# Patient Record
Sex: FEMALE | Race: OTHER | HISPANIC OR LATINO
[De-identification: names, ages, dates, MRNs, and addresses within clinical notes are randomized per-mention and may not be internally consistent; named-entity substitution may affect disease eponyms.]

---

## 2017-01-03 ENCOUNTER — APPOINTMENT (OUTPATIENT)
Dept: ELECTROPHYSIOLOGY | Facility: CLINIC | Age: 57
End: 2017-01-03

## 2017-01-29 ENCOUNTER — RESULT REVIEW (OUTPATIENT)
Age: 57
End: 2017-01-29

## 2017-02-03 ENCOUNTER — APPOINTMENT (OUTPATIENT)
Dept: ELECTROPHYSIOLOGY | Facility: CLINIC | Age: 57
End: 2017-02-03

## 2017-02-27 NOTE — ASU PATIENT PROFILE, ADULT - PMH
Dvt femoral (deep venous thrombosis)  left leg in 2014  Endogenous hyperlipemia    Hypertension    Type 2 diabetes mellitus

## 2017-02-27 NOTE — ASU PATIENT PROFILE, ADULT - PSH
[place] filter in place  left  H/O tubal ligation    Proliferative diabetic retinopathy of left eye  s/p PPV/laser/silicone  5/31/16  Shoulder disorder  left [place] filter in place  left  H/O tubal ligation    History of loop recorder    Proliferative diabetic retinopathy of left eye  s/p PPV/laser/silicone  5/31/16 and 11/29/16 OS  Shoulder disorder  left

## 2017-02-28 ENCOUNTER — OUTPATIENT (OUTPATIENT)
Dept: OUTPATIENT SERVICES | Facility: HOSPITAL | Age: 57
LOS: 1 days | Discharge: HOME | End: 2017-02-28
Payer: MEDICAID

## 2017-02-28 ENCOUNTER — TRANSCRIPTION ENCOUNTER (OUTPATIENT)
Age: 57
End: 2017-02-28

## 2017-02-28 VITALS
HEART RATE: 75 BPM | TEMPERATURE: 98 F | HEIGHT: 60 IN | SYSTOLIC BLOOD PRESSURE: 132 MMHG | WEIGHT: 141.1 LBS | OXYGEN SATURATION: 100 % | RESPIRATION RATE: 17 BRPM | DIASTOLIC BLOOD PRESSURE: 78 MMHG

## 2017-02-28 VITALS
SYSTOLIC BLOOD PRESSURE: 106 MMHG | DIASTOLIC BLOOD PRESSURE: 64 MMHG | RESPIRATION RATE: 17 BRPM | OXYGEN SATURATION: 97 % | HEART RATE: 86 BPM

## 2017-02-28 DIAGNOSIS — Z95.828 PRESENCE OF OTHER VASCULAR IMPLANTS AND GRAFTS: Chronic | ICD-10-CM

## 2017-02-28 DIAGNOSIS — Z98.51 TUBAL LIGATION STATUS: Chronic | ICD-10-CM

## 2017-02-28 DIAGNOSIS — M25.9 JOINT DISORDER, UNSPECIFIED: Chronic | ICD-10-CM

## 2017-02-28 DIAGNOSIS — E11.3519 TYPE 2 DIABETES MELLITUS WITH PROLIFERATIVE DIABETIC RETINOPATHY WITH MACULAR EDEMA, UNSPECIFIED EYE: ICD-10-CM

## 2017-02-28 DIAGNOSIS — Z98.890 OTHER SPECIFIED POSTPROCEDURAL STATES: Chronic | ICD-10-CM

## 2017-02-28 DIAGNOSIS — E11.3592 TYPE 2 DIABETES MELLITUS WITH PROLIFERATIVE DIABETIC RETINOPATHY WITHOUT MACULAR EDEMA, LEFT EYE: Chronic | ICD-10-CM

## 2017-02-28 LAB — HCG UR QL: NEGATIVE — SIGNIFICANT CHANGE UP

## 2017-02-28 PROCEDURE — 81025 URINE PREGNANCY TEST: CPT

## 2017-02-28 PROCEDURE — 67113 REPAIR RETINAL DETACH CPLX: CPT | Mod: RT

## 2017-02-28 PROCEDURE — C1814: CPT

## 2017-02-28 PROCEDURE — C1889: CPT

## 2017-02-28 NOTE — ASU DISCHARGE PLAN (ADULT/PEDIATRIC). - PROCEDURE
right eye retina surgery right eye pars plana vitrectomy, lensectomy, membrane peel, endolaser, silicone oil injection

## 2017-02-28 NOTE — ASU DISCHARGE PLAN (ADULT/PEDIATRIC). - NOTIFY
Bleeding that does not stop/Persistent Nausea and Vomiting/Pain not relieved by Medications/Swelling that continues/Fever greater than 101

## 2017-03-06 ENCOUNTER — APPOINTMENT (OUTPATIENT)
Dept: ELECTROPHYSIOLOGY | Facility: CLINIC | Age: 57
End: 2017-03-06

## 2017-04-07 ENCOUNTER — APPOINTMENT (OUTPATIENT)
Dept: ELECTROPHYSIOLOGY | Facility: CLINIC | Age: 57
End: 2017-04-07

## 2017-05-08 ENCOUNTER — APPOINTMENT (OUTPATIENT)
Dept: ELECTROPHYSIOLOGY | Facility: CLINIC | Age: 57
End: 2017-05-08

## 2017-06-09 ENCOUNTER — APPOINTMENT (OUTPATIENT)
Dept: ELECTROPHYSIOLOGY | Facility: CLINIC | Age: 57
End: 2017-06-09

## 2017-07-10 ENCOUNTER — APPOINTMENT (OUTPATIENT)
Dept: ELECTROPHYSIOLOGY | Facility: CLINIC | Age: 57
End: 2017-07-10

## 2017-08-08 ENCOUNTER — TRANSCRIPTION ENCOUNTER (OUTPATIENT)
Age: 57
End: 2017-08-08

## 2017-08-08 ENCOUNTER — OUTPATIENT (OUTPATIENT)
Dept: OUTPATIENT SERVICES | Facility: HOSPITAL | Age: 57
LOS: 1 days | End: 2017-08-08
Payer: MEDICAID

## 2017-08-08 VITALS
OXYGEN SATURATION: 95 % | DIASTOLIC BLOOD PRESSURE: 77 MMHG | SYSTOLIC BLOOD PRESSURE: 120 MMHG | HEART RATE: 82 BPM | RESPIRATION RATE: 17 BRPM

## 2017-08-08 VITALS
DIASTOLIC BLOOD PRESSURE: 72 MMHG | OXYGEN SATURATION: 97 % | RESPIRATION RATE: 15 BRPM | SYSTOLIC BLOOD PRESSURE: 145 MMHG | WEIGHT: 150.36 LBS | HEART RATE: 76 BPM | TEMPERATURE: 98 F

## 2017-08-08 DIAGNOSIS — Z95.828 PRESENCE OF OTHER VASCULAR IMPLANTS AND GRAFTS: Chronic | ICD-10-CM

## 2017-08-08 DIAGNOSIS — M25.9 JOINT DISORDER, UNSPECIFIED: Chronic | ICD-10-CM

## 2017-08-08 DIAGNOSIS — Z98.51 TUBAL LIGATION STATUS: Chronic | ICD-10-CM

## 2017-08-08 DIAGNOSIS — Z98.890 OTHER SPECIFIED POSTPROCEDURAL STATES: Chronic | ICD-10-CM

## 2017-08-08 DIAGNOSIS — E11.3519 TYPE 2 DIABETES MELLITUS WITH PROLIFERATIVE DIABETIC RETINOPATHY WITH MACULAR EDEMA, UNSPECIFIED EYE: ICD-10-CM

## 2017-08-08 DIAGNOSIS — E11.3592 TYPE 2 DIABETES MELLITUS WITH PROLIFERATIVE DIABETIC RETINOPATHY WITHOUT MACULAR EDEMA, LEFT EYE: Chronic | ICD-10-CM

## 2017-08-08 PROCEDURE — C1814: CPT

## 2017-08-08 PROCEDURE — C1889: CPT

## 2017-08-08 PROCEDURE — 67039 LASER TREATMENT OF RETINA: CPT | Mod: RT

## 2017-08-08 NOTE — ASU PATIENT PROFILE, ADULT - PMH
Dvt femoral (deep venous thrombosis)  left leg in 2014  Endogenous hyperlipemia    Hypertension    Type 2 diabetes mellitus Constipation    DJD (degenerative joint disease)    Dvt femoral (deep venous thrombosis)  left leg in 2014  Endogenous hyperlipemia    Hypertension    Syncope    Type 2 diabetes mellitus    Vitamin D deficiency

## 2017-08-08 NOTE — ASU DISCHARGE PLAN (ADULT/PEDIATRIC). - NOTIFY
Pain not relieved by Medications/Bleeding that does not stop/Inability to Tolerate Liquids or Foods/Fever greater than 101/Persistent Nausea and Vomiting

## 2017-08-08 NOTE — ASU DISCHARGE PLAN (ADULT/PEDIATRIC). - PT EDUC
car gas bubble card  Eye shield with instructions , sunglasses and eye kit given to patient./other (specify)

## 2017-08-08 NOTE — ASU PATIENT PROFILE, ADULT - PSH
[place] filter in place  left  H/O tubal ligation    History of loop recorder    Proliferative diabetic retinopathy of left eye  s/p PPV/laser/silicone  5/31/16 and 11/29/16 OS  Shoulder disorder  left

## 2017-08-11 ENCOUNTER — APPOINTMENT (OUTPATIENT)
Dept: ELECTROPHYSIOLOGY | Facility: CLINIC | Age: 57
End: 2017-08-11
Payer: MEDICAID

## 2017-08-11 PROCEDURE — 93299: CPT

## 2017-08-11 PROCEDURE — 93298 REM INTERROG DEV EVAL SCRMS: CPT

## 2017-09-11 ENCOUNTER — APPOINTMENT (OUTPATIENT)
Dept: ELECTROPHYSIOLOGY | Facility: CLINIC | Age: 57
End: 2017-09-11
Payer: MEDICAID

## 2017-09-11 PROCEDURE — 93298 REM INTERROG DEV EVAL SCRMS: CPT

## 2017-09-11 PROCEDURE — 93299: CPT

## 2017-10-06 ENCOUNTER — APPOINTMENT (OUTPATIENT)
Dept: MAMMOGRAPHY | Facility: CLINIC | Age: 57
End: 2017-10-06
Payer: MEDICAID

## 2017-10-06 ENCOUNTER — OUTPATIENT (OUTPATIENT)
Dept: OUTPATIENT SERVICES | Facility: HOSPITAL | Age: 57
LOS: 1 days | End: 2017-10-06
Payer: MEDICAID

## 2017-10-06 DIAGNOSIS — Z98.51 TUBAL LIGATION STATUS: Chronic | ICD-10-CM

## 2017-10-06 DIAGNOSIS — Z00.8 ENCOUNTER FOR OTHER GENERAL EXAMINATION: ICD-10-CM

## 2017-10-06 DIAGNOSIS — E11.3592 TYPE 2 DIABETES MELLITUS WITH PROLIFERATIVE DIABETIC RETINOPATHY WITHOUT MACULAR EDEMA, LEFT EYE: Chronic | ICD-10-CM

## 2017-10-06 DIAGNOSIS — Z98.890 OTHER SPECIFIED POSTPROCEDURAL STATES: Chronic | ICD-10-CM

## 2017-10-06 DIAGNOSIS — M25.9 JOINT DISORDER, UNSPECIFIED: Chronic | ICD-10-CM

## 2017-10-06 DIAGNOSIS — Z95.828 PRESENCE OF OTHER VASCULAR IMPLANTS AND GRAFTS: Chronic | ICD-10-CM

## 2017-10-06 PROCEDURE — 77063 BREAST TOMOSYNTHESIS BI: CPT | Mod: 26

## 2017-10-06 PROCEDURE — G0202: CPT | Mod: 26

## 2017-10-06 PROCEDURE — 77067 SCR MAMMO BI INCL CAD: CPT

## 2017-10-06 PROCEDURE — 77063 BREAST TOMOSYNTHESIS BI: CPT

## 2017-10-09 ENCOUNTER — APPOINTMENT (OUTPATIENT)
Dept: NEUROLOGY | Facility: CLINIC | Age: 57
End: 2017-10-09

## 2017-10-13 ENCOUNTER — APPOINTMENT (OUTPATIENT)
Dept: ELECTROPHYSIOLOGY | Facility: CLINIC | Age: 57
End: 2017-10-13
Payer: MEDICAID

## 2017-10-13 PROCEDURE — 93298 REM INTERROG DEV EVAL SCRMS: CPT

## 2017-10-13 PROCEDURE — 93299: CPT

## 2017-11-13 ENCOUNTER — APPOINTMENT (OUTPATIENT)
Dept: ELECTROPHYSIOLOGY | Facility: CLINIC | Age: 57
End: 2017-11-13
Payer: MEDICAID

## 2017-11-13 PROCEDURE — 93298 REM INTERROG DEV EVAL SCRMS: CPT

## 2017-11-13 PROCEDURE — 93299: CPT

## 2017-12-15 ENCOUNTER — APPOINTMENT (OUTPATIENT)
Dept: ELECTROPHYSIOLOGY | Facility: CLINIC | Age: 57
End: 2017-12-15
Payer: MEDICAID

## 2017-12-15 PROCEDURE — 93298 REM INTERROG DEV EVAL SCRMS: CPT

## 2017-12-15 PROCEDURE — 93299: CPT

## 2018-01-16 ENCOUNTER — APPOINTMENT (OUTPATIENT)
Dept: ELECTROPHYSIOLOGY | Facility: CLINIC | Age: 58
End: 2018-01-16
Payer: MEDICAID

## 2018-01-16 PROCEDURE — 93298 REM INTERROG DEV EVAL SCRMS: CPT

## 2018-01-16 PROCEDURE — 93299: CPT

## 2018-02-10 ENCOUNTER — EMERGENCY (EMERGENCY)
Facility: HOSPITAL | Age: 58
LOS: 0 days | Discharge: HOME | End: 2018-02-10
Attending: EMERGENCY MEDICINE | Admitting: EMERGENCY MEDICINE
Payer: MEDICAID

## 2018-02-10 VITALS
TEMPERATURE: 98 F | HEART RATE: 78 BPM | RESPIRATION RATE: 18 BRPM | SYSTOLIC BLOOD PRESSURE: 154 MMHG | HEIGHT: 60 IN | DIASTOLIC BLOOD PRESSURE: 89 MMHG | OXYGEN SATURATION: 100 % | WEIGHT: 154.1 LBS

## 2018-02-10 VITALS
DIASTOLIC BLOOD PRESSURE: 76 MMHG | OXYGEN SATURATION: 100 % | HEART RATE: 80 BPM | RESPIRATION RATE: 16 BRPM | SYSTOLIC BLOOD PRESSURE: 141 MMHG

## 2018-02-10 DIAGNOSIS — Z91.018 ALLERGY TO OTHER FOODS: ICD-10-CM

## 2018-02-10 DIAGNOSIS — Z98.890 OTHER SPECIFIED POSTPROCEDURAL STATES: Chronic | ICD-10-CM

## 2018-02-10 DIAGNOSIS — E55.9 VITAMIN D DEFICIENCY, UNSPECIFIED: ICD-10-CM

## 2018-02-10 DIAGNOSIS — Z98.51 TUBAL LIGATION STATUS: Chronic | ICD-10-CM

## 2018-02-10 DIAGNOSIS — E11.3592 TYPE 2 DIABETES MELLITUS WITH PROLIFERATIVE DIABETIC RETINOPATHY WITHOUT MACULAR EDEMA, LEFT EYE: Chronic | ICD-10-CM

## 2018-02-10 DIAGNOSIS — R42 DIZZINESS AND GIDDINESS: ICD-10-CM

## 2018-02-10 DIAGNOSIS — Z88.5 ALLERGY STATUS TO NARCOTIC AGENT: ICD-10-CM

## 2018-02-10 DIAGNOSIS — E78.2 MIXED HYPERLIPIDEMIA: ICD-10-CM

## 2018-02-10 DIAGNOSIS — Z88.0 ALLERGY STATUS TO PENICILLIN: ICD-10-CM

## 2018-02-10 DIAGNOSIS — E11.9 TYPE 2 DIABETES MELLITUS WITHOUT COMPLICATIONS: ICD-10-CM

## 2018-02-10 DIAGNOSIS — M25.9 JOINT DISORDER, UNSPECIFIED: Chronic | ICD-10-CM

## 2018-02-10 DIAGNOSIS — I10 ESSENTIAL (PRIMARY) HYPERTENSION: ICD-10-CM

## 2018-02-10 DIAGNOSIS — M19.90 UNSPECIFIED OSTEOARTHRITIS, UNSPECIFIED SITE: ICD-10-CM

## 2018-02-10 DIAGNOSIS — Z95.818 PRESENCE OF OTHER CARDIAC IMPLANTS AND GRAFTS: ICD-10-CM

## 2018-02-10 DIAGNOSIS — Z86.718 PERSONAL HISTORY OF OTHER VENOUS THROMBOSIS AND EMBOLISM: ICD-10-CM

## 2018-02-10 DIAGNOSIS — Z98.51 TUBAL LIGATION STATUS: ICD-10-CM

## 2018-02-10 DIAGNOSIS — Z95.828 PRESENCE OF OTHER VASCULAR IMPLANTS AND GRAFTS: Chronic | ICD-10-CM

## 2018-02-10 LAB
ANION GAP SERPL CALC-SCNC: 7 MMOL/L — SIGNIFICANT CHANGE UP (ref 5–17)
BASOPHILS # BLD AUTO: 0.1 K/UL — SIGNIFICANT CHANGE UP (ref 0–0.2)
BASOPHILS NFR BLD AUTO: 0.6 % — SIGNIFICANT CHANGE UP (ref 0–2)
BUN SERPL-MCNC: 14 MG/DL — SIGNIFICANT CHANGE UP (ref 7–23)
CALCIUM SERPL-MCNC: 9.2 MG/DL — SIGNIFICANT CHANGE UP (ref 8.5–10.1)
CHLORIDE SERPL-SCNC: 107 MMOL/L — SIGNIFICANT CHANGE UP (ref 96–108)
CO2 SERPL-SCNC: 29 MMOL/L — SIGNIFICANT CHANGE UP (ref 22–31)
CREAT SERPL-MCNC: 0.72 MG/DL — SIGNIFICANT CHANGE UP (ref 0.5–1.3)
EOSINOPHIL # BLD AUTO: 0.5 K/UL — SIGNIFICANT CHANGE UP (ref 0–0.5)
EOSINOPHIL NFR BLD AUTO: 4.7 % — SIGNIFICANT CHANGE UP (ref 0–6)
GLUCOSE SERPL-MCNC: 90 MG/DL — SIGNIFICANT CHANGE UP (ref 70–99)
HCT VFR BLD CALC: 39.2 % — SIGNIFICANT CHANGE UP (ref 34.5–45)
HGB BLD-MCNC: 13.4 G/DL — SIGNIFICANT CHANGE UP (ref 11.5–15.5)
LYMPHOCYTES # BLD AUTO: 4.2 K/UL — HIGH (ref 1–3.3)
LYMPHOCYTES # BLD AUTO: 40.4 % — SIGNIFICANT CHANGE UP (ref 13–44)
MCHC RBC-ENTMCNC: 30.2 PG — SIGNIFICANT CHANGE UP (ref 27–34)
MCHC RBC-ENTMCNC: 34.2 GM/DL — SIGNIFICANT CHANGE UP (ref 32–36)
MCV RBC AUTO: 88 FL — SIGNIFICANT CHANGE UP (ref 80–100)
MONOCYTES # BLD AUTO: 0.6 K/UL — SIGNIFICANT CHANGE UP (ref 0–0.9)
MONOCYTES NFR BLD AUTO: 5.8 % — SIGNIFICANT CHANGE UP (ref 2–14)
NEUTROPHILS # BLD AUTO: 5 K/UL — SIGNIFICANT CHANGE UP (ref 1.8–7.4)
NEUTROPHILS NFR BLD AUTO: 48.5 % — SIGNIFICANT CHANGE UP (ref 43–77)
PLATELET # BLD AUTO: 302 K/UL — SIGNIFICANT CHANGE UP (ref 150–400)
POTASSIUM SERPL-MCNC: 4.1 MMOL/L — SIGNIFICANT CHANGE UP (ref 3.5–5.3)
POTASSIUM SERPL-SCNC: 4.1 MMOL/L — SIGNIFICANT CHANGE UP (ref 3.5–5.3)
RBC # BLD: 4.46 M/UL — SIGNIFICANT CHANGE UP (ref 3.8–5.2)
RBC # FLD: 12 % — SIGNIFICANT CHANGE UP (ref 10.3–14.5)
SODIUM SERPL-SCNC: 143 MMOL/L — SIGNIFICANT CHANGE UP (ref 135–145)
WBC # BLD: 10.4 K/UL — SIGNIFICANT CHANGE UP (ref 3.8–10.5)
WBC # FLD AUTO: 10.4 K/UL — SIGNIFICANT CHANGE UP (ref 3.8–10.5)

## 2018-02-10 PROCEDURE — 99285 EMERGENCY DEPT VISIT HI MDM: CPT | Mod: 25

## 2018-02-10 PROCEDURE — 93010 ELECTROCARDIOGRAM REPORT: CPT

## 2018-02-10 RX ORDER — SODIUM CHLORIDE 9 MG/ML
100 INJECTION INTRAMUSCULAR; INTRAVENOUS; SUBCUTANEOUS ONCE
Qty: 0 | Refills: 0 | Status: DISCONTINUED | OUTPATIENT
Start: 2018-02-10 | End: 2018-02-10

## 2018-02-10 RX ORDER — MECLIZINE HCL 12.5 MG
25 TABLET ORAL ONCE
Qty: 0 | Refills: 0 | Status: COMPLETED | OUTPATIENT
Start: 2018-02-10 | End: 2018-02-10

## 2018-02-10 RX ORDER — DIPHENHYDRAMINE HCL 50 MG
50 CAPSULE ORAL ONCE
Qty: 0 | Refills: 0 | Status: COMPLETED | OUTPATIENT
Start: 2018-02-10 | End: 2018-02-10

## 2018-02-10 RX ADMIN — Medication 50 MILLIGRAM(S): at 01:30

## 2018-02-10 RX ADMIN — Medication 1 MILLIGRAM(S): at 01:41

## 2018-02-10 RX ADMIN — Medication 25 MILLIGRAM(S): at 01:41

## 2018-02-10 NOTE — ED PROVIDER NOTE - PSH
[place] filter in place  left  H/O tubal ligation    History of loop recorder    Proliferative diabetic retinopathy of left eye  s/p PPV/laser/silicone  5/31/16 and 11/29/16 OS  Shoulder disorder  left

## 2018-02-10 NOTE — ED PROVIDER NOTE - PMH
Constipation    DJD (degenerative joint disease)    Dvt femoral (deep venous thrombosis)  left leg in 2014  Endogenous hyperlipemia    Hypertension    Syncope    Type 2 diabetes mellitus    Vitamin D deficiency

## 2018-02-10 NOTE — ED ADULT NURSE REASSESSMENT NOTE - NS ED NURSE REASSESS COMMENT FT1
Pt began screaming and c/o increased dizziness s/p receiving benadryl, Dr. Nassar made aware, pt given Ativan as ordered.  Will continue to monitor.

## 2018-02-10 NOTE — ED PROVIDER NOTE - PHYSICAL EXAMINATION
neuro: CN II - XII intact, EOMI, PERRL, no papilledema, 5/5 muscle strength x 4 extremities, no sensory deficits, 2+ dtr globally, negative babinski, no ataxic gait, normal JW and FNT, normal romberg

## 2018-02-10 NOTE — ED PROVIDER NOTE - MEDICAL DECISION MAKING DETAILS
feeling much better ambulating in nad no new neuro deficits isloated vertigo do not suspect post cva clincally return to ed for intractable HA new onset motor or senosry deficits. or any oveerall worsening
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
## 2018-02-10 NOTE — ED ADULT NURSE NOTE - OBJECTIVE STATEMENT
Pt BIBA - as per family pt has hx of anxiety and there was an 'incident' at the house this evening that triggered possible anxiety attack.  Pt states she takes medication for anxiety but is unsure what it is. She is also c/o headache to left side.  Pt appears anxious and is tearful. Pt also states that at times she experiences dizziness when changing position in bed.

## 2018-02-16 ENCOUNTER — APPOINTMENT (OUTPATIENT)
Dept: ELECTROPHYSIOLOGY | Facility: CLINIC | Age: 58
End: 2018-02-16
Payer: MEDICAID

## 2018-02-16 PROCEDURE — 93298 REM INTERROG DEV EVAL SCRMS: CPT

## 2018-02-16 PROCEDURE — 93299: CPT

## 2018-03-12 NOTE — ASU PATIENT PROFILE, ADULT - PMH
Constipation    DJD (degenerative joint disease)    Dvt femoral (deep venous thrombosis)  left leg in 2014  Endogenous hyperlipemia    Hypertension    Syncope    Type 2 diabetes mellitus    Vitamin D deficiency Constipation    DJD (degenerative joint disease)    Dvt femoral (deep venous thrombosis)  left leg in 2014  HLD (hyperlipidemia)    Hypertension    Syncope    Type 2 diabetes mellitus    Vitamin D deficiency

## 2018-03-12 NOTE — ASU PATIENT PROFILE, ADULT - LANGUAGE ASSISTANCE NEEDED
Patient also gave permission, through [employer] [place]  to have her daughter translate for her as needed today./Yes-Patient/Caregiver accepts free interpretation services...

## 2018-03-13 ENCOUNTER — OUTPATIENT (OUTPATIENT)
Dept: OUTPATIENT SERVICES | Facility: HOSPITAL | Age: 58
LOS: 1 days | End: 2018-03-13
Payer: MEDICAID

## 2018-03-13 VITALS
RESPIRATION RATE: 18 BRPM | OXYGEN SATURATION: 99 % | DIASTOLIC BLOOD PRESSURE: 58 MMHG | HEART RATE: 90 BPM | SYSTOLIC BLOOD PRESSURE: 116 MMHG

## 2018-03-13 VITALS
HEART RATE: 80 BPM | RESPIRATION RATE: 14 BRPM | DIASTOLIC BLOOD PRESSURE: 71 MMHG | WEIGHT: 153 LBS | OXYGEN SATURATION: 95 % | HEIGHT: 61 IN | SYSTOLIC BLOOD PRESSURE: 137 MMHG | TEMPERATURE: 99 F

## 2018-03-13 DIAGNOSIS — E11.3592 TYPE 2 DIABETES MELLITUS WITH PROLIFERATIVE DIABETIC RETINOPATHY WITHOUT MACULAR EDEMA, LEFT EYE: Chronic | ICD-10-CM

## 2018-03-13 DIAGNOSIS — Z98.890 OTHER SPECIFIED POSTPROCEDURAL STATES: Chronic | ICD-10-CM

## 2018-03-13 DIAGNOSIS — Z95.828 PRESENCE OF OTHER VASCULAR IMPLANTS AND GRAFTS: Chronic | ICD-10-CM

## 2018-03-13 DIAGNOSIS — Z98.51 TUBAL LIGATION STATUS: Chronic | ICD-10-CM

## 2018-03-13 DIAGNOSIS — E11.3511 TYPE 2 DIABETES MELLITUS WITH PROLIFERATIVE DIABETIC RETINOPATHY WITH MACULAR EDEMA, RIGHT EYE: ICD-10-CM

## 2018-03-13 DIAGNOSIS — M25.9 JOINT DISORDER, UNSPECIFIED: Chronic | ICD-10-CM

## 2018-03-13 LAB — GLUCOSE BLDC GLUCOMTR-MCNC: 199 MG/DL — HIGH (ref 70–99)

## 2018-03-13 PROCEDURE — 66985 INSERT LENS PROSTHESIS: CPT | Mod: RT

## 2018-03-13 PROCEDURE — 82962 GLUCOSE BLOOD TEST: CPT

## 2018-03-13 PROCEDURE — C1762: CPT

## 2018-03-13 PROCEDURE — C1783: CPT

## 2018-03-13 PROCEDURE — 66180 AQUEOUS SHUNT EYE W/GRAFT: CPT | Mod: RT

## 2018-03-13 PROCEDURE — C1780: CPT

## 2018-03-13 NOTE — ASU DISCHARGE PLAN (ADULT/PEDIATRIC). - PROCEDURE
Right eye surgery secondary intraocular lens implant right eye, ahmed valve, implant with pericardial patch graft

## 2018-03-13 NOTE — ASU DISCHARGE PLAN (ADULT/PEDIATRIC). - SPECIAL INSTRUCTIONS
Drops:  Prednisolone actetate 1% every 2 hours right eye  Besivance three times day right eye  Ilevro once per day right eye

## 2018-03-13 NOTE — ASU DISCHARGE PLAN (ADULT/PEDIATRIC). - NOTIFY
Pain not relieved by Medications Fever greater than 101/Pain not relieved by Medications/Swelling that continues/Persistent Nausea and Vomiting/Bleeding that does not stop

## 2018-03-14 ENCOUNTER — TRANSCRIPTION ENCOUNTER (OUTPATIENT)
Age: 58
End: 2018-03-14

## 2018-03-19 ENCOUNTER — APPOINTMENT (OUTPATIENT)
Dept: ELECTROPHYSIOLOGY | Facility: CLINIC | Age: 58
End: 2018-03-19
Payer: MEDICAID

## 2018-03-19 PROCEDURE — 93298 REM INTERROG DEV EVAL SCRMS: CPT

## 2018-03-19 PROCEDURE — 93299: CPT

## 2018-04-20 ENCOUNTER — APPOINTMENT (OUTPATIENT)
Dept: ELECTROPHYSIOLOGY | Facility: CLINIC | Age: 58
End: 2018-04-20
Payer: MEDICAID

## 2018-04-20 PROCEDURE — 93299: CPT

## 2018-04-20 PROCEDURE — 93298 REM INTERROG DEV EVAL SCRMS: CPT

## 2018-05-21 ENCOUNTER — APPOINTMENT (OUTPATIENT)
Dept: ELECTROPHYSIOLOGY | Facility: CLINIC | Age: 58
End: 2018-05-21
Payer: MEDICAID

## 2018-05-21 PROCEDURE — 93299: CPT

## 2018-05-21 PROCEDURE — 93298 REM INTERROG DEV EVAL SCRMS: CPT

## 2018-06-22 ENCOUNTER — APPOINTMENT (OUTPATIENT)
Dept: ELECTROPHYSIOLOGY | Facility: CLINIC | Age: 58
End: 2018-06-22
Payer: MEDICAID

## 2018-06-22 PROCEDURE — 93298 REM INTERROG DEV EVAL SCRMS: CPT

## 2018-06-22 PROCEDURE — 93299: CPT

## 2018-07-22 ENCOUNTER — INPATIENT (INPATIENT)
Facility: HOSPITAL | Age: 58
LOS: 3 days | Discharge: HOME | End: 2018-07-26
Attending: FAMILY MEDICINE | Admitting: FAMILY MEDICINE
Payer: MEDICAID

## 2018-07-22 VITALS
DIASTOLIC BLOOD PRESSURE: 89 MMHG | SYSTOLIC BLOOD PRESSURE: 153 MMHG | HEART RATE: 89 BPM | OXYGEN SATURATION: 98 % | RESPIRATION RATE: 16 BRPM | TEMPERATURE: 98 F

## 2018-07-22 DIAGNOSIS — Z95.828 PRESENCE OF OTHER VASCULAR IMPLANTS AND GRAFTS: Chronic | ICD-10-CM

## 2018-07-22 DIAGNOSIS — Z98.51 TUBAL LIGATION STATUS: Chronic | ICD-10-CM

## 2018-07-22 DIAGNOSIS — Z98.890 OTHER SPECIFIED POSTPROCEDURAL STATES: Chronic | ICD-10-CM

## 2018-07-22 DIAGNOSIS — N20.1 CALCULUS OF URETER: ICD-10-CM

## 2018-07-22 DIAGNOSIS — M25.9 JOINT DISORDER, UNSPECIFIED: Chronic | ICD-10-CM

## 2018-07-22 DIAGNOSIS — E11.3592 TYPE 2 DIABETES MELLITUS WITH PROLIFERATIVE DIABETIC RETINOPATHY WITHOUT MACULAR EDEMA, LEFT EYE: Chronic | ICD-10-CM

## 2018-07-22 PROBLEM — R55 SYNCOPE AND COLLAPSE: Chronic | Status: ACTIVE | Noted: 2017-08-08

## 2018-07-22 PROBLEM — E78.5 HYPERLIPIDEMIA, UNSPECIFIED: Chronic | Status: ACTIVE | Noted: 2018-03-13

## 2018-07-22 PROBLEM — E55.9 VITAMIN D DEFICIENCY, UNSPECIFIED: Chronic | Status: ACTIVE | Noted: 2017-08-08

## 2018-07-22 PROBLEM — M19.90 UNSPECIFIED OSTEOARTHRITIS, UNSPECIFIED SITE: Chronic | Status: ACTIVE | Noted: 2017-08-08

## 2018-07-22 PROBLEM — K59.00 CONSTIPATION, UNSPECIFIED: Chronic | Status: ACTIVE | Noted: 2017-08-08

## 2018-07-22 LAB
ALBUMIN SERPL ELPH-MCNC: 3.6 G/DL — SIGNIFICANT CHANGE UP (ref 3.3–5)
ALP SERPL-CCNC: 128 U/L — HIGH (ref 40–120)
ALT FLD-CCNC: 33 U/L — SIGNIFICANT CHANGE UP (ref 12–78)
ANION GAP SERPL CALC-SCNC: 6 MMOL/L — SIGNIFICANT CHANGE UP (ref 5–17)
APTT BLD: 27.1 SEC — LOW (ref 27.5–37.4)
AST SERPL-CCNC: 40 U/L — HIGH (ref 15–37)
BASOPHILS # BLD AUTO: 0.06 K/UL — SIGNIFICANT CHANGE UP (ref 0–0.2)
BASOPHILS NFR BLD AUTO: 0.4 % — SIGNIFICANT CHANGE UP (ref 0–2)
BILIRUB SERPL-MCNC: 0.5 MG/DL — SIGNIFICANT CHANGE UP (ref 0.2–1.2)
BUN SERPL-MCNC: 12 MG/DL — SIGNIFICANT CHANGE UP (ref 7–23)
CALCIUM SERPL-MCNC: 9 MG/DL — SIGNIFICANT CHANGE UP (ref 8.5–10.1)
CHLORIDE SERPL-SCNC: 103 MMOL/L — SIGNIFICANT CHANGE UP (ref 96–108)
CO2 SERPL-SCNC: 27 MMOL/L — SIGNIFICANT CHANGE UP (ref 22–31)
CREAT SERPL-MCNC: 0.84 MG/DL — SIGNIFICANT CHANGE UP (ref 0.5–1.3)
EOSINOPHIL # BLD AUTO: 0.41 K/UL — SIGNIFICANT CHANGE UP (ref 0–0.5)
EOSINOPHIL NFR BLD AUTO: 2.6 % — SIGNIFICANT CHANGE UP (ref 0–6)
GLUCOSE BLDC GLUCOMTR-MCNC: 318 MG/DL — HIGH (ref 70–99)
GLUCOSE SERPL-MCNC: 217 MG/DL — HIGH (ref 70–99)
HCT VFR BLD CALC: 40 % — SIGNIFICANT CHANGE UP (ref 34.5–45)
HGB BLD-MCNC: 13.7 G/DL — SIGNIFICANT CHANGE UP (ref 11.5–15.5)
IMM GRANULOCYTES NFR BLD AUTO: 0.5 % — SIGNIFICANT CHANGE UP (ref 0–1.5)
INR BLD: 0.95 RATIO — SIGNIFICANT CHANGE UP (ref 0.88–1.16)
LIDOCAIN IGE QN: 59 U/L — LOW (ref 73–393)
LYMPHOCYTES # BLD AUTO: 25.1 % — SIGNIFICANT CHANGE UP (ref 13–44)
LYMPHOCYTES # BLD AUTO: 3.96 K/UL — HIGH (ref 1–3.3)
MCHC RBC-ENTMCNC: 30.1 PG — SIGNIFICANT CHANGE UP (ref 27–34)
MCHC RBC-ENTMCNC: 34.3 GM/DL — SIGNIFICANT CHANGE UP (ref 32–36)
MCV RBC AUTO: 87.9 FL — SIGNIFICANT CHANGE UP (ref 80–100)
MONOCYTES # BLD AUTO: 0.72 K/UL — SIGNIFICANT CHANGE UP (ref 0–0.9)
MONOCYTES NFR BLD AUTO: 4.6 % — SIGNIFICANT CHANGE UP (ref 2–14)
NEUTROPHILS # BLD AUTO: 10.54 K/UL — HIGH (ref 1.8–7.4)
NEUTROPHILS NFR BLD AUTO: 66.8 % — SIGNIFICANT CHANGE UP (ref 43–77)
NRBC # BLD: 0 /100 WBCS — SIGNIFICANT CHANGE UP (ref 0–0)
PLATELET # BLD AUTO: 316 K/UL — SIGNIFICANT CHANGE UP (ref 150–400)
POTASSIUM SERPL-MCNC: 5.6 MMOL/L — HIGH (ref 3.5–5.3)
POTASSIUM SERPL-SCNC: 5.6 MMOL/L — HIGH (ref 3.5–5.3)
PROT SERPL-MCNC: 7.5 GM/DL — SIGNIFICANT CHANGE UP (ref 6–8.3)
PROTHROM AB SERPL-ACNC: 10.2 SEC — SIGNIFICANT CHANGE UP (ref 9.8–12.7)
RBC # BLD: 4.55 M/UL — SIGNIFICANT CHANGE UP (ref 3.8–5.2)
RBC # FLD: 12.5 % — SIGNIFICANT CHANGE UP (ref 10.3–14.5)
SODIUM SERPL-SCNC: 136 MMOL/L — SIGNIFICANT CHANGE UP (ref 135–145)
WBC # BLD: 15.77 K/UL — HIGH (ref 3.8–10.5)
WBC # FLD AUTO: 15.77 K/UL — HIGH (ref 3.8–10.5)

## 2018-07-22 PROCEDURE — 52332 CYSTOSCOPY AND TREATMENT: CPT | Mod: RT

## 2018-07-22 PROCEDURE — 93010 ELECTROCARDIOGRAM REPORT: CPT

## 2018-07-22 PROCEDURE — 99253 IP/OBS CNSLTJ NEW/EST LOW 45: CPT | Mod: 25

## 2018-07-22 PROCEDURE — 99285 EMERGENCY DEPT VISIT HI MDM: CPT

## 2018-07-22 PROCEDURE — 74177 CT ABD & PELVIS W/CONTRAST: CPT | Mod: 26

## 2018-07-22 RX ORDER — GABAPENTIN 400 MG/1
300 CAPSULE ORAL THREE TIMES A DAY
Qty: 0 | Refills: 0 | Status: DISCONTINUED | OUTPATIENT
Start: 2018-07-22 | End: 2018-07-22

## 2018-07-22 RX ORDER — HYDROMORPHONE HYDROCHLORIDE 2 MG/ML
0.5 INJECTION INTRAMUSCULAR; INTRAVENOUS; SUBCUTANEOUS
Qty: 0 | Refills: 0 | Status: DISCONTINUED | OUTPATIENT
Start: 2018-07-22 | End: 2018-07-22

## 2018-07-22 RX ORDER — HYDROMORPHONE HYDROCHLORIDE 2 MG/ML
1 INJECTION INTRAMUSCULAR; INTRAVENOUS; SUBCUTANEOUS ONCE
Qty: 0 | Refills: 0 | Status: DISCONTINUED | OUTPATIENT
Start: 2018-07-22 | End: 2018-07-22

## 2018-07-22 RX ORDER — INSULIN LISPRO 100/ML
VIAL (ML) SUBCUTANEOUS AT BEDTIME
Qty: 0 | Refills: 0 | Status: DISCONTINUED | OUTPATIENT
Start: 2018-07-22 | End: 2018-07-26

## 2018-07-22 RX ORDER — ACETAMINOPHEN 500 MG
1000 TABLET ORAL ONCE
Qty: 0 | Refills: 0 | Status: COMPLETED | OUTPATIENT
Start: 2018-07-22 | End: 2018-07-22

## 2018-07-22 RX ORDER — CEFTRIAXONE 500 MG/1
1000 INJECTION, POWDER, FOR SOLUTION INTRAMUSCULAR; INTRAVENOUS ONCE
Qty: 0 | Refills: 0 | Status: DISCONTINUED | OUTPATIENT
Start: 2018-07-22 | End: 2018-07-22

## 2018-07-22 RX ORDER — DEXTROSE 50 % IN WATER 50 %
15 SYRINGE (ML) INTRAVENOUS ONCE
Qty: 0 | Refills: 0 | Status: DISCONTINUED | OUTPATIENT
Start: 2018-07-22 | End: 2018-07-22

## 2018-07-22 RX ORDER — GLUCAGON INJECTION, SOLUTION 0.5 MG/.1ML
1 INJECTION, SOLUTION SUBCUTANEOUS ONCE
Qty: 0 | Refills: 0 | Status: DISCONTINUED | OUTPATIENT
Start: 2018-07-22 | End: 2018-07-26

## 2018-07-22 RX ORDER — DEXTROSE 50 % IN WATER 50 %
12.5 SYRINGE (ML) INTRAVENOUS ONCE
Qty: 0 | Refills: 0 | Status: DISCONTINUED | OUTPATIENT
Start: 2018-07-22 | End: 2018-07-22

## 2018-07-22 RX ORDER — ONDANSETRON 8 MG/1
4 TABLET, FILM COATED ORAL EVERY 6 HOURS
Qty: 0 | Refills: 0 | Status: DISCONTINUED | OUTPATIENT
Start: 2018-07-22 | End: 2018-07-26

## 2018-07-22 RX ORDER — ATORVASTATIN CALCIUM 80 MG/1
20 TABLET, FILM COATED ORAL AT BEDTIME
Qty: 0 | Refills: 0 | Status: DISCONTINUED | OUTPATIENT
Start: 2018-07-22 | End: 2018-07-22

## 2018-07-22 RX ORDER — INSULIN LISPRO 100/ML
VIAL (ML) SUBCUTANEOUS AT BEDTIME
Qty: 0 | Refills: 0 | Status: DISCONTINUED | OUTPATIENT
Start: 2018-07-22 | End: 2018-07-22

## 2018-07-22 RX ORDER — TAMSULOSIN HYDROCHLORIDE 0.4 MG/1
0.4 CAPSULE ORAL AT BEDTIME
Qty: 0 | Refills: 0 | Status: DISCONTINUED | OUTPATIENT
Start: 2018-07-22 | End: 2018-07-26

## 2018-07-22 RX ORDER — TAMSULOSIN HYDROCHLORIDE 0.4 MG/1
0.4 CAPSULE ORAL AT BEDTIME
Qty: 0 | Refills: 0 | Status: DISCONTINUED | OUTPATIENT
Start: 2018-07-22 | End: 2018-07-22

## 2018-07-22 RX ORDER — LISINOPRIL 2.5 MG/1
10 TABLET ORAL DAILY
Qty: 0 | Refills: 0 | Status: DISCONTINUED | OUTPATIENT
Start: 2018-07-22 | End: 2018-07-22

## 2018-07-22 RX ORDER — SODIUM CHLORIDE 9 MG/ML
1000 INJECTION, SOLUTION INTRAVENOUS
Qty: 0 | Refills: 0 | Status: DISCONTINUED | OUTPATIENT
Start: 2018-07-22 | End: 2018-07-26

## 2018-07-22 RX ORDER — OFLOXACIN 0.3 %
1 DROPS OPHTHALMIC (EYE) THREE TIMES A DAY
Qty: 0 | Refills: 0 | Status: DISCONTINUED | OUTPATIENT
Start: 2018-07-22 | End: 2018-07-26

## 2018-07-22 RX ORDER — DEXTROSE 50 % IN WATER 50 %
25 SYRINGE (ML) INTRAVENOUS ONCE
Qty: 0 | Refills: 0 | Status: DISCONTINUED | OUTPATIENT
Start: 2018-07-22 | End: 2018-07-26

## 2018-07-22 RX ORDER — GABAPENTIN 400 MG/1
300 CAPSULE ORAL THREE TIMES A DAY
Qty: 0 | Refills: 0 | Status: DISCONTINUED | OUTPATIENT
Start: 2018-07-22 | End: 2018-07-26

## 2018-07-22 RX ORDER — ONDANSETRON 8 MG/1
4 TABLET, FILM COATED ORAL ONCE
Qty: 0 | Refills: 0 | Status: DISCONTINUED | OUTPATIENT
Start: 2018-07-22 | End: 2018-07-22

## 2018-07-22 RX ORDER — INSULIN LISPRO 100/ML
VIAL (ML) SUBCUTANEOUS
Qty: 0 | Refills: 0 | Status: DISCONTINUED | OUTPATIENT
Start: 2018-07-22 | End: 2018-07-26

## 2018-07-22 RX ORDER — DOCUSATE SODIUM 100 MG
100 CAPSULE ORAL
Qty: 0 | Refills: 0 | Status: DISCONTINUED | OUTPATIENT
Start: 2018-07-22 | End: 2018-07-26

## 2018-07-22 RX ORDER — CEFTRIAXONE 500 MG/1
INJECTION, POWDER, FOR SOLUTION INTRAMUSCULAR; INTRAVENOUS
Qty: 0 | Refills: 0 | Status: DISCONTINUED | OUTPATIENT
Start: 2018-07-22 | End: 2018-07-22

## 2018-07-22 RX ORDER — PANTOPRAZOLE SODIUM 20 MG/1
40 TABLET, DELAYED RELEASE ORAL
Qty: 0 | Refills: 0 | Status: DISCONTINUED | OUTPATIENT
Start: 2018-07-22 | End: 2018-07-22

## 2018-07-22 RX ORDER — MORPHINE SULFATE 50 MG/1
4 CAPSULE, EXTENDED RELEASE ORAL ONCE
Qty: 0 | Refills: 0 | Status: DISCONTINUED | OUTPATIENT
Start: 2018-07-22 | End: 2018-07-22

## 2018-07-22 RX ORDER — SODIUM CHLORIDE 9 MG/ML
3 INJECTION INTRAMUSCULAR; INTRAVENOUS; SUBCUTANEOUS ONCE
Qty: 0 | Refills: 0 | Status: COMPLETED | OUTPATIENT
Start: 2018-07-22 | End: 2018-07-22

## 2018-07-22 RX ORDER — PREDNISOLONE SODIUM PHOSPHATE 1 %
1 DROPS OPHTHALMIC (EYE)
Qty: 0 | Refills: 0 | Status: DISCONTINUED | OUTPATIENT
Start: 2018-07-22 | End: 2018-07-26

## 2018-07-22 RX ORDER — DEXTROSE 50 % IN WATER 50 %
25 SYRINGE (ML) INTRAVENOUS ONCE
Qty: 0 | Refills: 0 | Status: DISCONTINUED | OUTPATIENT
Start: 2018-07-22 | End: 2018-07-22

## 2018-07-22 RX ORDER — INSULIN GLARGINE 100 [IU]/ML
15 INJECTION, SOLUTION SUBCUTANEOUS AT BEDTIME
Qty: 0 | Refills: 0 | Status: DISCONTINUED | OUTPATIENT
Start: 2018-07-22 | End: 2018-07-22

## 2018-07-22 RX ORDER — PANTOPRAZOLE SODIUM 20 MG/1
40 TABLET, DELAYED RELEASE ORAL
Qty: 0 | Refills: 0 | Status: DISCONTINUED | OUTPATIENT
Start: 2018-07-22 | End: 2018-07-26

## 2018-07-22 RX ORDER — SODIUM CHLORIDE 9 MG/ML
1000 INJECTION, SOLUTION INTRAVENOUS
Qty: 0 | Refills: 0 | Status: DISCONTINUED | OUTPATIENT
Start: 2018-07-22 | End: 2018-07-22

## 2018-07-22 RX ORDER — DORZOLAMIDE HYDROCHLORIDE TIMOLOL MALEATE 20; 5 MG/ML; MG/ML
1 SOLUTION/ DROPS OPHTHALMIC
Qty: 0 | Refills: 0 | Status: DISCONTINUED | OUTPATIENT
Start: 2018-07-22 | End: 2018-07-26

## 2018-07-22 RX ORDER — INSULIN GLARGINE 100 [IU]/ML
20 INJECTION, SOLUTION SUBCUTANEOUS EVERY MORNING
Qty: 0 | Refills: 0 | Status: CANCELLED | OUTPATIENT
Start: 2018-07-23 | End: 2018-07-22

## 2018-07-22 RX ORDER — SODIUM CHLORIDE 9 MG/ML
1000 INJECTION INTRAMUSCULAR; INTRAVENOUS; SUBCUTANEOUS
Qty: 0 | Refills: 0 | Status: DISCONTINUED | OUTPATIENT
Start: 2018-07-22 | End: 2018-07-22

## 2018-07-22 RX ORDER — INSULIN GLARGINE 100 [IU]/ML
20 INJECTION, SOLUTION SUBCUTANEOUS EVERY MORNING
Qty: 0 | Refills: 0 | Status: DISCONTINUED | OUTPATIENT
Start: 2018-07-23 | End: 2018-07-24

## 2018-07-22 RX ORDER — DORZOLAMIDE HYDROCHLORIDE TIMOLOL MALEATE 20; 5 MG/ML; MG/ML
1 SOLUTION/ DROPS OPHTHALMIC
Qty: 0 | Refills: 0 | Status: DISCONTINUED | OUTPATIENT
Start: 2018-07-22 | End: 2018-07-22

## 2018-07-22 RX ORDER — DEXTROSE 50 % IN WATER 50 %
12.5 SYRINGE (ML) INTRAVENOUS ONCE
Qty: 0 | Refills: 0 | Status: DISCONTINUED | OUTPATIENT
Start: 2018-07-22 | End: 2018-07-26

## 2018-07-22 RX ORDER — CYCLOBENZAPRINE HYDROCHLORIDE 10 MG/1
5 TABLET, FILM COATED ORAL DAILY
Qty: 0 | Refills: 0 | Status: DISCONTINUED | OUTPATIENT
Start: 2018-07-22 | End: 2018-07-22

## 2018-07-22 RX ORDER — DOCUSATE SODIUM 100 MG
100 CAPSULE ORAL
Qty: 0 | Refills: 0 | Status: DISCONTINUED | OUTPATIENT
Start: 2018-07-22 | End: 2018-07-22

## 2018-07-22 RX ORDER — KETOROLAC TROMETHAMINE 0.5 %
1 DROPS OPHTHALMIC (EYE) DAILY
Qty: 0 | Refills: 0 | Status: DISCONTINUED | OUTPATIENT
Start: 2018-07-22 | End: 2018-07-22

## 2018-07-22 RX ORDER — MEPERIDINE HYDROCHLORIDE 50 MG/ML
12.5 INJECTION INTRAMUSCULAR; INTRAVENOUS; SUBCUTANEOUS
Qty: 0 | Refills: 0 | Status: DISCONTINUED | OUTPATIENT
Start: 2018-07-22 | End: 2018-07-22

## 2018-07-22 RX ORDER — INSULIN LISPRO 100/ML
4 VIAL (ML) SUBCUTANEOUS
Qty: 0 | Refills: 0 | Status: DISCONTINUED | OUTPATIENT
Start: 2018-07-22 | End: 2018-07-22

## 2018-07-22 RX ORDER — ONDANSETRON 8 MG/1
4 TABLET, FILM COATED ORAL EVERY 6 HOURS
Qty: 0 | Refills: 0 | Status: DISCONTINUED | OUTPATIENT
Start: 2018-07-22 | End: 2018-07-22

## 2018-07-22 RX ORDER — INSULIN GLARGINE 100 [IU]/ML
15 INJECTION, SOLUTION SUBCUTANEOUS AT BEDTIME
Qty: 0 | Refills: 0 | Status: DISCONTINUED | OUTPATIENT
Start: 2018-07-22 | End: 2018-07-23

## 2018-07-22 RX ORDER — DEXTROSE 50 % IN WATER 50 %
15 SYRINGE (ML) INTRAVENOUS ONCE
Qty: 0 | Refills: 0 | Status: DISCONTINUED | OUTPATIENT
Start: 2018-07-22 | End: 2018-07-26

## 2018-07-22 RX ORDER — ATORVASTATIN CALCIUM 80 MG/1
20 TABLET, FILM COATED ORAL AT BEDTIME
Qty: 0 | Refills: 0 | Status: DISCONTINUED | OUTPATIENT
Start: 2018-07-22 | End: 2018-07-26

## 2018-07-22 RX ORDER — KETOROLAC TROMETHAMINE 30 MG/ML
10 SYRINGE (ML) INJECTION
Qty: 0 | Refills: 0 | Status: DISCONTINUED | OUTPATIENT
Start: 2018-07-22 | End: 2018-07-22

## 2018-07-22 RX ORDER — OFLOXACIN 0.3 %
1 DROPS OPHTHALMIC (EYE) THREE TIMES A DAY
Qty: 0 | Refills: 0 | Status: DISCONTINUED | OUTPATIENT
Start: 2018-07-22 | End: 2018-07-22

## 2018-07-22 RX ORDER — PREDNISOLONE SODIUM PHOSPHATE 1 %
1 DROPS OPHTHALMIC (EYE)
Qty: 0 | Refills: 0 | Status: DISCONTINUED | OUTPATIENT
Start: 2018-07-22 | End: 2018-07-22

## 2018-07-22 RX ORDER — GLUCAGON INJECTION, SOLUTION 0.5 MG/.1ML
1 INJECTION, SOLUTION SUBCUTANEOUS ONCE
Qty: 0 | Refills: 0 | Status: DISCONTINUED | OUTPATIENT
Start: 2018-07-22 | End: 2018-07-22

## 2018-07-22 RX ORDER — LISINOPRIL 2.5 MG/1
10 TABLET ORAL DAILY
Qty: 0 | Refills: 0 | Status: DISCONTINUED | OUTPATIENT
Start: 2018-07-22 | End: 2018-07-26

## 2018-07-22 RX ORDER — KETOROLAC TROMETHAMINE 0.5 %
1 DROPS OPHTHALMIC (EYE) DAILY
Qty: 0 | Refills: 0 | Status: DISCONTINUED | OUTPATIENT
Start: 2018-07-22 | End: 2018-07-26

## 2018-07-22 RX ORDER — CYCLOBENZAPRINE HYDROCHLORIDE 10 MG/1
5 TABLET, FILM COATED ORAL DAILY
Qty: 0 | Refills: 0 | Status: DISCONTINUED | OUTPATIENT
Start: 2018-07-22 | End: 2018-07-26

## 2018-07-22 RX ORDER — INSULIN LISPRO 100/ML
VIAL (ML) SUBCUTANEOUS
Qty: 0 | Refills: 0 | Status: DISCONTINUED | OUTPATIENT
Start: 2018-07-22 | End: 2018-07-22

## 2018-07-22 RX ADMIN — SODIUM CHLORIDE 100 MILLILITER(S): 9 INJECTION INTRAMUSCULAR; INTRAVENOUS; SUBCUTANEOUS at 18:34

## 2018-07-22 RX ADMIN — SODIUM CHLORIDE 3 MILLILITER(S): 9 INJECTION INTRAMUSCULAR; INTRAVENOUS; SUBCUTANEOUS at 10:08

## 2018-07-22 RX ADMIN — Medication 400 MILLIGRAM(S): at 18:33

## 2018-07-22 RX ADMIN — HYDROMORPHONE HYDROCHLORIDE 1 MILLIGRAM(S): 2 INJECTION INTRAMUSCULAR; INTRAVENOUS; SUBCUTANEOUS at 11:15

## 2018-07-22 RX ADMIN — HYDROMORPHONE HYDROCHLORIDE 1 MILLIGRAM(S): 2 INJECTION INTRAMUSCULAR; INTRAVENOUS; SUBCUTANEOUS at 10:35

## 2018-07-22 RX ADMIN — HYDROMORPHONE HYDROCHLORIDE 1 MILLIGRAM(S): 2 INJECTION INTRAMUSCULAR; INTRAVENOUS; SUBCUTANEOUS at 12:15

## 2018-07-22 RX ADMIN — MORPHINE SULFATE 4 MILLIGRAM(S): 50 CAPSULE, EXTENDED RELEASE ORAL at 15:58

## 2018-07-22 NOTE — ED PROVIDER NOTE - OBJECTIVE STATEMENT
59 y/o female with a PMHx of HTN, HLD, DM, DJD, kidney stones, GERD, glaucoma, visually/legally blind presents to the ED c/o right flank and RLQ pain starting last night and gradually worsening. +nausea. Denies emesis, chills, fevers, hematuria, melena, neurological deficits. 59 y/o female with a PMHx of HTN, HLD, DM, DJD, kidney stones, GERD, glaucoma, visually/legally blind presents to the ED c/o right flank and RLQ pain starting last night and gradually worsening. +nausea. Denies emesis, chills, fevers, hematuria, melena, neurological deficits. No neck stiffness. No blood in stool. No skin rash. No visual complaints. No focal motor weakness complaints. No incontinence. No saddle anesthesia. No recent exotic travel or surgeries.

## 2018-07-22 NOTE — ED PROVIDER NOTE - NS_ ATTENDINGSCRIBEDETAILS _ED_A_ED_FT
I Kulwinder Santiago MD saw and examined the patient. Scribe documented for me and under my supervision. I have modified the scribe's documentation where necessary to reflect my history, physical exam and other relevant documentations pertinent to the care of the patient.

## 2018-07-22 NOTE — H&P ADULT - NSHPREVIEWOFSYSTEMS_GEN_ALL_CORE
12 point review of systems is limited due to pt pain and hesitancy to report however is negative unless indicated above.

## 2018-07-22 NOTE — ED PROVIDER NOTE - PSH
[place] filter in place  left  H/O tubal ligation    History of loop recorder    Proliferative diabetic retinopathy of left eye  s/p PPV/laser/silicone  5/31/16 and 11/29/16 OS  Shoulder disorder  left

## 2018-07-22 NOTE — ED PROVIDER NOTE - RESPIRATORY, MLM
Breath sounds clear and equal bilaterally. Breath sounds clear and equal bilaterally. No retractions or tachypnea.

## 2018-07-22 NOTE — H&P ADULT - HISTORY OF PRESENT ILLNESS
57 y/o female with a PMHx of HTN, HLD, DM, DJD, kidney stones, GERD, glaucoma, visually/legally blind complaining of right flank and RLQ pain that started on the night pta and gradually worsened. Pt does not provide full history due to severe pain however reports nausea.  No other complaints at present.  Pt seen by Dr. Merritt in the ED and to be taken to the OR for stent from the ED.

## 2018-07-22 NOTE — H&P ADULT - NSHPLABSRESULTS_GEN_ALL_CORE
13.7   15.77 )-----------( 316      ( 22 Jul 2018 10:09 )             40.0     136  |  103  |  12  ----------------------------<  217<H>  5.6<H>   |  27  |  0.84    Ca    9.0      22 Jul 2018 10:09    TPro  7.5  /  Alb  3.6  /  TBili  0.5  /  DBili  x   /  AST  40<H>  /  ALT  33  /  AlkPhos  128<H>  07-22    LIVER FUNCTIONS - ( 22 Jul 2018 10:09 )  Alb: 3.6 g/dL / Pro: 7.5 gm/dL / ALK PHOS: 128 U/L / ALT: 33 U/L / AST: 40 U/L / GGT: x           PT/INR - ( 22 Jul 2018 10:09 )   PT: 10.2 sec;   INR: 0.95 ratio    PTT - ( 22 Jul 2018 10:09 )  PTT:27.1 sec    < from: CT Abdomen and Pelvis w/ IV Cont (07.22.18 @ 11:51) >      IMPRESSION:   Mild right hydroureteronephrosis with what appears to be a faintly   calcified 11 mm stone in the proximal ureter. Associated thickening of   the proximal ureter which is most likely  Infectious/inflammatory.  Urologic consultation and short-term follow-up CT urogram recommended, as   tumor is not completely excluded.

## 2018-07-22 NOTE — H&P ADULT - NSHPPHYSICALEXAM_GEN_ALL_CORE
Constitutional: In distress, anxious, uncomfortable  HEENT: EOMI, Normal Hearing, MMM  Neck: No JVD  Back: R Flank ttp  Respiratory: CTABL  Cardiovascular: S1 and S2, RRR, no M/G/R  Gastrointestinal: BS+, soft, NT/ND  Extremities: No peripheral edema  Vascular: 2+ peripheral pulses  Musculoskeletal: 5/5 strength b/l upper and lower extremities  Skin: No rashes

## 2018-07-22 NOTE — BRIEF OPERATIVE NOTE - PROCEDURE
<<-----Click on this checkbox to enter Procedure Cystoscopy with insertion of stent  07/22/2018    Active  [facility]ODI

## 2018-07-22 NOTE — H&P ADULT - ASSESSMENT
59 y/o female with a PMHx of HTN, HLD, DM, DJD, kidney stones, GERD, glaucoma, visually/legally blind complaining of right flank and RLQ pain that started on the night pta and gradually worsened.  Admitted for nephrolithiasis.    # Right 11mm ureter stone with mild hydronephrosis and pyelonephritis  - Admit to med-surg  - UA and Ucx not able to be obtained in ED. Bcx were not ordered in ED.  - CT A/P with 11mm stone in prox R ureter, mild hydronephrosis and thickening of the prox ureter likely infectious/inflammatory  - Dr. Merritt evaluated pt in the ED and is taking pt to the OR for stent placement   - IV rocephin  - IVF and flomax    # Htn, HLD, DM, DJD, GERD, Glaucoma, Blindness  - continue home meds and drop    VTE ppx: SCDs 59 y/o female with a PMHx of HTN, HLD, DM, DJD, kidney stones, GERD, glaucoma, visually/legally blind complaining of right flank and RLQ pain that started on the night pta and gradually worsened.  Admitted for nephrolithiasis.    # Right 11mm ureter stone with mild hydronephrosis and pyelonephritis  - Admit to med-surg  - UA and Ucx not able to be obtained in ED. Bcx were not ordered in ED.  - CT A/P with 11mm stone in prox R ureter, mild hydronephrosis and thickening of the prox ureter likely infectious/inflammatory  - Dr. Merritt evaluated pt in the ED and is taking pt to the OR for stent placement   - IV rocephin  - IVF and flomax    # Hyperkalemia  - IVF  - rpt in AM    # Htn, HLD, DM, DJD, GERD, Glaucoma, Blindness  - continue home meds and drop    VTE ppx: SCDs

## 2018-07-22 NOTE — PATIENT PROFILE ADULT. - VISION (WITH CORRECTIVE LENSES IF THE PATIENT USUALLY WEARS THEM):
Partially impaired: cannot see medication labels or newsprint, but can see obstacles in path, and the surrounding layout; can count fingers at arm's length/wears glasses- 1 pr with patient at hospital

## 2018-07-22 NOTE — CONSULT NOTE ADULT - PROBLEM SELECTOR RECOMMENDATION 9
A discussion of options was had with the patient and a ureteral stent was suggested, and all risks reviewed.  She consented to this and the stone will be dealt with at later date

## 2018-07-22 NOTE — ED PROVIDER NOTE - PROGRESS NOTE DETAILS
Gregory Santiago: Spoke with Urology Dr. Merritt who states given pt WBC, amount of pain and declining 11mm stone, pt is to be admitted to medicine and will see pt for stent placement. Gregory Santiago: Emergency contact information for pt: Alejandra Pino [phone] Gregory Santiago: Spoke with Urology Dr. Merritt who states given pt WBC, amount of pain and descending 11mm stone, pt is to be admitted to medicine and will see pt for stent placement. Urology consultation is appreciated.

## 2018-07-22 NOTE — ED ADULT NURSE NOTE - CHPI ED SYMPTOMS NEG
no abdominal distension/no diarrhea/no chills/no burning urination/no hematuria/no blood in stool/no dysuria

## 2018-07-22 NOTE — ED PROVIDER NOTE - MUSCULOSKELETAL, MLM
Spine appears normal, range of motion is not limited, no muscle or joint tenderness Spine appears normal, range of motion is not limited, no muscle or joint tenderness. 5/5 strength in all limbs. No nuchal rigidity.

## 2018-07-22 NOTE — ED PROVIDER NOTE - PMH
Constipation    DJD (degenerative joint disease)    Dvt femoral (deep venous thrombosis)  left leg in 2014  HLD (hyperlipidemia)    Hypertension    Syncope    Type 2 diabetes mellitus    Vitamin D deficiency

## 2018-07-22 NOTE — PATIENT PROFILE ADULT. - PSH
[place] filter in place  left  H/O tubal ligation    History of loop recorder    Proliferative diabetic retinopathy of left eye  s/p PPV/laser/silicone  5/31/16 and 11/29/16 OS  Shoulder disorder  left

## 2018-07-23 ENCOUNTER — APPOINTMENT (OUTPATIENT)
Dept: ELECTROPHYSIOLOGY | Facility: CLINIC | Age: 58
End: 2018-07-23
Payer: MEDICAID

## 2018-07-23 LAB
ANION GAP SERPL CALC-SCNC: 7 MMOL/L — SIGNIFICANT CHANGE UP (ref 5–17)
APPEARANCE UR: CLEAR — SIGNIFICANT CHANGE UP
BACTERIA # UR AUTO: ABNORMAL
BASOPHILS # BLD AUTO: 0.02 K/UL — SIGNIFICANT CHANGE UP (ref 0–0.2)
BASOPHILS NFR BLD AUTO: 0.1 % — SIGNIFICANT CHANGE UP (ref 0–2)
BILIRUB UR-MCNC: NEGATIVE — SIGNIFICANT CHANGE UP
BUN SERPL-MCNC: 18 MG/DL — SIGNIFICANT CHANGE UP (ref 7–23)
CALCIUM SERPL-MCNC: 8.3 MG/DL — LOW (ref 8.5–10.1)
CHLORIDE SERPL-SCNC: 102 MMOL/L — SIGNIFICANT CHANGE UP (ref 96–108)
CO2 SERPL-SCNC: 25 MMOL/L — SIGNIFICANT CHANGE UP (ref 22–31)
COLOR SPEC: YELLOW — SIGNIFICANT CHANGE UP
CREAT SERPL-MCNC: 1.01 MG/DL — SIGNIFICANT CHANGE UP (ref 0.5–1.3)
DIFF PNL FLD: ABNORMAL
EOSINOPHIL # BLD AUTO: 0 K/UL — SIGNIFICANT CHANGE UP (ref 0–0.5)
EOSINOPHIL NFR BLD AUTO: 0 % — SIGNIFICANT CHANGE UP (ref 0–6)
EPI CELLS # UR: ABNORMAL
GLUCOSE BLDC GLUCOMTR-MCNC: 287 MG/DL — HIGH (ref 70–99)
GLUCOSE BLDC GLUCOMTR-MCNC: 321 MG/DL — HIGH (ref 70–99)
GLUCOSE BLDC GLUCOMTR-MCNC: 368 MG/DL — HIGH (ref 70–99)
GLUCOSE BLDC GLUCOMTR-MCNC: 379 MG/DL — HIGH (ref 70–99)
GLUCOSE BLDC GLUCOMTR-MCNC: 411 MG/DL — HIGH (ref 70–99)
GLUCOSE BLDC GLUCOMTR-MCNC: 436 MG/DL — HIGH (ref 70–99)
GLUCOSE SERPL-MCNC: 389 MG/DL — HIGH (ref 70–99)
GLUCOSE UR QL: 1000 MG/DL
HBA1C BLD-MCNC: 9.2 % — HIGH (ref 4–5.6)
HCT VFR BLD CALC: 37.1 % — SIGNIFICANT CHANGE UP (ref 34.5–45)
HGB BLD-MCNC: 12.6 G/DL — SIGNIFICANT CHANGE UP (ref 11.5–15.5)
IMM GRANULOCYTES NFR BLD AUTO: 0.4 % — SIGNIFICANT CHANGE UP (ref 0–1.5)
KETONES UR-MCNC: NEGATIVE — SIGNIFICANT CHANGE UP
LACTATE SERPL-SCNC: 2 MMOL/L — SIGNIFICANT CHANGE UP (ref 0.7–2)
LEUKOCYTE ESTERASE UR-ACNC: ABNORMAL
LYMPHOCYTES # BLD AUTO: 16.2 % — SIGNIFICANT CHANGE UP (ref 13–44)
LYMPHOCYTES # BLD AUTO: 2.32 K/UL — SIGNIFICANT CHANGE UP (ref 1–3.3)
MCHC RBC-ENTMCNC: 30.1 PG — SIGNIFICANT CHANGE UP (ref 27–34)
MCHC RBC-ENTMCNC: 34 GM/DL — SIGNIFICANT CHANGE UP (ref 32–36)
MCV RBC AUTO: 88.8 FL — SIGNIFICANT CHANGE UP (ref 80–100)
MONOCYTES # BLD AUTO: 0.77 K/UL — SIGNIFICANT CHANGE UP (ref 0–0.9)
MONOCYTES NFR BLD AUTO: 5.4 % — SIGNIFICANT CHANGE UP (ref 2–14)
NEUTROPHILS # BLD AUTO: 11.19 K/UL — HIGH (ref 1.8–7.4)
NEUTROPHILS NFR BLD AUTO: 77.9 % — HIGH (ref 43–77)
NITRITE UR-MCNC: POSITIVE
NRBC # BLD: 0 /100 WBCS — SIGNIFICANT CHANGE UP (ref 0–0)
PH UR: 6 — SIGNIFICANT CHANGE UP (ref 5–8)
PLATELET # BLD AUTO: 266 K/UL — SIGNIFICANT CHANGE UP (ref 150–400)
POTASSIUM SERPL-MCNC: 4.7 MMOL/L — SIGNIFICANT CHANGE UP (ref 3.5–5.3)
POTASSIUM SERPL-SCNC: 4.7 MMOL/L — SIGNIFICANT CHANGE UP (ref 3.5–5.3)
PROT UR-MCNC: 30 MG/DL
RBC # BLD: 4.18 M/UL — SIGNIFICANT CHANGE UP (ref 3.8–5.2)
RBC # FLD: 12.7 % — SIGNIFICANT CHANGE UP (ref 10.3–14.5)
RBC CASTS # UR COMP ASSIST: ABNORMAL /HPF (ref 0–4)
SODIUM SERPL-SCNC: 134 MMOL/L — LOW (ref 135–145)
SP GR SPEC: 1.01 — SIGNIFICANT CHANGE UP (ref 1.01–1.02)
UROBILINOGEN FLD QL: NEGATIVE MG/DL — SIGNIFICANT CHANGE UP
WBC # BLD: 14.36 K/UL — HIGH (ref 3.8–10.5)
WBC # FLD AUTO: 14.36 K/UL — HIGH (ref 3.8–10.5)
WBC UR QL: ABNORMAL

## 2018-07-23 PROCEDURE — 93010 ELECTROCARDIOGRAM REPORT: CPT

## 2018-07-23 PROCEDURE — 93298 REM INTERROG DEV EVAL SCRMS: CPT

## 2018-07-23 PROCEDURE — 74018 RADEX ABDOMEN 1 VIEW: CPT | Mod: 26

## 2018-07-23 PROCEDURE — 74150 CT ABDOMEN W/O CONTRAST: CPT | Mod: 26

## 2018-07-23 PROCEDURE — 99231 SBSQ HOSP IP/OBS SF/LOW 25: CPT

## 2018-07-23 PROCEDURE — 93299: CPT

## 2018-07-23 RX ORDER — CIPROFLOXACIN LACTATE 400MG/40ML
400 VIAL (ML) INTRAVENOUS ONCE
Qty: 0 | Refills: 0 | Status: COMPLETED | OUTPATIENT
Start: 2018-07-23 | End: 2018-07-23

## 2018-07-23 RX ORDER — INSULIN LISPRO 100/ML
2 VIAL (ML) SUBCUTANEOUS
Qty: 0 | Refills: 0 | Status: DISCONTINUED | OUTPATIENT
Start: 2018-07-23 | End: 2018-07-24

## 2018-07-23 RX ORDER — KETOROLAC TROMETHAMINE 30 MG/ML
10 SYRINGE (ML) INJECTION EVERY 6 HOURS
Qty: 0 | Refills: 0 | Status: DISCONTINUED | OUTPATIENT
Start: 2018-07-23 | End: 2018-07-23

## 2018-07-23 RX ORDER — CIPROFLOXACIN LACTATE 400MG/40ML
400 VIAL (ML) INTRAVENOUS EVERY 12 HOURS
Qty: 0 | Refills: 0 | Status: DISCONTINUED | OUTPATIENT
Start: 2018-07-24 | End: 2018-07-24

## 2018-07-23 RX ORDER — ACETAMINOPHEN 500 MG
1000 TABLET ORAL ONCE
Qty: 0 | Refills: 0 | Status: COMPLETED | OUTPATIENT
Start: 2018-07-23 | End: 2018-07-23

## 2018-07-23 RX ORDER — INSULIN GLARGINE 100 [IU]/ML
20 INJECTION, SOLUTION SUBCUTANEOUS AT BEDTIME
Qty: 0 | Refills: 0 | Status: DISCONTINUED | OUTPATIENT
Start: 2018-07-23 | End: 2018-07-24

## 2018-07-23 RX ORDER — KETOROLAC TROMETHAMINE 30 MG/ML
30 SYRINGE (ML) INJECTION ONCE
Qty: 0 | Refills: 0 | Status: DISCONTINUED | OUTPATIENT
Start: 2018-07-23 | End: 2018-07-23

## 2018-07-23 RX ORDER — CIPROFLOXACIN LACTATE 400MG/40ML
VIAL (ML) INTRAVENOUS
Qty: 0 | Refills: 0 | Status: DISCONTINUED | OUTPATIENT
Start: 2018-07-23 | End: 2018-07-24

## 2018-07-23 RX ORDER — MORPHINE SULFATE 50 MG/1
2 CAPSULE, EXTENDED RELEASE ORAL EVERY 4 HOURS
Qty: 0 | Refills: 0 | Status: DISCONTINUED | OUTPATIENT
Start: 2018-07-23 | End: 2018-07-23

## 2018-07-23 RX ORDER — KETOROLAC TROMETHAMINE 30 MG/ML
10 SYRINGE (ML) INJECTION EVERY 6 HOURS
Qty: 0 | Refills: 0 | Status: DISCONTINUED | OUTPATIENT
Start: 2018-07-23 | End: 2018-07-25

## 2018-07-23 RX ADMIN — INSULIN GLARGINE 20 UNIT(S): 100 INJECTION, SOLUTION SUBCUTANEOUS at 21:58

## 2018-07-23 RX ADMIN — Medication 1 DROP(S): at 12:05

## 2018-07-23 RX ADMIN — Medication 12: at 12:05

## 2018-07-23 RX ADMIN — Medication 1 DROP(S): at 17:27

## 2018-07-23 RX ADMIN — Medication 1 DROP(S): at 21:13

## 2018-07-23 RX ADMIN — MORPHINE SULFATE 2 MILLIGRAM(S): 50 CAPSULE, EXTENDED RELEASE ORAL at 05:24

## 2018-07-23 RX ADMIN — Medication 1 DROP(S): at 00:41

## 2018-07-23 RX ADMIN — INSULIN GLARGINE 20 UNIT(S): 100 INJECTION, SOLUTION SUBCUTANEOUS at 08:27

## 2018-07-23 RX ADMIN — ONDANSETRON 4 MILLIGRAM(S): 8 TABLET, FILM COATED ORAL at 08:54

## 2018-07-23 RX ADMIN — Medication 10 MILLIGRAM(S): at 18:30

## 2018-07-23 RX ADMIN — GABAPENTIN 300 MILLIGRAM(S): 400 CAPSULE ORAL at 17:32

## 2018-07-23 RX ADMIN — CYCLOBENZAPRINE HYDROCHLORIDE 5 MILLIGRAM(S): 10 TABLET, FILM COATED ORAL at 09:49

## 2018-07-23 RX ADMIN — DORZOLAMIDE HYDROCHLORIDE TIMOLOL MALEATE 1 DROP(S): 20; 5 SOLUTION/ DROPS OPHTHALMIC at 05:44

## 2018-07-23 RX ADMIN — MORPHINE SULFATE 2 MILLIGRAM(S): 50 CAPSULE, EXTENDED RELEASE ORAL at 00:11

## 2018-07-23 RX ADMIN — DORZOLAMIDE HYDROCHLORIDE TIMOLOL MALEATE 1 DROP(S): 20; 5 SOLUTION/ DROPS OPHTHALMIC at 17:27

## 2018-07-23 RX ADMIN — Medication 1 DROP(S): at 05:46

## 2018-07-23 RX ADMIN — TAMSULOSIN HYDROCHLORIDE 0.4 MILLIGRAM(S): 0.4 CAPSULE ORAL at 00:42

## 2018-07-23 RX ADMIN — Medication 200 MILLIGRAM(S): at 17:38

## 2018-07-23 RX ADMIN — Medication 1 DROP(S): at 05:45

## 2018-07-23 RX ADMIN — Medication 1 DROP(S): at 17:36

## 2018-07-23 RX ADMIN — Medication 10: at 17:26

## 2018-07-23 RX ADMIN — Medication 8: at 08:26

## 2018-07-23 RX ADMIN — Medication 3: at 21:58

## 2018-07-23 RX ADMIN — Medication 400 MILLIGRAM(S): at 21:58

## 2018-07-23 RX ADMIN — Medication 30 MILLIGRAM(S): at 13:51

## 2018-07-23 RX ADMIN — TAMSULOSIN HYDROCHLORIDE 0.4 MILLIGRAM(S): 0.4 CAPSULE ORAL at 21:13

## 2018-07-23 RX ADMIN — Medication 2 UNIT(S): at 17:26

## 2018-07-23 RX ADMIN — Medication 1 DROP(S): at 13:52

## 2018-07-23 RX ADMIN — Medication 2: at 00:39

## 2018-07-23 RX ADMIN — ATORVASTATIN CALCIUM 20 MILLIGRAM(S): 80 TABLET, FILM COATED ORAL at 21:13

## 2018-07-23 RX ADMIN — LISINOPRIL 10 MILLIGRAM(S): 2.5 TABLET ORAL at 05:39

## 2018-07-23 RX ADMIN — ATORVASTATIN CALCIUM 20 MILLIGRAM(S): 80 TABLET, FILM COATED ORAL at 00:37

## 2018-07-23 RX ADMIN — PANTOPRAZOLE SODIUM 40 MILLIGRAM(S): 20 TABLET, DELAYED RELEASE ORAL at 05:39

## 2018-07-23 RX ADMIN — PANTOPRAZOLE SODIUM 40 MILLIGRAM(S): 20 TABLET, DELAYED RELEASE ORAL at 17:29

## 2018-07-23 RX ADMIN — INSULIN GLARGINE 15 UNIT(S): 100 INJECTION, SOLUTION SUBCUTANEOUS at 00:38

## 2018-07-23 RX ADMIN — Medication 10 MILLIGRAM(S): at 18:03

## 2018-07-23 NOTE — PROGRESS NOTE ADULT - ASSESSMENT
58F.  admitted 07/22/18.  presented to ED c/o right flank and RLQ pain.  onset evening before admission.  in ED, evaluated by Dr. eMrritt and taken to the OR for a stent placement for 11mm ureteral stone.     PMHx:  syncope-ILR;  left leg DVT (2014)-IVCF;  HTN;  DM;  HLD;  constipation;  GERD;  DJD;  vitamin D deficiency;  glaucoma-blind.      ureterolithiasis.  -CT AB/pelvis 11mm ureter stone w/ mild HN + pyelonephritis.  -07/22 cystoscopy and stent placement.  -UCx.  -IV ABx.  -IVFs.  -Flomax.  -pain management. 58F.  admitted 07/22/18.  presented to ED c/o right flank and RLQ pain.  onset evening before admission.  in ED, evaluated by Dr. Merritt and taken to the OR for a stent placement for 11mm ureteral stone.     PMHx:  syncope-ILR;  left leg DVT (2014)-IVCF;  HTN;  DM;  HLD;  constipation;  GERD;  DJD;  vitamin D deficiency;  glaucoma-blind.      ureterolithiasis.  -CT AB/pelvis 11mm ureter stone w/ mild HN + pyelonephritis.  -07/22 cystoscopy and stent placement.  -UCx.  -IV ABx.  -IVFs.  -Flomax.  -pain management.    DM.  -FSBG trend reviewed.  target 140-180mg/dL.  -A1C.  -escalate Lantus.  -add premeal lispro.  -c/w correction insulin coverage.    DVT prophylaxis.  -SCD.    disposition.  -5S.    communication.  -RN.  -Case Management. 58F.  admitted 07/22/18.  presented to ED c/o right flank and RLQ pain.  onset evening before admission.  in ED, evaluated by Dr. Merritt and taken to the OR for a stent placement for 11mm ureteral stone.     PMHx:  syncope-ILR;  left leg DVT (2014)-IVCF;  HTN;  DM;  HLD;  constipation;  GERD;  DJD;  vitamin D deficiency;  glaucoma-blind.      ureterolithiasis.  -CT AB/pelvis 11mm ureter stone w/ mild HN + pyelonephritis.  -07/22 cystoscopy and stent placement.  -UA + UCx.  -IV ABx.  -IVFs.  -Flomax.  -pain management.  -Urology.  -ID.    DM.  -FSBG trend reviewed.  target 140-180mg/dL.  -A1C.  -escalate Lantus.  -add premeal lispro.  -c/w correction insulin coverage.    DVT prophylaxis.  -SCD.    disposition.  -5S.    communication.  -RN.  -Case Management.

## 2018-07-23 NOTE — PROGRESS NOTE ADULT - PROBLEM SELECTOR PLAN 1
I will order a KUB and limited CAT scan to localize the stone.  Further management will be as on outpatient.

## 2018-07-24 DIAGNOSIS — N20.0 CALCULUS OF KIDNEY: ICD-10-CM

## 2018-07-24 LAB
ANION GAP SERPL CALC-SCNC: 9 MMOL/L — SIGNIFICANT CHANGE UP (ref 5–17)
BUN SERPL-MCNC: 24 MG/DL — HIGH (ref 7–23)
CALCIUM SERPL-MCNC: 8.6 MG/DL — SIGNIFICANT CHANGE UP (ref 8.5–10.1)
CHLORIDE SERPL-SCNC: 105 MMOL/L — SIGNIFICANT CHANGE UP (ref 96–108)
CO2 SERPL-SCNC: 26 MMOL/L — SIGNIFICANT CHANGE UP (ref 22–31)
CREAT SERPL-MCNC: 1.09 MG/DL — SIGNIFICANT CHANGE UP (ref 0.5–1.3)
GLUCOSE BLDC GLUCOMTR-MCNC: 263 MG/DL — HIGH (ref 70–99)
GLUCOSE BLDC GLUCOMTR-MCNC: 324 MG/DL — HIGH (ref 70–99)
GLUCOSE BLDC GLUCOMTR-MCNC: 337 MG/DL — HIGH (ref 70–99)
GLUCOSE BLDC GLUCOMTR-MCNC: 370 MG/DL — HIGH (ref 70–99)
GLUCOSE SERPL-MCNC: 351 MG/DL — HIGH (ref 70–99)
HCT VFR BLD CALC: 38.6 % — SIGNIFICANT CHANGE UP (ref 34.5–45)
HGB BLD-MCNC: 12.8 G/DL — SIGNIFICANT CHANGE UP (ref 11.5–15.5)
MCHC RBC-ENTMCNC: 29.3 PG — SIGNIFICANT CHANGE UP (ref 27–34)
MCHC RBC-ENTMCNC: 33.2 GM/DL — SIGNIFICANT CHANGE UP (ref 32–36)
MCV RBC AUTO: 88.3 FL — SIGNIFICANT CHANGE UP (ref 80–100)
NRBC # BLD: 0 /100 WBCS — SIGNIFICANT CHANGE UP (ref 0–0)
PLATELET # BLD AUTO: 273 K/UL — SIGNIFICANT CHANGE UP (ref 150–400)
POTASSIUM SERPL-MCNC: 5.1 MMOL/L — SIGNIFICANT CHANGE UP (ref 3.5–5.3)
POTASSIUM SERPL-SCNC: 5.1 MMOL/L — SIGNIFICANT CHANGE UP (ref 3.5–5.3)
RBC # BLD: 4.37 M/UL — SIGNIFICANT CHANGE UP (ref 3.8–5.2)
RBC # FLD: 13.1 % — SIGNIFICANT CHANGE UP (ref 10.3–14.5)
SODIUM SERPL-SCNC: 140 MMOL/L — SIGNIFICANT CHANGE UP (ref 135–145)
WBC # BLD: 13.86 K/UL — HIGH (ref 3.8–10.5)
WBC # FLD AUTO: 13.86 K/UL — HIGH (ref 3.8–10.5)

## 2018-07-24 PROCEDURE — 99231 SBSQ HOSP IP/OBS SF/LOW 25: CPT

## 2018-07-24 RX ORDER — CEFEPIME 1 G/1
1000 INJECTION, POWDER, FOR SOLUTION INTRAMUSCULAR; INTRAVENOUS EVERY 12 HOURS
Qty: 0 | Refills: 0 | Status: DISCONTINUED | OUTPATIENT
Start: 2018-07-24 | End: 2018-07-26

## 2018-07-24 RX ORDER — INSULIN LISPRO 100/ML
4 VIAL (ML) SUBCUTANEOUS
Qty: 0 | Refills: 0 | Status: DISCONTINUED | OUTPATIENT
Start: 2018-07-24 | End: 2018-07-25

## 2018-07-24 RX ORDER — ALLOPURINOL 300 MG
100 TABLET ORAL DAILY
Qty: 0 | Refills: 0 | Status: DISCONTINUED | OUTPATIENT
Start: 2018-07-24 | End: 2018-07-26

## 2018-07-24 RX ORDER — INSULIN GLARGINE 100 [IU]/ML
22 INJECTION, SOLUTION SUBCUTANEOUS AT BEDTIME
Qty: 0 | Refills: 0 | Status: DISCONTINUED | OUTPATIENT
Start: 2018-07-24 | End: 2018-07-25

## 2018-07-24 RX ORDER — MORPHINE SULFATE 50 MG/1
2 CAPSULE, EXTENDED RELEASE ORAL ONCE
Qty: 0 | Refills: 0 | Status: DISCONTINUED | OUTPATIENT
Start: 2018-07-24 | End: 2018-07-24

## 2018-07-24 RX ORDER — INSULIN GLARGINE 100 [IU]/ML
22 INJECTION, SOLUTION SUBCUTANEOUS EVERY MORNING
Qty: 0 | Refills: 0 | Status: DISCONTINUED | OUTPATIENT
Start: 2018-07-24 | End: 2018-07-25

## 2018-07-24 RX ORDER — CITRIC ACID/SODIUM CITRATE 300-500 MG
30 SOLUTION, ORAL ORAL
Qty: 0 | Refills: 0 | Status: DISCONTINUED | OUTPATIENT
Start: 2018-07-24 | End: 2018-07-26

## 2018-07-24 RX ADMIN — Medication 1 DROP(S): at 13:22

## 2018-07-24 RX ADMIN — Medication 2 UNIT(S): at 17:20

## 2018-07-24 RX ADMIN — LISINOPRIL 10 MILLIGRAM(S): 2.5 TABLET ORAL at 05:21

## 2018-07-24 RX ADMIN — DORZOLAMIDE HYDROCHLORIDE TIMOLOL MALEATE 1 DROP(S): 20; 5 SOLUTION/ DROPS OPHTHALMIC at 05:23

## 2018-07-24 RX ADMIN — Medication 10 MILLIGRAM(S): at 01:53

## 2018-07-24 RX ADMIN — CYCLOBENZAPRINE HYDROCHLORIDE 5 MILLIGRAM(S): 10 TABLET, FILM COATED ORAL at 05:22

## 2018-07-24 RX ADMIN — Medication 30 MILLILITER(S): at 23:45

## 2018-07-24 RX ADMIN — Medication 10 MILLIGRAM(S): at 20:48

## 2018-07-24 RX ADMIN — Medication 1 DROP(S): at 11:41

## 2018-07-24 RX ADMIN — GABAPENTIN 300 MILLIGRAM(S): 400 CAPSULE ORAL at 23:45

## 2018-07-24 RX ADMIN — MORPHINE SULFATE 2 MILLIGRAM(S): 50 CAPSULE, EXTENDED RELEASE ORAL at 06:38

## 2018-07-24 RX ADMIN — TAMSULOSIN HYDROCHLORIDE 0.4 MILLIGRAM(S): 0.4 CAPSULE ORAL at 21:56

## 2018-07-24 RX ADMIN — Medication 100 MILLIGRAM(S): at 17:24

## 2018-07-24 RX ADMIN — PANTOPRAZOLE SODIUM 40 MILLIGRAM(S): 20 TABLET, DELAYED RELEASE ORAL at 17:24

## 2018-07-24 RX ADMIN — CEFEPIME 1000 MILLIGRAM(S): 1 INJECTION, POWDER, FOR SOLUTION INTRAMUSCULAR; INTRAVENOUS at 17:19

## 2018-07-24 RX ADMIN — Medication 10: at 17:21

## 2018-07-24 RX ADMIN — INSULIN GLARGINE 20 UNIT(S): 100 INJECTION, SOLUTION SUBCUTANEOUS at 08:18

## 2018-07-24 RX ADMIN — Medication 10 MILLIGRAM(S): at 13:40

## 2018-07-24 RX ADMIN — Medication 10 MILLIGRAM(S): at 07:55

## 2018-07-24 RX ADMIN — Medication 200 MILLIGRAM(S): at 05:20

## 2018-07-24 RX ADMIN — Medication 2 UNIT(S): at 11:41

## 2018-07-24 RX ADMIN — DORZOLAMIDE HYDROCHLORIDE TIMOLOL MALEATE 1 DROP(S): 20; 5 SOLUTION/ DROPS OPHTHALMIC at 17:22

## 2018-07-24 RX ADMIN — GABAPENTIN 300 MILLIGRAM(S): 400 CAPSULE ORAL at 11:44

## 2018-07-24 RX ADMIN — Medication 10 MILLIGRAM(S): at 13:24

## 2018-07-24 RX ADMIN — INSULIN GLARGINE 22 UNIT(S): 100 INJECTION, SOLUTION SUBCUTANEOUS at 22:37

## 2018-07-24 RX ADMIN — Medication 2 UNIT(S): at 07:33

## 2018-07-24 RX ADMIN — Medication 1 DROP(S): at 05:22

## 2018-07-24 RX ADMIN — Medication 8: at 11:41

## 2018-07-24 RX ADMIN — Medication 1 DROP(S): at 05:23

## 2018-07-24 RX ADMIN — Medication 1 DROP(S): at 21:57

## 2018-07-24 RX ADMIN — ATORVASTATIN CALCIUM 20 MILLIGRAM(S): 80 TABLET, FILM COATED ORAL at 21:56

## 2018-07-24 RX ADMIN — Medication 8: at 07:35

## 2018-07-24 RX ADMIN — Medication 30 MILLILITER(S): at 17:28

## 2018-07-24 RX ADMIN — Medication 10 MILLIGRAM(S): at 07:41

## 2018-07-24 RX ADMIN — PANTOPRAZOLE SODIUM 40 MILLIGRAM(S): 20 TABLET, DELAYED RELEASE ORAL at 05:21

## 2018-07-24 RX ADMIN — Medication 1 DROP(S): at 17:22

## 2018-07-24 RX ADMIN — Medication 1: at 21:56

## 2018-07-24 NOTE — PROGRESS NOTE ADULT - PROBLEM SELECTOR PLAN 1
The stone is probably uric acid and may be dissolved with alkalinization,  I will start Bicitra and Allopurinol and a renal sonogram can be done in 10 days to see if the stones have responded to this at which time the stent can be removed in the office.

## 2018-07-24 NOTE — CONSULT NOTE ADULT - SUBJECTIVE AND OBJECTIVE BOX
CHIEF COMPLAINT:Right flank pain    HISTORY OF PRESENT ILLNESS:Proximal right ureter    PAST MEDICAL & SURGICAL HISTORY:  HLD (hyperlipidemia)  Vitamin D deficiency  Constipation  Syncope  DJD (degenerative joint disease)  Dvt femoral (deep venous thrombosis): left leg in 2014  Hypertension  Type 2 diabetes mellitus  History of loop recorder  Shoulder disorder: left  Proliferative diabetic retinopathy of left eye: s/p PPV/laser/silicone  5/31/16 and 11/29/16 OS  [place] filter in place: left  H/O tubal ligation      REVIEW OF SYSTEMS:    CONSTITUTIONAL: In distress  EYES/ENT: No visual changes;  No vertigo or throat pain   NECK: No pain or stiffness  RESPIRATORY: No cough, wheezing, hemoptysis; No shortness of breath  CARDIOVASCULAR: No chest pain or palpitations  GASTROINTESTINAL: No abdominal or epigastric pain. No nausea, vomiting, or hematemesis; No diarrhea or constipation. No melena or hematochezia.  GENITOURINARY: No dysuria, frequency or hematuria  NEUROLOGICAL: No numbness or weakness  SKIN: No itching, burning, rashes, or lesions   All other review of systems is negative unless indicated above.    MEDICATIONS  (STANDING):    MEDICATIONS  (PRN):      Allergies    cumcumbers (Urticaria; Rash)  oxycodone (Urticaria; Rash)  penicillin (Rash)    Intolerance:NK        SOCIAL HISTORY:NK    FAMILY HISTORY:Non contributory      Vital Signs Last 24 Hrs  T(C): 36.6 (22 Jul 2018 09:15), Max: 36.6 (22 Jul 2018 09:15)  T(F): 97.9 (22 Jul 2018 09:15), Max: 97.9 (22 Jul 2018 09:15)  HR: 87 (22 Jul 2018 10:38) (87 - 89)  BP: 140/70 (22 Jul 2018 10:38) (140/70 - 153/89)  BP(mean): --  RR: 16 (22 Jul 2018 09:15) (16 - 16)  SpO2: 98% (22 Jul 2018 09:15) (98% - 98%)    PHYSICAL EXAM:    Constitutional: NAD, well-developed/in distress  HEENT: MAYELIN, EOMI, Normal Hearing, MMM  Neck: No LAD, No JVD  Back: Normal spine flexure, No CVA tenderness  Respiratory: CTAB   Cardiovascular: S1 and S2, RRR, no M/G/R  Abd: BS+, soft, NT/ND, No CVAT  : Normal phallus,open meatus,bilateral descended testes, no masses  JUDI: Normal prostate, no masses  Extremities: No peripheral edema  Vascular: 2+ peripheral pulses  Neurological: A/O x 3, no focal deficits  Psychiatric: Normal mood, normal affect  Musculoskeletal: 5/5 strength b/l upper and lower extremities  Skin: No rashes    LABS:                        13.7   15.77 )-----------( 316      ( 22 Jul 2018 10:09 )             40.0     07-22    136  |  103  |  12  ----------------------------<  217<H>  5.6<H>   |  27  |  0.84    Ca    9.0      22 Jul 2018 10:09    TPro  7.5  /  Alb  3.6  /  TBili  0.5  /  DBili  x   /  AST  40<H>  /  ALT  33  /  AlkPhos  128<H>  07-22    PT/INR - ( 22 Jul 2018 10:09 )   PT: 10.2 sec;   INR: 0.95 ratio         PTT - ( 22 Jul 2018 10:09 )  PTT:27.1 sec    Urine Culture:     RADIOLOGY & ADDITIONAL STUDIES:
Patient is a 58y old  Female who presents with a chief complaint of I was having a lot of pain and I was feeling it in my back (2018 19:55)    HPI:  57 y/o female with a PMHx of HTN, HLD, DM, DJD, kidney stones, GERD, glaucoma, visually/legally blind admitted on  for evaluation of right flank pain; upon admission noted to have mild hydronephrosis and has undergone right ureteral stent placement. No other specific complaints.          PMH: as above  PSH: as above  Meds: per reconciliation sheet, noted below  MEDICATIONS  (STANDING):  atorvastatin 20 milliGRAM(s) Oral at bedtime  cefepime  Injectable. 1000 milliGRAM(s) IV Push every 12 hours  dextrose 5%. 1000 milliLiter(s) (50 mL/Hr) IV Continuous <Continuous>  dextrose 50% Injectable 12.5 Gram(s) IV Push once  dextrose 50% Injectable 25 Gram(s) IV Push once  dextrose 50% Injectable 25 Gram(s) IV Push once  dorzolamide 2%/timolol 0.5% Ophthalmic Solution 1 Drop(s) Right EYE two times a day  insulin glargine Injectable (LANTUS) 20 Unit(s) SubCutaneous every morning  insulin glargine Injectable (LANTUS) 20 Unit(s) SubCutaneous at bedtime  insulin lispro (HumaLOG) corrective regimen sliding scale   SubCutaneous three times a day before meals  insulin lispro (HumaLOG) corrective regimen sliding scale   SubCutaneous at bedtime  insulin lispro Injectable (HumaLOG) 2 Unit(s) SubCutaneous three times a day before meals  ketorolac 0.5% Ophthalmic Solution 1 Drop(s) Left EYE daily  lisinopril 10 milliGRAM(s) Oral daily  ofloxacin 0.3% Solution 1 Drop(s) Left EYE three times a day  pantoprazole    Tablet 40 milliGRAM(s) Oral two times a day  prednisoLONE acetate 1% Suspension 1 Drop(s) Right EYE two times a day  tamsulosin 0.4 milliGRAM(s) Oral at bedtime    MEDICATIONS  (PRN):  artificial  tears Solution 1 Drop(s) Both EYES three times a day PRN Dry Eyes  cyclobenzaprine 5 milliGRAM(s) Oral daily PRN Muscle Spasm  dextrose 40% Gel 15 Gram(s) Oral once PRN Blood Glucose LESS THAN 70 milliGRAM(s)/deciliter  docusate sodium 100 milliGRAM(s) Oral two times a day PRN Constipation  gabapentin 300 milliGRAM(s) Oral three times a day PRN pain  glucagon  Injectable 1 milliGRAM(s) IntraMuscular once PRN Glucose LESS THAN 70 milligrams/deciliter  ketorolac   Injectable 10 milliGRAM(s) IV Push every 6 hours PRN Moderate Pain (4 - 6)  ondansetron Injectable 4 milliGRAM(s) IV Push every 6 hours PRN Nausea    Allergies    cumcumbers (Urticaria; Rash)  oxycodone (Urticaria; Rash)  penicillin (Rash)    Intolerances      Social: no smoking, no alcohol, no illegal drugs; no recent travel, no exposure to TB  FAMILY HISTORY:  No pertinent family history in first degree relatives     no history of premature cardiovascular disease in first degree relatives  ROS: the patient denies fever, no chills, no HA, no dizziness, no sore throat, no blurry vision, no CP, no palpitations, no SOB, no cough, no diarrhea, no N/V, no dysuria, no leg pain, no claudication, no rash, no joint aches, no rectal pain or bleeding, no night sweats  All other systems reviewed and are negative    Vital Signs Last 24 Hrs  T(C): 36.4 (2018 11:07), Max: 36.7 (2018 05:25)  T(F): 97.6 (2018 11:07), Max: 98 (2018 05:25)  HR: 84 (2018 11:07) (74 - 84)  BP: 110/64 (2018 11:07) (102/54 - 137/40)  BP(mean): --  RR: 16 (2018 11:07) (16 - 18)  SpO2: 96% (2018 11:07) (96% - 99%)  Daily     Daily     PE:    Constitutional: frail looking  HEENT: NC/AT, ears and nose atraumatic; pharynx clear  Neck: supple; thyroid not palpable  Back: right flank tenderness  Respiratory: respiratory effort normal; clear to auscultation  Cardiovascular: S1S2 regular, no murmurs  Abdomen: soft, not tender, not distended, positive BS; no liver or spleen organomegaly  Genitourinary: no suprapubic tenderness  Musculoskeletal: no muscle tenderness, no joint swelling or tenderness  Neurological/ Psychiatric: AxOx3, judgement and insight normal;  moving all extremities  Skin: no rashes; no palpable lesions    Labs: all available labs reviewed                        12.8   13.86 )-----------( 273      ( 2018 07:16 )             38.6     07-24    140  |  105  |  24<H>  ----------------------------<  351<H>  5.1   |  26  |  1.09    Ca    8.6      2018 07:16         Urinalysis Basic - ( 2018 17:20 )    Color: Yellow / Appearance: Clear / S.010 / pH: x  Gluc: x / Ketone: Negative  / Bili: Negative / Urobili: Negative mg/dL   Blood: x / Protein: 30 mg/dL / Nitrite: Positive   Leuk Esterase: Moderate / RBC: 6-10 /HPF / WBC 26-50   Sq Epi: x / Non Sq Epi: Moderate / Bacteria: Many    < from: CT Abdomen No Cont (18 @ 15:02) >    EXAM:  CT ABDOMEN ONLY                            PROCEDURE DATE:  2018          INTERPRETATION:  Clinical information: Ureteral stent placement. Localize   location of stone.    Technique: CT of the abdomen only was performed without the useof   intravenous or oral contrast.    COMPARISON: 2018.    FINDINGS: A right ureteral stent is in place, proximal tip in the right   renal pelvis. There has been resolution of right hydronephrosis.    Two right upper pole renal stones are again noted, appearing   approximately 3 mm. There is also a lower pole stone measuring 8 mm. This   appears new and may represent the stone previously seen in the proximal   ureter. There are no left renal stones.    No other significant changes are identified.    IMPRESSION:     Faintly radiodense 8mm calculus in the lower pole of the right kidney   likely representing the stone previously visualized in the proximal right   ureter.    Additional small stones in the upper pole of the right kidney.    < end of copied text >        Radiology: all available radiological tests reviewed    Advanced directives addressed: full resuscitation

## 2018-07-24 NOTE — CONSULT NOTE ADULT - ASSESSMENT
57 y/o female with a PMHx of HTN, HLD, DM, DJD, kidney stones, GERD, glaucoma, visually/legally blind admitted on 7/22 for evaluation of right flank pain; upon admission noted to have mild hydronephrosis and has undergone right ureteral stent placement. No other specific complaints.  1. Patient admitted with nephrolithiasis and obstructing stone; s/p ureteral stent placement; also noted with leukocytosis most likely reactive to infection  - follow up cultures   - iv hydration and supportive care   - serial cbc and monitor temperature   - reviewed prior medical records to evaluate for resistant or atypical pathogens   - urology eval in progress  - will optimize antibiotics to cefepime as remote penicillin allergies most times are due to cogeners in old penicillin formulations  - Monitor closely in view of history of penicillin allergy   2. other issues: HTN, HLD, DM, DJD, kidney stones, GERD, glaucoma, visually/legally blind  - per medicine

## 2018-07-24 NOTE — PROGRESS NOTE ADULT - ASSESSMENT
58F.  admitted 07/22/18.  presented to ED c/o right flank and RLQ pain.  onset evening before admission.  in ED, evaluated by Dr. Merritt and taken to the OR for a stent placement for 11mm ureteral stone.     PMHx:  syncope-ILR;  left leg DVT (2014)-IVCF;  HTN;  DM;  HLD;  constipation;  GERD;  DJD;  vitamin D deficiency;  glaucoma-blind.      ureterolithiasis.  -CT AB/pelvis 11mm ureter stone w/ mild HN + pyelonephritis.  -07/22 cystoscopy and stent placement.  -UA + UCx.  -IV ABx.  -IVFs.  -Flomax.  -pain management.  -Urology.  -ID.    DM.  -FSBG trend reviewed.  target 140-180mg/dL.  -A1C.  -increase Lantus 22units sq qAM + qhs.  -increase premeal lispro 4units sq qac.  -c/w correction insulin coverage.    DVT prophylaxis.  -SCD.    disposition.  -5S.    communication.  -RN.

## 2018-07-25 LAB
-  AMIKACIN: SIGNIFICANT CHANGE UP
-  AMIKACIN: SIGNIFICANT CHANGE UP
-  AMOXICILLIN/CLAVULANIC ACID: SIGNIFICANT CHANGE UP
-  AMOXICILLIN/CLAVULANIC ACID: SIGNIFICANT CHANGE UP
-  AMPICILLIN/SULBACTAM: SIGNIFICANT CHANGE UP
-  AMPICILLIN/SULBACTAM: SIGNIFICANT CHANGE UP
-  AMPICILLIN: SIGNIFICANT CHANGE UP
-  AMPICILLIN: SIGNIFICANT CHANGE UP
-  AZTREONAM: SIGNIFICANT CHANGE UP
-  AZTREONAM: SIGNIFICANT CHANGE UP
-  CEFAZOLIN: SIGNIFICANT CHANGE UP
-  CEFAZOLIN: SIGNIFICANT CHANGE UP
-  CEFEPIME: SIGNIFICANT CHANGE UP
-  CEFEPIME: SIGNIFICANT CHANGE UP
-  CEFOXITIN: SIGNIFICANT CHANGE UP
-  CEFOXITIN: SIGNIFICANT CHANGE UP
-  CEFTRIAXONE: SIGNIFICANT CHANGE UP
-  CEFTRIAXONE: SIGNIFICANT CHANGE UP
-  CIPROFLOXACIN: SIGNIFICANT CHANGE UP
-  CIPROFLOXACIN: SIGNIFICANT CHANGE UP
-  ERTAPENEM: SIGNIFICANT CHANGE UP
-  ERTAPENEM: SIGNIFICANT CHANGE UP
-  GENTAMICIN: SIGNIFICANT CHANGE UP
-  GENTAMICIN: SIGNIFICANT CHANGE UP
-  IMIPENEM: SIGNIFICANT CHANGE UP
-  IMIPENEM: SIGNIFICANT CHANGE UP
-  LEVOFLOXACIN: SIGNIFICANT CHANGE UP
-  LEVOFLOXACIN: SIGNIFICANT CHANGE UP
-  MEROPENEM: SIGNIFICANT CHANGE UP
-  MEROPENEM: SIGNIFICANT CHANGE UP
-  NITROFURANTOIN: SIGNIFICANT CHANGE UP
-  NITROFURANTOIN: SIGNIFICANT CHANGE UP
-  PIPERACILLIN/TAZOBACTAM: SIGNIFICANT CHANGE UP
-  PIPERACILLIN/TAZOBACTAM: SIGNIFICANT CHANGE UP
-  TIGECYCLINE: SIGNIFICANT CHANGE UP
-  TIGECYCLINE: SIGNIFICANT CHANGE UP
-  TOBRAMYCIN: SIGNIFICANT CHANGE UP
-  TOBRAMYCIN: SIGNIFICANT CHANGE UP
-  TRIMETHOPRIM/SULFAMETHOXAZOLE: SIGNIFICANT CHANGE UP
-  TRIMETHOPRIM/SULFAMETHOXAZOLE: SIGNIFICANT CHANGE UP
ANION GAP SERPL CALC-SCNC: 7 MMOL/L — SIGNIFICANT CHANGE UP (ref 5–17)
BUN SERPL-MCNC: 17 MG/DL — SIGNIFICANT CHANGE UP (ref 7–23)
CALCIUM SERPL-MCNC: 8.8 MG/DL — SIGNIFICANT CHANGE UP (ref 8.5–10.1)
CHLORIDE SERPL-SCNC: 103 MMOL/L — SIGNIFICANT CHANGE UP (ref 96–108)
CO2 SERPL-SCNC: 29 MMOL/L — SIGNIFICANT CHANGE UP (ref 22–31)
CREAT SERPL-MCNC: 0.91 MG/DL — SIGNIFICANT CHANGE UP (ref 0.5–1.3)
CULTURE RESULTS: SIGNIFICANT CHANGE UP
GLUCOSE BLDC GLUCOMTR-MCNC: 275 MG/DL — HIGH (ref 70–99)
GLUCOSE BLDC GLUCOMTR-MCNC: 276 MG/DL — HIGH (ref 70–99)
GLUCOSE BLDC GLUCOMTR-MCNC: 277 MG/DL — HIGH (ref 70–99)
GLUCOSE BLDC GLUCOMTR-MCNC: 338 MG/DL — HIGH (ref 70–99)
GLUCOSE SERPL-MCNC: 288 MG/DL — HIGH (ref 70–99)
HCT VFR BLD CALC: 37.1 % — SIGNIFICANT CHANGE UP (ref 34.5–45)
HGB BLD-MCNC: 12.4 G/DL — SIGNIFICANT CHANGE UP (ref 11.5–15.5)
MCHC RBC-ENTMCNC: 29.6 PG — SIGNIFICANT CHANGE UP (ref 27–34)
MCHC RBC-ENTMCNC: 33.4 GM/DL — SIGNIFICANT CHANGE UP (ref 32–36)
MCV RBC AUTO: 88.5 FL — SIGNIFICANT CHANGE UP (ref 80–100)
METHOD TYPE: SIGNIFICANT CHANGE UP
METHOD TYPE: SIGNIFICANT CHANGE UP
NRBC # BLD: 0 /100 WBCS — SIGNIFICANT CHANGE UP (ref 0–0)
ORGANISM # SPEC MICROSCOPIC CNT: SIGNIFICANT CHANGE UP
PLATELET # BLD AUTO: 273 K/UL — SIGNIFICANT CHANGE UP (ref 150–400)
POTASSIUM SERPL-MCNC: 4.4 MMOL/L — SIGNIFICANT CHANGE UP (ref 3.5–5.3)
POTASSIUM SERPL-SCNC: 4.4 MMOL/L — SIGNIFICANT CHANGE UP (ref 3.5–5.3)
RBC # BLD: 4.19 M/UL — SIGNIFICANT CHANGE UP (ref 3.8–5.2)
RBC # FLD: 13 % — SIGNIFICANT CHANGE UP (ref 10.3–14.5)
SODIUM SERPL-SCNC: 139 MMOL/L — SIGNIFICANT CHANGE UP (ref 135–145)
SPECIMEN SOURCE: SIGNIFICANT CHANGE UP
WBC # BLD: 11.32 K/UL — HIGH (ref 3.8–10.5)
WBC # FLD AUTO: 11.32 K/UL — HIGH (ref 3.8–10.5)

## 2018-07-25 PROCEDURE — 99231 SBSQ HOSP IP/OBS SF/LOW 25: CPT

## 2018-07-25 RX ORDER — INSULIN LISPRO 100/ML
6 VIAL (ML) SUBCUTANEOUS
Qty: 0 | Refills: 0 | Status: DISCONTINUED | OUTPATIENT
Start: 2018-07-25 | End: 2018-07-26

## 2018-07-25 RX ORDER — INSULIN GLARGINE 100 [IU]/ML
24 INJECTION, SOLUTION SUBCUTANEOUS
Qty: 0 | Refills: 0 | Status: DISCONTINUED | OUTPATIENT
Start: 2018-07-25 | End: 2018-07-26

## 2018-07-25 RX ADMIN — CEFEPIME 1000 MILLIGRAM(S): 1 INJECTION, POWDER, FOR SOLUTION INTRAMUSCULAR; INTRAVENOUS at 05:15

## 2018-07-25 RX ADMIN — Medication 1: at 21:20

## 2018-07-25 RX ADMIN — Medication 30 MILLILITER(S): at 16:49

## 2018-07-25 RX ADMIN — TAMSULOSIN HYDROCHLORIDE 0.4 MILLIGRAM(S): 0.4 CAPSULE ORAL at 21:18

## 2018-07-25 RX ADMIN — INSULIN GLARGINE 22 UNIT(S): 100 INJECTION, SOLUTION SUBCUTANEOUS at 11:12

## 2018-07-25 RX ADMIN — Medication 1 DROP(S): at 21:19

## 2018-07-25 RX ADMIN — Medication 6: at 07:46

## 2018-07-25 RX ADMIN — Medication 1 DROP(S): at 21:53

## 2018-07-25 RX ADMIN — Medication 10 MILLIGRAM(S): at 02:44

## 2018-07-25 RX ADMIN — CEFEPIME 1000 MILLIGRAM(S): 1 INJECTION, POWDER, FOR SOLUTION INTRAMUSCULAR; INTRAVENOUS at 16:49

## 2018-07-25 RX ADMIN — Medication 100 MILLIGRAM(S): at 11:12

## 2018-07-25 RX ADMIN — DORZOLAMIDE HYDROCHLORIDE TIMOLOL MALEATE 1 DROP(S): 20; 5 SOLUTION/ DROPS OPHTHALMIC at 16:50

## 2018-07-25 RX ADMIN — PANTOPRAZOLE SODIUM 40 MILLIGRAM(S): 20 TABLET, DELAYED RELEASE ORAL at 05:14

## 2018-07-25 RX ADMIN — Medication 30 MILLILITER(S): at 05:14

## 2018-07-25 RX ADMIN — LISINOPRIL 10 MILLIGRAM(S): 2.5 TABLET ORAL at 06:47

## 2018-07-25 RX ADMIN — DORZOLAMIDE HYDROCHLORIDE TIMOLOL MALEATE 1 DROP(S): 20; 5 SOLUTION/ DROPS OPHTHALMIC at 05:14

## 2018-07-25 RX ADMIN — GABAPENTIN 300 MILLIGRAM(S): 400 CAPSULE ORAL at 21:21

## 2018-07-25 RX ADMIN — Medication 6 UNIT(S): at 16:49

## 2018-07-25 RX ADMIN — Medication 8: at 11:30

## 2018-07-25 RX ADMIN — Medication 100 MILLIGRAM(S): at 21:51

## 2018-07-25 RX ADMIN — Medication 1 DROP(S): at 05:14

## 2018-07-25 RX ADMIN — ATORVASTATIN CALCIUM 20 MILLIGRAM(S): 80 TABLET, FILM COATED ORAL at 21:19

## 2018-07-25 RX ADMIN — Medication 30 MILLILITER(S): at 11:12

## 2018-07-25 RX ADMIN — Medication 6: at 16:49

## 2018-07-25 RX ADMIN — INSULIN GLARGINE 24 UNIT(S): 100 INJECTION, SOLUTION SUBCUTANEOUS at 21:19

## 2018-07-25 NOTE — PROGRESS NOTE ADULT - ASSESSMENT
59 y/o female with a PMHx of HTN, HLD, DM, DJD, kidney stones, GERD, glaucoma, visually/legally blind admitted on 7/22 for evaluation of right flank pain; upon admission noted to have mild hydronephrosis and has undergone right ureteral stent placement. No other specific complaints.  1. Patient admitted with nephrolithiasis and obstructing stone; s/p ureteral stent placement; also noted with leukocytosis most likely reactive to infection  - follow up cultures --E coli in urine  - iv hydration and supportive care   - serial cbc and monitor temperature   - reviewed prior medical records to evaluate for resistant or atypical pathogens   - urology eval in progress  - day #2 cefepime  - tolerating antibiotics without rashes or side effects   - Monitor closely in view of history of penicillin allergy   2. other issues: HTN, HLD, DM, DJD, kidney stones, GERD, glaucoma, visually/legally blind  - per medicine

## 2018-07-25 NOTE — PROGRESS NOTE ADULT - ASSESSMENT
58F.  admitted 07/22/18.  presented to ED c/o right flank and RLQ pain.  onset evening before admission.  in ED, evaluated by Dr. Merritt and taken to the OR for a stent placement for 11mm ureteral stone.     PMHx:  syncope-ILR;  left leg DVT (2014)-IVCF;  HTN;  DM;  HLD;  constipation;  GERD;  DJD;  vitamin D deficiency;  glaucoma-blind.      ureterolithiasis.  -CT AB/pelvis 11mm ureter stone w/ mild HN + pyelonephritis.  -07/22 cystoscopy and stent placement.  -UCx, 100K E. coli.  f/u sensitivities.  -cefepime 1g IV q12.  -Flomax.  -pain management.  -Urology.  -ID.    DM.  -FSBG trend reviewed, have not been below 250mg/dL since admission.  target 140-180mg/dL.  -07/23 A1C 9.2%.  -increase Lantus 24units sq qAM + qhs.  -increase premeal lispro 6units sq qac.  -c/w correction insulin coverage.    DVT prophylaxis.  -SCD.    disposition.  -5S.    communication.  -RN.

## 2018-07-26 ENCOUNTER — TRANSCRIPTION ENCOUNTER (OUTPATIENT)
Age: 58
End: 2018-07-26

## 2018-07-26 VITALS
TEMPERATURE: 98 F | RESPIRATION RATE: 16 BRPM | HEART RATE: 74 BPM | OXYGEN SATURATION: 94 % | DIASTOLIC BLOOD PRESSURE: 54 MMHG | SYSTOLIC BLOOD PRESSURE: 101 MMHG

## 2018-07-26 LAB
ANION GAP SERPL CALC-SCNC: 8 MMOL/L — SIGNIFICANT CHANGE UP (ref 5–17)
APPEARANCE UR: CLEAR — SIGNIFICANT CHANGE UP
BACTERIA # UR AUTO: ABNORMAL
BILIRUB UR-MCNC: NEGATIVE — SIGNIFICANT CHANGE UP
BUN SERPL-MCNC: 12 MG/DL — SIGNIFICANT CHANGE UP (ref 7–23)
CALCIUM SERPL-MCNC: 8.6 MG/DL — SIGNIFICANT CHANGE UP (ref 8.5–10.1)
CHLORIDE SERPL-SCNC: 103 MMOL/L — SIGNIFICANT CHANGE UP (ref 96–108)
CO2 SERPL-SCNC: 30 MMOL/L — SIGNIFICANT CHANGE UP (ref 22–31)
COLOR SPEC: YELLOW — SIGNIFICANT CHANGE UP
CREAT SERPL-MCNC: 0.74 MG/DL — SIGNIFICANT CHANGE UP (ref 0.5–1.3)
DIFF PNL FLD: ABNORMAL
EPI CELLS # UR: SIGNIFICANT CHANGE UP
GLUCOSE BLDC GLUCOMTR-MCNC: 138 MG/DL — HIGH (ref 70–99)
GLUCOSE BLDC GLUCOMTR-MCNC: 237 MG/DL — HIGH (ref 70–99)
GLUCOSE BLDC GLUCOMTR-MCNC: 312 MG/DL — HIGH (ref 70–99)
GLUCOSE SERPL-MCNC: 256 MG/DL — HIGH (ref 70–99)
GLUCOSE UR QL: 250 MG/DL
HCT VFR BLD CALC: 35.8 % — SIGNIFICANT CHANGE UP (ref 34.5–45)
HGB BLD-MCNC: 11.9 G/DL — SIGNIFICANT CHANGE UP (ref 11.5–15.5)
KETONES UR-MCNC: NEGATIVE — SIGNIFICANT CHANGE UP
LEUKOCYTE ESTERASE UR-ACNC: ABNORMAL
MCHC RBC-ENTMCNC: 29 PG — SIGNIFICANT CHANGE UP (ref 27–34)
MCHC RBC-ENTMCNC: 33.2 GM/DL — SIGNIFICANT CHANGE UP (ref 32–36)
MCV RBC AUTO: 87.3 FL — SIGNIFICANT CHANGE UP (ref 80–100)
NITRITE UR-MCNC: NEGATIVE — SIGNIFICANT CHANGE UP
NRBC # BLD: 0 /100 WBCS — SIGNIFICANT CHANGE UP (ref 0–0)
PH UR: 8 — SIGNIFICANT CHANGE UP (ref 5–8)
PLATELET # BLD AUTO: 277 K/UL — SIGNIFICANT CHANGE UP (ref 150–400)
POTASSIUM SERPL-MCNC: 3.8 MMOL/L — SIGNIFICANT CHANGE UP (ref 3.5–5.3)
POTASSIUM SERPL-SCNC: 3.8 MMOL/L — SIGNIFICANT CHANGE UP (ref 3.5–5.3)
PROT UR-MCNC: NEGATIVE MG/DL — SIGNIFICANT CHANGE UP
RBC # BLD: 4.1 M/UL — SIGNIFICANT CHANGE UP (ref 3.8–5.2)
RBC # FLD: 13 % — SIGNIFICANT CHANGE UP (ref 10.3–14.5)
RBC CASTS # UR COMP ASSIST: ABNORMAL /HPF (ref 0–4)
SODIUM SERPL-SCNC: 141 MMOL/L — SIGNIFICANT CHANGE UP (ref 135–145)
SP GR SPEC: 1.01 — SIGNIFICANT CHANGE UP (ref 1.01–1.02)
UROBILINOGEN FLD QL: NEGATIVE MG/DL — SIGNIFICANT CHANGE UP
WBC # BLD: 9.52 K/UL — SIGNIFICANT CHANGE UP (ref 3.8–10.5)
WBC # FLD AUTO: 9.52 K/UL — SIGNIFICANT CHANGE UP (ref 3.8–10.5)
WBC UR QL: ABNORMAL

## 2018-07-26 RX ORDER — CITRIC ACID/SODIUM CITRATE 300-500 MG
30 SOLUTION, ORAL ORAL
Qty: 1680 | Refills: 0
Start: 2018-07-26 | End: 2018-08-08

## 2018-07-26 RX ORDER — INSULIN LISPRO 100 [IU]/ML
40 INJECTION, SUSPENSION SUBCUTANEOUS
Qty: 0 | Refills: 0 | COMMUNITY

## 2018-07-26 RX ORDER — INSULIN LISPRO 100/ML
6 VIAL (ML) SUBCUTANEOUS
Qty: 1 | Refills: 0
Start: 2018-07-26

## 2018-07-26 RX ORDER — CEFUROXIME AXETIL 250 MG
1 TABLET ORAL
Qty: 22 | Refills: 0
Start: 2018-07-26 | End: 2018-08-05

## 2018-07-26 RX ORDER — ACETAMINOPHEN 500 MG
1000 TABLET ORAL ONCE
Qty: 0 | Refills: 0 | Status: COMPLETED | OUTPATIENT
Start: 2018-07-26 | End: 2018-07-26

## 2018-07-26 RX ORDER — ALLOPURINOL 300 MG
1 TABLET ORAL
Qty: 14 | Refills: 0
Start: 2018-07-26 | End: 2018-08-08

## 2018-07-26 RX ORDER — INSULIN GLARGINE 100 [IU]/ML
26 INJECTION, SOLUTION SUBCUTANEOUS
Qty: 1 | Refills: 0
Start: 2018-07-26

## 2018-07-26 RX ORDER — CEFUROXIME AXETIL 250 MG
500 TABLET ORAL EVERY 12 HOURS
Qty: 0 | Refills: 0 | Status: DISCONTINUED | OUTPATIENT
Start: 2018-07-26 | End: 2018-07-26

## 2018-07-26 RX ORDER — INSULIN LISPRO 100 [IU]/ML
36 INJECTION, SUSPENSION SUBCUTANEOUS
Qty: 0 | Refills: 0 | COMMUNITY

## 2018-07-26 RX ORDER — METFORMIN HYDROCHLORIDE 850 MG/1
1 TABLET ORAL
Qty: 0 | Refills: 0 | COMMUNITY

## 2018-07-26 RX ORDER — TAMSULOSIN HYDROCHLORIDE 0.4 MG/1
1 CAPSULE ORAL
Qty: 14 | Refills: 0
Start: 2018-07-26 | End: 2018-08-08

## 2018-07-26 RX ADMIN — INSULIN GLARGINE 24 UNIT(S): 100 INJECTION, SOLUTION SUBCUTANEOUS at 09:03

## 2018-07-26 RX ADMIN — Medication 6 UNIT(S): at 12:11

## 2018-07-26 RX ADMIN — PANTOPRAZOLE SODIUM 40 MILLIGRAM(S): 20 TABLET, DELAYED RELEASE ORAL at 05:45

## 2018-07-26 RX ADMIN — LISINOPRIL 10 MILLIGRAM(S): 2.5 TABLET ORAL at 05:45

## 2018-07-26 RX ADMIN — Medication 30 MILLILITER(S): at 00:09

## 2018-07-26 RX ADMIN — Medication 4: at 09:03

## 2018-07-26 RX ADMIN — CYCLOBENZAPRINE HYDROCHLORIDE 5 MILLIGRAM(S): 10 TABLET, FILM COATED ORAL at 06:13

## 2018-07-26 RX ADMIN — Medication 1 DROP(S): at 12:15

## 2018-07-26 RX ADMIN — DORZOLAMIDE HYDROCHLORIDE TIMOLOL MALEATE 1 DROP(S): 20; 5 SOLUTION/ DROPS OPHTHALMIC at 05:45

## 2018-07-26 RX ADMIN — Medication 30 MILLILITER(S): at 12:13

## 2018-07-26 RX ADMIN — Medication 100 MILLIGRAM(S): at 12:13

## 2018-07-26 RX ADMIN — Medication 30 MILLILITER(S): at 05:45

## 2018-07-26 RX ADMIN — Medication 1 DROP(S): at 05:45

## 2018-07-26 RX ADMIN — Medication 1 DROP(S): at 05:46

## 2018-07-26 RX ADMIN — Medication 400 MILLIGRAM(S): at 06:40

## 2018-07-26 RX ADMIN — CEFEPIME 1000 MILLIGRAM(S): 1 INJECTION, POWDER, FOR SOLUTION INTRAMUSCULAR; INTRAVENOUS at 05:46

## 2018-07-26 RX ADMIN — Medication 8: at 12:11

## 2018-07-26 RX ADMIN — Medication 6 UNIT(S): at 09:04

## 2018-07-26 NOTE — DISCHARGE NOTE ADULT - HOSPITAL COURSE
C:  Patient is a 58y old  Female who presents with a chief complaint of I was having a lot of pain and I was feeling it in my back (22 Jul 2018 19:55)    SUBJECTIVE:  via .  remarks her pain is better.  previously stated, her FSBG at home has been "well controlled" (A1C 9.2%???).   PMD at the [facility] manages her DM.  offers no new complaints at current time.    ROS:  all other review of systems are negative unless indicated above.    Vital Signs Last 24 Hrs  T(C): 36.4 (26 Jul 2018 11:25), Max: 36.9 (25 Jul 2018 20:15)  T(F): 97.6 (26 Jul 2018 11:25), Max: 98.5 (25 Jul 2018 20:15)  HR: 74 (26 Jul 2018 11:25) (74 - 86)  BP: 101/54 (26 Jul 2018 11:25) (101/54 - 125/61)  BP(mean): --  RR: 16 (26 Jul 2018 11:25) (16 - 18)  SpO2: 94% (26 Jul 2018 11:25) (94% - 96%)    Constitutional: NAD, awake and alert, well-developed with good responses to questions, mentally intact.   HEENT: PERRL, EOMI, MMM.  Neck: Soft and supple, No carotid bruit, No JVD  Respiratory: Breath sounds are clear bilaterally, No wheezing, rales or rhonchi  Cardiovascular: S1 and S2, regular rate and rhythm, no murmur, rub or gallop.  Gastrointestinal: Bowel Sounds present, soft, nontender, nondistended, no guarding, no rebound, no mass.  Extremities: No peripheral edema  Vascular: 2+ peripheral pulses  Neurological: A/O x 3, no focal deficits  Musculoskeletal: 5/5 strength b/l upper and lower extremities  Skin:  no visible rashes.                           11.9   9.52  )-----------( 277      ( 26 Jul 2018 07:16 )             35.8     07-26    141  |  103  |  12  ----------------------------<  256<H>  3.8   |  30  |  0.74    Ca    8.6      26 Jul 2018 07:16    58F.  admitted 07/22/18.  presented to ED c/o right flank and RLQ pain.  onset evening before admission.  in ED, evaluated by Dr. Merritt and taken to the OR for a stent placement for 11mm ureteral stone.     PMHx:  syncope-ILR;  left leg DVT (2014)-IVCF;  HTN;  DM;  HLD;  constipation;  GERD;  DJD;  vitamin D deficiency;  glaucoma-blind.      ureterolithiasis.  -CT AB/pelvis 11mm ureter stone w/ mild HN + pyelonephritis.  -07/22 cystoscopy and stent placement.  -UCx, 100K E. coli.   -cefuroxome 500mg po q12 x 11 more days.  -Flomax.  -allopurinol + sodium citrate-citric acid.  -renal sonogram can be done in 10 days to see if the stones have responded to this at which time the stent can be removed in the office.   -Urology follow up outpatient.    DM.  -FSBG trend reviewed, have not been below 250mg/dL since admission.  target 140-180mg/dL.  -07/23 A1C 9.2%.  -increase Lantus 26units sq qAM + qhs.  -c/w premeal lispro 6units sq qac.  -PMD follow up outpatient, re:  optimization of DM.  must be seen within 1-4 days.    disposition.  -may discharge today.  -d/c > 35 minutes.    communication.  -RN.  -case management.

## 2018-07-26 NOTE — PROGRESS NOTE ADULT - SUBJECTIVE AND OBJECTIVE BOX
CC:  Patient is a 58y old  Female who presents with a chief complaint of I was having a lot of pain and I was feeling it in my back (22 Jul 2018 19:55)    SUBJECTIVE:  via .  remarks her pain has improved.  states her FSBG at home has been "well controlled" (A1C 9.2%???).   PMD at the [facility] manages her DM.  offers no new complaints at current time.    ROS:  all other review of systems are negative unless indicated above.    allopurinol 100 milliGRAM(s) Oral daily  artificial  tears Solution 1 Drop(s) Both EYES three times a day PRN  atorvastatin 20 milliGRAM(s) Oral at bedtime  cefepime  Injectable. 1000 milliGRAM(s) IV Push every 12 hours  citric acid/sodium citrate Solution 30 milliLiter(s) Oral four times a day  cyclobenzaprine 5 milliGRAM(s) Oral daily PRN  dextrose 40% Gel 15 Gram(s) Oral once PRN  dextrose 5%. 1000 milliLiter(s) IV Continuous <Continuous>  dextrose 50% Injectable 12.5 Gram(s) IV Push once  dextrose 50% Injectable 25 Gram(s) IV Push once  dextrose 50% Injectable 25 Gram(s) IV Push once  docusate sodium 100 milliGRAM(s) Oral two times a day PRN  dorzolamide 2%/timolol 0.5% Ophthalmic Solution 1 Drop(s) Right EYE two times a day  gabapentin 300 milliGRAM(s) Oral three times a day PRN  glucagon  Injectable 1 milliGRAM(s) IntraMuscular once PRN  insulin glargine Injectable (LANTUS) 24 Unit(s) SubCutaneous two times a day  insulin lispro (HumaLOG) corrective regimen sliding scale   SubCutaneous three times a day before meals  insulin lispro (HumaLOG) corrective regimen sliding scale   SubCutaneous at bedtime  insulin lispro Injectable (HumaLOG) 6 Unit(s) SubCutaneous three times a day before meals  ketorolac   Injectable 10 milliGRAM(s) IV Push every 6 hours PRN  ketorolac 0.5% Ophthalmic Solution 1 Drop(s) Left EYE daily  lisinopril 10 milliGRAM(s) Oral daily  ofloxacin 0.3% Solution 1 Drop(s) Left EYE three times a day  ondansetron Injectable 4 milliGRAM(s) IV Push every 6 hours PRN  pantoprazole    Tablet 40 milliGRAM(s) Oral two times a day  prednisoLONE acetate 1% Suspension 1 Drop(s) Right EYE two times a day  tamsulosin 0.4 milliGRAM(s) Oral at bedtime    T(C): 36.3 (07-25-18 @ 11:19), Max: 36.9 (07-24-18 @ 20:35)  HR: 85 (07-25-18 @ 11:19) (75 - 85)  BP: 132/55 (07-25-18 @ 11:19) (106/57 - 132/55)  RR: 17 (07-25-18 @ 11:19) (16 - 17)  SpO2: 87% (07-25-18 @ 11:19) (87% - 97%)    Constitutional: NAD, awake and alert, well-developed with good responses to questions, mentally intact.   HEENT: PERRL, EOMI, MMM.  Neck: Soft and supple, No carotid bruit, No JVD  Respiratory: Breath sounds are clear bilaterally, No wheezing, rales or rhonchi  Cardiovascular: S1 and S2, regular rate and rhythm, no murmur, rub or gallop.  Gastrointestinal: Bowel Sounds present, soft, nontender, nondistended, no guarding, no rebound, no mass.  Extremities: No peripheral edema  Vascular: 2+ peripheral pulses  Neurological: A/O x 3, no focal deficits  Musculoskeletal: 5/5 strength b/l upper and lower extremities  Skin:  no visible rashes.                         12.4   11.32 )-----------( 273      ( 25 Jul 2018 07:18 )             37.1       07-25    139  |  103  |  17  ----------------------------<  288<H>  4.4   |  29  |  0.91    Ca    8.8      25 Jul 2018 07:18
CHIEF COMPLAINT:flank pain, stones    HISTORY OF PRESENT ILLNESS:CAT yesterday shows stone to be in lower pole of kidney, KUB does not show stone=stones probably of uric acid composition    PAST MEDICAL & SURGICAL HISTORY:  HLD (hyperlipidemia)  Vitamin D deficiency  Constipation  Syncope  DJD (degenerative joint disease)  Dvt femoral (deep venous thrombosis): left leg in   Hypertension  Type 2 diabetes mellitus  History of loop recorder  Shoulder disorder: left  Proliferative diabetic retinopathy of left eye: s/p PPV/laser/silicone  16 and 16 OS  [place] filter in place: left  H/O tubal ligation      REVIEW OF SYSTEMS:    CONSTITUTIONAL: No weakness, fevers or chills  EYES/ENT: No visual changes;  No vertigo or throat pain   NECK: No pain or stiffness  RESPIRATORY: No cough, wheezing, hemoptysis; No shortness of breath  CARDIOVASCULAR: No chest pain or palpitations  GASTROINTESTINAL: No abdominal or epigastric pain. No nausea, vomiting, or hematemesis; No diarrhea or constipation. No melena or hematochezia.  GENITOURINARY: No dysuria, frequency or hematuria  NEUROLOGICAL: No numbness or weakness  SKIN: No itching, burning, rashes, or lesions   All other review of systems is negative unless indicated above.    MEDICATIONS  (STANDING):  atorvastatin 20 milliGRAM(s) Oral at bedtime  cefepime  Injectable. 1000 milliGRAM(s) IV Push every 12 hours  dextrose 5%. 1000 milliLiter(s) (50 mL/Hr) IV Continuous <Continuous>  dextrose 50% Injectable 12.5 Gram(s) IV Push once  dextrose 50% Injectable 25 Gram(s) IV Push once  dextrose 50% Injectable 25 Gram(s) IV Push once  dorzolamide 2%/timolol 0.5% Ophthalmic Solution 1 Drop(s) Right EYE two times a day  insulin glargine Injectable (LANTUS) 20 Unit(s) SubCutaneous every morning  insulin glargine Injectable (LANTUS) 20 Unit(s) SubCutaneous at bedtime  insulin lispro (HumaLOG) corrective regimen sliding scale   SubCutaneous three times a day before meals  insulin lispro (HumaLOG) corrective regimen sliding scale   SubCutaneous at bedtime  insulin lispro Injectable (HumaLOG) 2 Unit(s) SubCutaneous three times a day before meals  ketorolac 0.5% Ophthalmic Solution 1 Drop(s) Left EYE daily  lisinopril 10 milliGRAM(s) Oral daily  ofloxacin 0.3% Solution 1 Drop(s) Left EYE three times a day  pantoprazole    Tablet 40 milliGRAM(s) Oral two times a day  prednisoLONE acetate 1% Suspension 1 Drop(s) Right EYE two times a day  tamsulosin 0.4 milliGRAM(s) Oral at bedtime    MEDICATIONS  (PRN):  artificial  tears Solution 1 Drop(s) Both EYES three times a day PRN Dry Eyes  cyclobenzaprine 5 milliGRAM(s) Oral daily PRN Muscle Spasm  dextrose 40% Gel 15 Gram(s) Oral once PRN Blood Glucose LESS THAN 70 milliGRAM(s)/deciliter  docusate sodium 100 milliGRAM(s) Oral two times a day PRN Constipation  gabapentin 300 milliGRAM(s) Oral three times a day PRN pain  glucagon  Injectable 1 milliGRAM(s) IntraMuscular once PRN Glucose LESS THAN 70 milligrams/deciliter  ketorolac   Injectable 10 milliGRAM(s) IV Push every 6 hours PRN Moderate Pain (4 - 6)  ondansetron Injectable 4 milliGRAM(s) IV Push every 6 hours PRN Nausea      Allergies    cumcumbers (Urticaria; Rash)  oxycodone (Urticaria; Rash)  penicillin (Rash)    Intolerances        SOCIAL HISTORY:    FAMILY HISTORY:  No pertinent family history in first degree relatives      Vital Signs Last 24 Hrs  T(C): 36.4 (2018 11:07), Max: 36.7 (2018 05:25)  T(F): 97.6 (2018 11:07), Max: 98 (2018 05:25)  HR: 84 (2018 11:07) (74 - 84)  BP: 110/64 (2018 11:07) (102/54 - 137/40)  BP(mean): --  RR: 16 (2018 11:07) (16 - 18)  SpO2: 96% (2018 11:07) (96% - 99%)    PHYSICAL EXAM:    Constitutional: NAD, well-developed  HEENT: MAYELIN, EOMI, Normal Hearing, MMM  Neck: No LAD, No JVD  Back: Normal spine flexure, No CVA tenderness  Respiratory: CTAB   Cardiovascular: S1 and S2, RRR, no M/G/R  Abd: BS+, soft, NT/ND, No CVAT  Extremities: No peripheral edema  Vascular: 2+ peripheral pulses  Neurological: A/O x 3, no focal deficits  Psychiatric: Normal mood, normal affect  Musculoskeletal: 5/5 strength b/l upper and lower extremities  Skin: No rashes    LABS:                        12.8   13.86 )-----------( 273      ( 2018 07:16 )             38.6     07-24    140  |  105  |  24<H>  ----------------------------<  351<H>  5.1   |  26  |  1.09    Ca    8.6      2018 07:16        Urinalysis Basic - ( 2018 17:20 )    Color: Yellow / Appearance: Clear / S.010 / pH: x  Gluc: x / Ketone: Negative  / Bili: Negative / Urobili: Negative mg/dL   Blood: x / Protein: 30 mg/dL / Nitrite: Positive   Leuk Esterase: Moderate / RBC: 6-10 /HPF / WBC 26-50   Sq Epi: x / Non Sq Epi: Moderate / Bacteria: Many      Urine Culture:     RADIOLOGY & ADDITIONAL STUDIES:
Patient is a 58y old  Female who presents with a chief complaint of I was having a lot of pain and I was feeling it in my back (22 Jul 2018 19:55)      Date of service: 07-25-18 @ 12:02    Patient still with right flank pain  Afebrile        ROS: no fever or chills; denies dizziness, no HA, no SOB or cough, no abdominal pain, no diarrhea or constipation; no dysuria, no urinary frequency, no legs pain, no rashes    MEDICATIONS  (STANDING):  allopurinol 100 milliGRAM(s) Oral daily  atorvastatin 20 milliGRAM(s) Oral at bedtime  cefepime  Injectable. 1000 milliGRAM(s) IV Push every 12 hours  citric acid/sodium citrate Solution 30 milliLiter(s) Oral four times a day  dextrose 5%. 1000 milliLiter(s) (50 mL/Hr) IV Continuous <Continuous>  dextrose 50% Injectable 12.5 Gram(s) IV Push once  dextrose 50% Injectable 25 Gram(s) IV Push once  dextrose 50% Injectable 25 Gram(s) IV Push once  dorzolamide 2%/timolol 0.5% Ophthalmic Solution 1 Drop(s) Right EYE two times a day  insulin glargine Injectable (LANTUS) 22 Unit(s) SubCutaneous at bedtime  insulin glargine Injectable (LANTUS) 22 Unit(s) SubCutaneous every morning  insulin lispro (HumaLOG) corrective regimen sliding scale   SubCutaneous three times a day before meals  insulin lispro (HumaLOG) corrective regimen sliding scale   SubCutaneous at bedtime  insulin lispro Injectable (HumaLOG) 4 Unit(s) SubCutaneous three times a day before meals  ketorolac 0.5% Ophthalmic Solution 1 Drop(s) Left EYE daily  lisinopril 10 milliGRAM(s) Oral daily  ofloxacin 0.3% Solution 1 Drop(s) Left EYE three times a day  pantoprazole    Tablet 40 milliGRAM(s) Oral two times a day  prednisoLONE acetate 1% Suspension 1 Drop(s) Right EYE two times a day  tamsulosin 0.4 milliGRAM(s) Oral at bedtime    MEDICATIONS  (PRN):  artificial  tears Solution 1 Drop(s) Both EYES three times a day PRN Dry Eyes  cyclobenzaprine 5 milliGRAM(s) Oral daily PRN Muscle Spasm  dextrose 40% Gel 15 Gram(s) Oral once PRN Blood Glucose LESS THAN 70 milliGRAM(s)/deciliter  docusate sodium 100 milliGRAM(s) Oral two times a day PRN Constipation  gabapentin 300 milliGRAM(s) Oral three times a day PRN pain  glucagon  Injectable 1 milliGRAM(s) IntraMuscular once PRN Glucose LESS THAN 70 milligrams/deciliter  ketorolac   Injectable 10 milliGRAM(s) IV Push every 6 hours PRN Moderate Pain (4 - 6)  ondansetron Injectable 4 milliGRAM(s) IV Push every 6 hours PRN Nausea      Vital Signs Last 24 Hrs  T(C): 36.3 (25 Jul 2018 11:19), Max: 36.9 (24 Jul 2018 20:35)  T(F): 97.3 (25 Jul 2018 11:19), Max: 98.5 (24 Jul 2018 20:35)  HR: 85 (25 Jul 2018 11:19) (75 - 85)  BP: 132/55 (25 Jul 2018 11:19) (106/57 - 132/55)  BP(mean): --  RR: 17 (25 Jul 2018 11:19) (16 - 17)  SpO2: 87% (25 Jul 2018 11:19) (87% - 97%)    Physical Exam:          Constitutional: frail looking  HEENT: NC/AT, ears and nose atraumatic; pharynx clear  Neck: supple; thyroid not palpable  Back: right flank tenderness  Respiratory: respiratory effort normal; clear to auscultation  Cardiovascular: S1S2 regular, no murmurs  Abdomen: soft, not tender, not distended, positive BS; no liver or spleen organomegaly  Genitourinary: no suprapubic tenderness  Musculoskeletal: no muscle tenderness, no joint swelling or tenderness  Neurological/ Psychiatric: AxOx3, judgement and insight normal;  moving all extremities  Skin: no rashes; no palpable lesions    Labs: all available labs reviewed               Labs:                        12.4   11.32 )-----------( 273      ( 25 Jul 2018 07:18 )             37.1     07-25    139  |  103  |  17  ----------------------------<  288<H>  4.4   |  29  |  0.91    Ca    8.8      25 Jul 2018 07:18             Cultures:       Culture - Urine (collected 07-23-18 @ 19:20)  Source: .Urine Clean Catch (Midstream)  Preliminary Report (07-24-18 @ 22:32):    >100,000 CFU/ml Escherichia coli            < from: CT Abdomen No Cont (07.23.18 @ 15:02) >    EXAM:  CT ABDOMEN ONLY                            PROCEDURE DATE:  07/23/2018          INTERPRETATION:  Clinical information: Ureteral stent placement. Localize   location of stone.    Technique: CT of the abdomen only was performed without the useof   intravenous or oral contrast.    COMPARISON: July 22, 2018.    FINDINGS: A right ureteral stent is in place, proximal tip in the right   renal pelvis. There has been resolution of right hydronephrosis.    Two right upper pole renal stones are again noted, appearing   approximately 3 mm. There is also a lower pole stone measuring 8 mm. This   appears new and may represent the stone previously seen in the proximal   ureter. There are no left renal stones.    No other significant changes are identified.    IMPRESSION:     Faintly radiodense 8mm calculus in the lower pole of the right kidney   likely representing the stone previously visualized in the proximal right   ureter.    Additional small stones in the upper pole of the right kidney.    < end of copied text >        Radiology: all available radiological tests reviewed    Advanced directives addressed: full resuscitation
CC:  Patient is a 58y old  Female who presents with a chief complaint of I was having a lot of pain and I was feeling it in my back (22 Jul 2018 19:55)    SUBJECTIVE:  offers no new complaints at current time.    ROS:  all other review of systems are negative unless indicated above.    artificial  tears Solution 1 Drop(s) Both EYES three times a day PRN  atorvastatin 20 milliGRAM(s) Oral at bedtime  cyclobenzaprine 5 milliGRAM(s) Oral daily PRN  dextrose 40% Gel 15 Gram(s) Oral once PRN  dextrose 5%. 1000 milliLiter(s) IV Continuous <Continuous>  dextrose 50% Injectable 12.5 Gram(s) IV Push once  dextrose 50% Injectable 25 Gram(s) IV Push once  dextrose 50% Injectable 25 Gram(s) IV Push once  docusate sodium 100 milliGRAM(s) Oral two times a day PRN  dorzolamide 2%/timolol 0.5% Ophthalmic Solution 1 Drop(s) Right EYE two times a day  gabapentin 300 milliGRAM(s) Oral three times a day PRN  glucagon  Injectable 1 milliGRAM(s) IntraMuscular once PRN  insulin glargine Injectable (LANTUS) 20 Unit(s) SubCutaneous every morning  insulin glargine Injectable (LANTUS) 15 Unit(s) SubCutaneous at bedtime  insulin lispro (HumaLOG) corrective regimen sliding scale   SubCutaneous three times a day before meals  insulin lispro (HumaLOG) corrective regimen sliding scale   SubCutaneous at bedtime  ketorolac 0.5% Ophthalmic Solution 1 Drop(s) Left EYE daily  lisinopril 10 milliGRAM(s) Oral daily  ofloxacin 0.3% Solution 1 Drop(s) Left EYE three times a day  ondansetron Injectable 4 milliGRAM(s) IV Push every 6 hours PRN  pantoprazole    Tablet 40 milliGRAM(s) Oral two times a day  prednisoLONE acetate 1% Suspension 1 Drop(s) Right EYE two times a day  tamsulosin 0.4 milliGRAM(s) Oral at bedtime    T(C): 36.9 (07-23-18 @ 11:54), Max: 36.9 (07-23-18 @ 11:54)  HR: 90 (07-23-18 @ 11:54) (84 - 99)  BP: 118/58 (07-23-18 @ 11:54) (109/54 - 165/80)  RR: 18 (07-23-18 @ 11:54) (14 - 18)  SpO2: 96% (07-23-18 @ 11:54) (96% - 100%)    Constitutional: NAD, awake and alert, well-developed with good responses to questions, mentally intact.   HEENT: PERRL, EOMI, MMM.  Neck: Soft and supple, No carotid bruit, No JVD  Respiratory: Breath sounds are clear bilaterally, No wheezing, rales or rhonchi  Cardiovascular: S1 and S2, regular rate and rhythm, no murmur, rub or gallop.  Gastrointestinal: Bowel Sounds present, soft, nontender, nondistended, no guarding, no rebound, no mass.  Extremities: No peripheral edema  Vascular: 2+ peripheral pulses  Neurological: A/O x 3, no focal deficits  Musculoskeletal: 5/5 strength b/l upper and lower extremities  Skin:  no visible rashes.                         12.6   14.36 )-----------( 266      ( 23 Jul 2018 09:49 )             37.1     PT/INR - ( 22 Jul 2018 10:09 )   PT: 10.2 sec;   INR: 0.95 ratio         PTT - ( 22 Jul 2018 10:09 )  PTT:27.1 sec  07-23    134<L>  |  102  |  18  ----------------------------<  389<H>  4.7   |  25  |  1.01    Ca    8.3<L>      23 Jul 2018 09:49    TPro  7.5  /  Alb  3.6  /  TBili  0.5  /  DBili  x   /  AST  40<H>  /  ALT  33  /  AlkPhos  128<H>  07-22
CC:  Patient is a 58y old  Female who presents with a chief complaint of I was having a lot of pain and I was feeling it in my back (22 Jul 2018 19:55)    SUBJECTIVE:  via .  + body aches and right flank pain.    ROS:  all other review of systems are negative unless indicated above.    allopurinol 100 milliGRAM(s) Oral daily  artificial  tears Solution 1 Drop(s) Both EYES three times a day PRN  atorvastatin 20 milliGRAM(s) Oral at bedtime  cefepime  Injectable. 1000 milliGRAM(s) IV Push every 12 hours  citric acid/sodium citrate Solution 30 milliLiter(s) Oral four times a day  cyclobenzaprine 5 milliGRAM(s) Oral daily PRN  dextrose 40% Gel 15 Gram(s) Oral once PRN  dextrose 5%. 1000 milliLiter(s) IV Continuous <Continuous>  dextrose 50% Injectable 12.5 Gram(s) IV Push once  dextrose 50% Injectable 25 Gram(s) IV Push once  dextrose 50% Injectable 25 Gram(s) IV Push once  docusate sodium 100 milliGRAM(s) Oral two times a day PRN  dorzolamide 2%/timolol 0.5% Ophthalmic Solution 1 Drop(s) Right EYE two times a day  gabapentin 300 milliGRAM(s) Oral three times a day PRN  glucagon  Injectable 1 milliGRAM(s) IntraMuscular once PRN  insulin glargine Injectable (LANTUS) 22 Unit(s) SubCutaneous at bedtime  insulin glargine Injectable (LANTUS) 22 Unit(s) SubCutaneous every morning  insulin lispro (HumaLOG) corrective regimen sliding scale   SubCutaneous three times a day before meals  insulin lispro (HumaLOG) corrective regimen sliding scale   SubCutaneous at bedtime  insulin lispro Injectable (HumaLOG) 4 Unit(s) SubCutaneous three times a day before meals  ketorolac   Injectable 10 milliGRAM(s) IV Push every 6 hours PRN  ketorolac 0.5% Ophthalmic Solution 1 Drop(s) Left EYE daily  lisinopril 10 milliGRAM(s) Oral daily  ofloxacin 0.3% Solution 1 Drop(s) Left EYE three times a day  ondansetron Injectable 4 milliGRAM(s) IV Push every 6 hours PRN  pantoprazole    Tablet 40 milliGRAM(s) Oral two times a day  prednisoLONE acetate 1% Suspension 1 Drop(s) Right EYE two times a day  tamsulosin 0.4 milliGRAM(s) Oral at bedtime    T(C): 36.4 (07-24-18 @ 11:07), Max: 36.7 (07-24-18 @ 05:25)  HR: 84 (07-24-18 @ 11:07) (74 - 84)  BP: 110/64 (07-24-18 @ 11:07) (102/54 - 137/40)  RR: 16 (07-24-18 @ 11:07) (16 - 18)  SpO2: 96% (07-24-18 @ 11:07) (96% - 99%)    Constitutional: NAD, awake and alert, well-developed with good responses to questions, mentally intact.   HEENT: PERRL, EOMI, MMM.  Neck: Soft and supple, No carotid bruit, No JVD  Respiratory: Breath sounds are clear bilaterally, No wheezing, rales or rhonchi  Cardiovascular: S1 and S2, regular rate and rhythm, no murmur, rub or gallop.  Gastrointestinal: Bowel Sounds present, soft, nontender, nondistended, no guarding, no rebound, no mass.  Extremities: No peripheral edema  Vascular: 2+ peripheral pulses  Neurological: A/O x 3, no focal deficits  Musculoskeletal: 5/5 strength b/l upper and lower extremities  Skin:  no visible rashes.                         12.8   13.86 )-----------( 273      ( 24 Jul 2018 07:16 )             38.6       07-24    140  |  105  |  24<H>  ----------------------------<  351<H>  5.1   |  26  |  1.09    Ca    8.6      24 Jul 2018 07:16
CHIEF COMPLAINT:Post ureteral stent right side    HISTORY OF PRESENT ILLNESS:Patient has some back pain right side, but is otherwise fine    PAST MEDICAL & SURGICAL HISTORY:  HLD (hyperlipidemia)  Vitamin D deficiency  Constipation  Syncope  DJD (degenerative joint disease)  Dvt femoral (deep venous thrombosis): left leg in 2014  Hypertension  Type 2 diabetes mellitus  History of loop recorder  Shoulder disorder: left  Proliferative diabetic retinopathy of left eye: s/p PPV/laser/silicone  5/31/16 and 11/29/16 OS  [place] filter in place: left  H/O tubal ligation      REVIEW OF SYSTEMS:    CONSTITUTIONAL: No weakness, fevers or chills  EYES/ENT: No visual changes;  No vertigo or throat pain   NECK: No pain or stiffness  RESPIRATORY: No cough, wheezing, hemoptysis; No shortness of breath  CARDIOVASCULAR: No chest pain or palpitations  GASTROINTESTINAL: No abdominal or epigastric pain. No nausea, vomiting, or hematemesis; No diarrhea or constipation. No melena or hematochezia.  GENITOURINARY: No dysuria, frequency or hematuria  NEUROLOGICAL: No numbness or weakness  SKIN: No itching, burning, rashes, or lesions   All other review of systems is negative unless indicated above.    MEDICATIONS  (STANDING):  atorvastatin 20 milliGRAM(s) Oral at bedtime  dextrose 5%. 1000 milliLiter(s) (50 mL/Hr) IV Continuous <Continuous>  dextrose 50% Injectable 12.5 Gram(s) IV Push once  dextrose 50% Injectable 25 Gram(s) IV Push once  dextrose 50% Injectable 25 Gram(s) IV Push once  dorzolamide 2%/timolol 0.5% Ophthalmic Solution 1 Drop(s) Right EYE two times a day  insulin glargine Injectable (LANTUS) 20 Unit(s) SubCutaneous every morning  insulin glargine Injectable (LANTUS) 15 Unit(s) SubCutaneous at bedtime  insulin lispro (HumaLOG) corrective regimen sliding scale   SubCutaneous three times a day before meals  insulin lispro (HumaLOG) corrective regimen sliding scale   SubCutaneous at bedtime  ketorolac 0.5% Ophthalmic Solution 1 Drop(s) Left EYE daily  lisinopril 10 milliGRAM(s) Oral daily  ofloxacin 0.3% Solution 1 Drop(s) Left EYE three times a day  pantoprazole    Tablet 40 milliGRAM(s) Oral two times a day  prednisoLONE acetate 1% Suspension 1 Drop(s) Right EYE two times a day  tamsulosin 0.4 milliGRAM(s) Oral at bedtime    MEDICATIONS  (PRN):  artificial  tears Solution 1 Drop(s) Both EYES three times a day PRN Dry Eyes  cyclobenzaprine 5 milliGRAM(s) Oral daily PRN Muscle Spasm  dextrose 40% Gel 15 Gram(s) Oral once PRN Blood Glucose LESS THAN 70 milliGRAM(s)/deciliter  docusate sodium 100 milliGRAM(s) Oral two times a day PRN Constipation  gabapentin 300 milliGRAM(s) Oral three times a day PRN pain  glucagon  Injectable 1 milliGRAM(s) IntraMuscular once PRN Glucose LESS THAN 70 milligrams/deciliter  ondansetron Injectable 4 milliGRAM(s) IV Push every 6 hours PRN Nausea      Allergies    cumcumbers (Urticaria; Rash)  oxycodone (Urticaria; Rash)  penicillin (Rash)    Intolerances        SOCIAL HISTORY:    FAMILY HISTORY:  No pertinent family history in first degree relatives      Vital Signs Last 24 Hrs  T(C): 36.8 (23 Jul 2018 05:12), Max: 36.8 (22 Jul 2018 18:10)  T(F): 98.2 (23 Jul 2018 05:12), Max: 98.3 (22 Jul 2018 18:10)  HR: 92 (23 Jul 2018 05:12) (84 - 99)  BP: 109/54 (23 Jul 2018 05:12) (109/54 - 165/80)  BP(mean): --  RR: 18 (23 Jul 2018 05:12) (14 - 18)  SpO2: 96% (23 Jul 2018 05:12) (96% - 100%)    PHYSICAL EXAM:    Constitutional: NAD, well-developed  HEENT: MAYELIN, EOMI, Normal Hearing, MMM  Neck: No LAD, No JVD  Back: Normal spine flexure, No CVA tenderness  Respiratory: CTAB   Cardiovascular: S1 and S2, RRR, no M/G/R  Abd: BS+, soft, NT/ND, No CVAT  : Normal phallus,open meatus,bilateral descended testes, no masses  JUDI: Normal prostate, no masses  Extremities: No peripheral edema  Vascular: 2+ peripheral pulses  Neurological: A/O x 3, no focal deficits  Psychiatric: Normal mood, normal affect  Musculoskeletal: 5/5 strength b/l upper and lower extremities  Skin: No rashes    LABS:                        12.6   14.36 )-----------( 266      ( 23 Jul 2018 09:49 )             37.1     07-23    134<L>  |  102  |  18  ----------------------------<  389<H>  4.7   |  25  |  1.01    Ca    8.3<L>      23 Jul 2018 09:49    TPro  7.5  /  Alb  3.6  /  TBili  0.5  /  DBili  x   /  AST  40<H>  /  ALT  33  /  AlkPhos  128<H>  07-22    PT/INR - ( 22 Jul 2018 10:09 )   PT: 10.2 sec;   INR: 0.95 ratio         PTT - ( 22 Jul 2018 10:09 )  PTT:27.1 sec    Urine Culture:     RADIOLOGY & ADDITIONAL STUDIES:
Patient is a 58y old  Female who presents with a chief complaint of I was having a lot of pain and I was feeling it in my back (22 Jul 2018 19:55)    Date of service: 07-26-18 @ 10:50    Date of service: 07-26-18 @ 10:50            ROS: no fever or chills; denies dizziness, no HA, no SOB or cough, no abdominal pain, no diarrhea or constipation; no dysuria, no urinary frequency, no legs pain, no rashes    MEDICATIONS  (STANDING):  allopurinol 100 milliGRAM(s) Oral daily  atorvastatin 20 milliGRAM(s) Oral at bedtime  cefepime  Injectable. 1000 milliGRAM(s) IV Push every 12 hours  citric acid/sodium citrate Solution 30 milliLiter(s) Oral four times a day  dextrose 5%. 1000 milliLiter(s) (50 mL/Hr) IV Continuous <Continuous>  dextrose 50% Injectable 12.5 Gram(s) IV Push once  dextrose 50% Injectable 25 Gram(s) IV Push once  dextrose 50% Injectable 25 Gram(s) IV Push once  dorzolamide 2%/timolol 0.5% Ophthalmic Solution 1 Drop(s) Right EYE two times a day  insulin glargine Injectable (LANTUS) 24 Unit(s) SubCutaneous two times a day  insulin lispro (HumaLOG) corrective regimen sliding scale   SubCutaneous three times a day before meals  insulin lispro (HumaLOG) corrective regimen sliding scale   SubCutaneous at bedtime  insulin lispro Injectable (HumaLOG) 6 Unit(s) SubCutaneous three times a day before meals  ketorolac 0.5% Ophthalmic Solution 1 Drop(s) Left EYE daily  lisinopril 10 milliGRAM(s) Oral daily  ofloxacin 0.3% Solution 1 Drop(s) Left EYE three times a day  pantoprazole    Tablet 40 milliGRAM(s) Oral two times a day  prednisoLONE acetate 1% Suspension 1 Drop(s) Right EYE two times a day  tamsulosin 0.4 milliGRAM(s) Oral at bedtime    MEDICATIONS  (PRN):  artificial  tears Solution 1 Drop(s) Both EYES three times a day PRN Dry Eyes  cyclobenzaprine 5 milliGRAM(s) Oral daily PRN Muscle Spasm  dextrose 40% Gel 15 Gram(s) Oral once PRN Blood Glucose LESS THAN 70 milliGRAM(s)/deciliter  docusate sodium 100 milliGRAM(s) Oral two times a day PRN Constipation  gabapentin 300 milliGRAM(s) Oral three times a day PRN pain  glucagon  Injectable 1 milliGRAM(s) IntraMuscular once PRN Glucose LESS THAN 70 milligrams/deciliter  ondansetron Injectable 4 milliGRAM(s) IV Push every 6 hours PRN Nausea      Vital Signs Last 24 Hrs  T(C): 36.7 (26 Jul 2018 05:40), Max: 36.9 (25 Jul 2018 20:15)  T(F): 98.1 (26 Jul 2018 05:40), Max: 98.5 (25 Jul 2018 20:15)  HR: 81 (26 Jul 2018 05:40) (81 - 86)  BP: 125/61 (26 Jul 2018 05:40) (121/55 - 132/55)  BP(mean): --  RR: 18 (26 Jul 2018 05:40) (17 - 18)  SpO2: 94% (26 Jul 2018 05:40) (87% - 96%)    Physical Exam:          Constitutional: frail looking  HEENT: NC/AT, ears and nose atraumatic; pharynx clear  Neck: supple; thyroid not palpable  Back: right flank tenderness  Respiratory: respiratory effort normal; clear to auscultation  Cardiovascular: S1S2 regular, no murmurs  Abdomen: soft, not tender, not distended, positive BS; no liver or spleen organomegaly  Genitourinary: no suprapubic tenderness  Musculoskeletal: no muscle tenderness, no joint swelling or tenderness  Neurological/ Psychiatric: AxOx3, judgement and insight normal;  moving all extremities  Skin: no rashes; no palpable lesions    Labs: all available labs reviewed               Labs:                     Labs:                        11.9   9.52  )-----------( 277      ( 26 Jul 2018 07:16 )             35.8     07-26    141  |  103  |  12  ----------------------------<  256<H>  3.8   |  30  |  0.74    Ca    8.6      26 Jul 2018 07:16             Cultures:       Culture - Urine (collected 07-23-18 @ 19:20)  Source: .Urine Clean Catch (Midstream)  Final Report (07-25-18 @ 19:01):    >100,000 CFU/ml Escherichia coli    Multiple Morphological Strains  Organism: Escherichia coli  Escherichia coli (07-25-18 @ 19:01)  Organism: Escherichia coli (07-25-18 @ 19:01)      -  Amikacin: S <=8      -  Amoxicillin/Clavulanic Acid: S <=8/4      -  Ampicillin: S <=2 These ampicillin results predict results for amoxicillin      -  Ampicillin/Sulbactam: S <=4/2      -  Aztreonam: S <=4      -  Cefazolin: S <=2 This predicts results for oral agents cefaclor, cefdinir, cefpodoxime, cefprozil, cefuroxime axetil, cephalexin and locarbef for uncomplicated UTI. Note that some isolates may be susceptible to these agents while testing resistant to cefazolin.      -  Cefepime: S <=2      -  Cefoxitin: S <=4      -  Ceftriaxone: S <=1 Enterobacter, Citrobacter, and Serratia may develop resistance during prolonged therapy      -  Ciprofloxacin: R >2      -  Ertapenem: S <=0.5      -  Gentamicin: S <=1      -  Imipenem: S <=1      -  Levofloxacin: R >4      -  Meropenem: S <=1      -  Nitrofurantoin: S <=32 Should not be used to treat pyelonephritis      -  Piperacillin/Tazobactam: S <=8      -  Tigecycline: S <=1      -  Tobramycin: S <=2      -  Trimethoprim/Sulfamethoxazole: R >2/38      Method Type: BURTON  Organism: Escherichia coli (07-25-18 @ 19:01)      -  Amikacin: S <=8      -  Amoxicillin/Clavulanic Acid: S <=8/4      -  Ampicillin: S <=2 These ampicillin results predict results for amoxicillin      -  Ampicillin/Sulbactam: S <=4/2      -  Aztreonam: S <=4      -  Cefazolin: S <=2 This predicts results for oral agents cefaclor, cefdinir, cefpodoxime, cefprozil, cefuroxime axetil, cephalexin and locarbef for uncomplicated UTI. Note that some isolates may be susceptible to these agents while testing resistant to cefazolin.      -  Cefepime: S <=2      -  Cefoxitin: S <=4      -  Ceftriaxone: S <=1 Enterobacter, Citrobacter, and Serratia may develop resistance during prolonged therapy      -  Ciprofloxacin: R >2      -  Ertapenem: S <=0.5      -  Gentamicin: S <=1      -  Imipenem: S <=1      -  Levofloxacin: R >4      -  Meropenem: S <=1      -  Nitrofurantoin: S <=32 Should not be used to treat pyelonephritis      -  Piperacillin/Tazobactam: S <=8      -  Tigecycline: S <=1      -  Tobramycin: S <=2      -  Trimethoprim/Sulfamethoxazole: S <=0.5/9.5      Method Type: BURTON          < from: CT Abdomen No Cont (07.23.18 @ 15:02) >    EXAM:  CT ABDOMEN ONLY                            PROCEDURE DATE:  07/23/2018          INTERPRETATION:  Clinical information: Ureteral stent placement. Localize   location of stone.    Technique: CT of the abdomen only was performed without the useof   intravenous or oral contrast.    COMPARISON: July 22, 2018.    FINDINGS: A right ureteral stent is in place, proximal tip in the right   renal pelvis. There has been resolution of right hydronephrosis.    Two right upper pole renal stones are again noted, appearing   approximately 3 mm. There is also a lower pole stone measuring 8 mm. This   appears new and may represent the stone previously seen in the proximal   ureter. There are no left renal stones.    No other significant changes are identified.    IMPRESSION:     Faintly radiodense 8mm calculus in the lower pole of the right kidney   likely representing the stone previously visualized in the proximal right   ureter.    Additional small stones in the upper pole of the right kidney.    < end of copied text >        Radiology: all available radiological tests reviewed    Advanced directives addressed: full resuscitation

## 2018-07-26 NOTE — PROGRESS NOTE ADULT - ASSESSMENT
57 y/o female with a PMHx of HTN, HLD, DM, DJD, kidney stones, GERD, glaucoma, visually/legally blind admitted on 7/22 for evaluation of right flank pain; upon admission noted to have mild hydronephrosis and has undergone right ureteral stent placement. No other specific complaints.  1. Patient admitted with nephrolithiasis and obstructing stone; s/p ureteral stent placement; also noted with leukocytosis most likely reactive to infection  - found to have E coli in urine  - iv hydration and supportive care   - serial cbc and monitor temperature   - reviewed prior medical records to evaluate for resistant or atypical pathogens   - urology eval in progress  - day #3 cefepime; will change to ceftin 500 mg po q 12 hours for 11 more days  - discharge planning  - tolerating antibiotics without rashes or side effects   - Monitor closely in view of history of penicillin allergy   2. other issues: HTN, HLD, DM, DJD, kidney stones, GERD, glaucoma, visually/legally blind  - per medicine  If further ID issues please reconsult

## 2018-07-26 NOTE — DISCHARGE NOTE ADULT - CARE PLAN
Principal Discharge DX:	Kidney stone  Goal:	see below.  Assessment and plan of treatment:	Urology follow up with Dr. Chito Merritt, renal sonogram can be done in 10 days to see if the stones have responded to this at which time the stent can be removed in the office.

## 2018-07-26 NOTE — DISCHARGE NOTE ADULT - CARE PROVIDER_API CALL
[facility],   [address]  [facility] [address]  Phone: [phone]  Fax: (   )    -

## 2018-07-26 NOTE — DISCHARGE NOTE ADULT - VISION (WITH CORRECTIVE LENSES IF THE PATIENT USUALLY WEARS THEM):
wears glasses- 1 pr with patient at hospital/Partially impaired: cannot see medication labels or newsprint, but can see obstacles in path, and the surrounding layout; can count fingers at arm's length

## 2018-07-26 NOTE — DISCHARGE NOTE ADULT - MEDICATION SUMMARY - MEDICATIONS TO TAKE
I will START or STAY ON the medications listed below when I get home from the hospital:    lisinopril 10 mg oral tablet  -- 1 tab(s) by mouth once a day  -- Indication: For hypertension.    tamsulosin 0.4 mg oral capsule  -- 1 cap(s) by mouth once a day (at bedtime)  -- Indication: For Kidney stone    gabapentin 300 mg oral tablet  -- 1 tab(s) by mouth 3 times a day, As Needed  -- Indication: For neuropathy    HumaLOG KwikPen 100 units/mL injectable solution  -- 6 unit(s) injectable 3 times a day (before meals).  hold if FSBG < 80mg/dL.  -- Indication: For diabetes    Lantus Solostar Pen 100 units/mL subcutaneous solution  -- 26 unit(s) subcutaneous every 12 hours.  hold if FSBG < 80mg/dL.  -- Do not drink alcoholic beverages when taking this medication.  It is very important that you take or use this exactly as directed.  Do not skip doses or discontinue unless directed by your doctor.  Keep in refrigerator.  Do not freeze.    -- Indication: For diabetes    allopurinol 100 mg oral tablet  -- 1 tab(s) by mouth once a day  -- Indication: For Kidney stone    Lipitor 20 mg oral tablet  -- 1 tab(s) by mouth once a day (at bedtime)  -- Indication: For hyperlipidemia    cefuroxime 500 mg oral tablet  -- 1 tab(s) by mouth every 12 hours  -- Indication: For Urinary tract infection    docusate potassium 100 mg oral capsule  -- 1 cap(s) by mouth 2 times a day  -- Indication: For constipation    sodium citrate-citric acid 500 mg-334 mg/5 mL oral solution  -- 30 milliliter(s) by mouth 4 times a day  -- Indication: For Kidney stone    cyclobenzaprine 5 mg oral tablet  -- 1 tab(s) by mouth once a day, As Needed  -- Indication: For muscle relaxent    Besivance 0.6% ophthalmic suspension  -- 1 drop(s) in the left eye 3 times a day  -- Indication: For eye drop    Systane ophthalmic solution  -- 1 drop(s) to each affected eye 2 times a day  -- Indication: For eye drop    dorzolamide-timolol 2%-0.5% preservative-free ophthalmic solution  -- 1 drop(s) in the right eye 2 times a day  -- Indication: For eye drop    Ilevro 0.3% ophthalmic suspension  -- 1 drop(s) in the left eye once a day  -- Indication: For eye drop    prednisoLONE acetate 1% ophthalmic suspension  -- 1 application in the right eye 2 times a day  -- Indication: For eye drop    omeprazole 20 mg oral delayed release capsule  -- 1 cap(s) by mouth 2 times a day  -- Indication: For GI prophylaxis.

## 2018-07-26 NOTE — DISCHARGE NOTE ADULT - MEDICATION SUMMARY - MEDICATIONS TO STOP TAKING
I will STOP taking the medications listed below when I get home from the hospital:    metFORMIN 500 mg oral tablet  -- 1 tab(s) by mouth 2 times a day    HumaLOG Mix 75/25 subcutaneous suspension  -- 40 unit(s) subcutaneous once a day    HumaLOG Mix 75/25 subcutaneous suspension  -- 36 unit(s) subcutaneous once a day (at bedtime)

## 2018-07-26 NOTE — DISCHARGE NOTE ADULT - PLAN OF CARE
see below. Urology follow up with Dr. Chito Merritt, renal sonogram can be done in 10 days to see if the stones have responded to this at which time the stent can be removed in the office.

## 2018-07-26 NOTE — DISCHARGE NOTE ADULT - PROVIDER TOKENS
FREE:[LAST:[[facility]],PHONE:[phone]],FAX:[(   [phone]    -],ADDRESS:[[facility] [place] [id number]]]

## 2018-07-27 LAB
CULTURE RESULTS: NO GROWTH — SIGNIFICANT CHANGE UP
SPECIMEN SOURCE: SIGNIFICANT CHANGE UP

## 2018-07-30 DIAGNOSIS — I10 ESSENTIAL (PRIMARY) HYPERTENSION: ICD-10-CM

## 2018-07-30 DIAGNOSIS — E87.5 HYPERKALEMIA: ICD-10-CM

## 2018-07-30 DIAGNOSIS — E78.5 HYPERLIPIDEMIA, UNSPECIFIED: ICD-10-CM

## 2018-07-30 DIAGNOSIS — K59.00 CONSTIPATION, UNSPECIFIED: ICD-10-CM

## 2018-07-30 DIAGNOSIS — E55.9 VITAMIN D DEFICIENCY, UNSPECIFIED: ICD-10-CM

## 2018-07-30 DIAGNOSIS — M19.90 UNSPECIFIED OSTEOARTHRITIS, UNSPECIFIED SITE: ICD-10-CM

## 2018-07-30 DIAGNOSIS — H54.8 LEGAL BLINDNESS, AS DEFINED IN USA: ICD-10-CM

## 2018-07-30 DIAGNOSIS — K21.9 GASTRO-ESOPHAGEAL REFLUX DISEASE WITHOUT ESOPHAGITIS: ICD-10-CM

## 2018-07-30 DIAGNOSIS — D72.829 ELEVATED WHITE BLOOD CELL COUNT, UNSPECIFIED: ICD-10-CM

## 2018-07-30 DIAGNOSIS — E11.9 TYPE 2 DIABETES MELLITUS WITHOUT COMPLICATIONS: ICD-10-CM

## 2018-07-30 DIAGNOSIS — N13.2 HYDRONEPHROSIS WITH RENAL AND URETERAL CALCULOUS OBSTRUCTION: ICD-10-CM

## 2018-07-30 DIAGNOSIS — Z86.718 PERSONAL HISTORY OF OTHER VENOUS THROMBOSIS AND EMBOLISM: ICD-10-CM

## 2018-07-30 DIAGNOSIS — B96.20 UNSPECIFIED ESCHERICHIA COLI [E. COLI] AS THE CAUSE OF DISEASES CLASSIFIED ELSEWHERE: ICD-10-CM

## 2018-07-30 DIAGNOSIS — Z88.0 ALLERGY STATUS TO PENICILLIN: ICD-10-CM

## 2018-07-30 DIAGNOSIS — H40.9 UNSPECIFIED GLAUCOMA: ICD-10-CM

## 2018-07-31 ENCOUNTER — FORM ENCOUNTER (OUTPATIENT)
Age: 58
End: 2018-07-31

## 2018-07-31 ENCOUNTER — APPOINTMENT (OUTPATIENT)
Dept: UROLOGY | Facility: CLINIC | Age: 58
End: 2018-07-31
Payer: MEDICAID

## 2018-07-31 VITALS
WEIGHT: 150 LBS | TEMPERATURE: 98.5 F | OXYGEN SATURATION: 96 % | DIASTOLIC BLOOD PRESSURE: 72 MMHG | BODY MASS INDEX: 28.32 KG/M2 | HEART RATE: 85 BPM | HEIGHT: 61 IN | SYSTOLIC BLOOD PRESSURE: 112 MMHG

## 2018-07-31 DIAGNOSIS — Z87.442 PERSONAL HISTORY OF URINARY CALCULI: ICD-10-CM

## 2018-07-31 PROCEDURE — 99213 OFFICE O/P EST LOW 20 MIN: CPT

## 2018-08-01 ENCOUNTER — OUTPATIENT (OUTPATIENT)
Dept: OUTPATIENT SERVICES | Facility: HOSPITAL | Age: 58
LOS: 1 days | End: 2018-08-01
Payer: MEDICAID

## 2018-08-01 ENCOUNTER — APPOINTMENT (OUTPATIENT)
Dept: ULTRASOUND IMAGING | Facility: CLINIC | Age: 58
End: 2018-08-01
Payer: MEDICAID

## 2018-08-01 DIAGNOSIS — Z00.8 ENCOUNTER FOR OTHER GENERAL EXAMINATION: ICD-10-CM

## 2018-08-01 DIAGNOSIS — E11.3592 TYPE 2 DIABETES MELLITUS WITH PROLIFERATIVE DIABETIC RETINOPATHY WITHOUT MACULAR EDEMA, LEFT EYE: Chronic | ICD-10-CM

## 2018-08-01 DIAGNOSIS — Z95.828 PRESENCE OF OTHER VASCULAR IMPLANTS AND GRAFTS: Chronic | ICD-10-CM

## 2018-08-01 DIAGNOSIS — M25.9 JOINT DISORDER, UNSPECIFIED: Chronic | ICD-10-CM

## 2018-08-01 DIAGNOSIS — Z98.51 TUBAL LIGATION STATUS: Chronic | ICD-10-CM

## 2018-08-01 DIAGNOSIS — Z98.890 OTHER SPECIFIED POSTPROCEDURAL STATES: Chronic | ICD-10-CM

## 2018-08-01 PROCEDURE — 76775 US EXAM ABDO BACK WALL LIM: CPT

## 2018-08-01 PROCEDURE — 76775 US EXAM ABDO BACK WALL LIM: CPT | Mod: 26

## 2018-08-15 ENCOUNTER — CHART COPY (OUTPATIENT)
Age: 58
End: 2018-08-15

## 2018-08-17 ENCOUNTER — LABORATORY RESULT (OUTPATIENT)
Age: 58
End: 2018-08-17

## 2018-08-17 ENCOUNTER — APPOINTMENT (OUTPATIENT)
Dept: UROLOGY | Facility: CLINIC | Age: 58
End: 2018-08-17
Payer: MEDICAID

## 2018-08-17 VITALS — HEART RATE: 111 BPM | DIASTOLIC BLOOD PRESSURE: 80 MMHG | SYSTOLIC BLOOD PRESSURE: 137 MMHG

## 2018-08-17 PROCEDURE — 99213 OFFICE O/P EST LOW 20 MIN: CPT | Mod: 25

## 2018-08-17 PROCEDURE — 52310 CYSTOSCOPY AND TREATMENT: CPT

## 2018-08-24 ENCOUNTER — APPOINTMENT (OUTPATIENT)
Dept: ELECTROPHYSIOLOGY | Facility: CLINIC | Age: 58
End: 2018-08-24

## 2018-08-26 ENCOUNTER — EMERGENCY (EMERGENCY)
Facility: HOSPITAL | Age: 58
LOS: 0 days | Discharge: HOME | End: 2018-08-26
Attending: EMERGENCY MEDICINE | Admitting: EMERGENCY MEDICINE
Payer: MEDICAID

## 2018-08-26 VITALS — WEIGHT: 154.1 LBS | HEIGHT: 65 IN

## 2018-08-26 VITALS
HEART RATE: 90 BPM | RESPIRATION RATE: 18 BRPM | SYSTOLIC BLOOD PRESSURE: 135 MMHG | DIASTOLIC BLOOD PRESSURE: 75 MMHG | TEMPERATURE: 98 F | OXYGEN SATURATION: 100 %
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
## 2018-08-26 DIAGNOSIS — S06.0X0A CONCUSSION WITHOUT LOSS OF CONSCIOUSNESS, INITIAL ENCOUNTER: ICD-10-CM
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
## 2018-08-26 DIAGNOSIS — Z88.0 ALLERGY STATUS TO PENICILLIN: ICD-10-CM

## 2018-08-26 DIAGNOSIS — Z88.5 ALLERGY STATUS TO NARCOTIC AGENT: ICD-10-CM

## 2018-08-26 DIAGNOSIS — S01.01XA LACERATION WITHOUT FOREIGN BODY OF SCALP, INITIAL ENCOUNTER: ICD-10-CM

## 2018-08-26 DIAGNOSIS — Z98.51 TUBAL LIGATION STATUS: Chronic | ICD-10-CM

## 2018-08-26 DIAGNOSIS — M25.9 JOINT DISORDER, UNSPECIFIED: Chronic | ICD-10-CM

## 2018-08-26 DIAGNOSIS — Z95.828 PRESENCE OF OTHER VASCULAR IMPLANTS AND GRAFTS: Chronic | ICD-10-CM

## 2018-08-26 DIAGNOSIS — Z91.018 ALLERGY TO OTHER FOODS: ICD-10-CM

## 2018-08-26 DIAGNOSIS — S09.90XA UNSPECIFIED INJURY OF HEAD, INITIAL ENCOUNTER: ICD-10-CM

## 2018-08-26 DIAGNOSIS — E11.3592 TYPE 2 DIABETES MELLITUS WITH PROLIFERATIVE DIABETIC RETINOPATHY WITHOUT MACULAR EDEMA, LEFT EYE: Chronic | ICD-10-CM

## 2018-08-26 DIAGNOSIS — Z98.890 OTHER SPECIFIED POSTPROCEDURAL STATES: Chronic | ICD-10-CM
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
## 2018-08-26 PROCEDURE — 70450 CT HEAD/BRAIN W/O DYE: CPT | Mod: 26

## 2018-08-26 PROCEDURE — 99285 EMERGENCY DEPT VISIT HI MDM: CPT

## 2018-08-26 PROCEDURE — 12001 RPR S/N/AX/GEN/TRNK 2.5CM/<: CPT

## 2018-08-26 RX ORDER — ACETAMINOPHEN 500 MG
650 TABLET ORAL ONCE
Qty: 0 | Refills: 0 | Status: COMPLETED | OUTPATIENT
Start: 2018-08-26 | End: 2018-08-26

## 2018-08-26 RX ADMIN — Medication 650 MILLIGRAM(S): at 13:35

## 2018-08-26 NOTE — ED STATDOCS - PROGRESS NOTE DETAILS
Pt. presents with head pain s/p fall.  Pt. fell sustaining 0.5 cm non gaping superficial laceration to right parietal scalp.  Pt. with headache.  No thinners.  Will provide CT/wound care.  Elsa Garcia PA-C Dermal glue to superficial scalp lac.  FU with neurosurgery .

## 2018-08-26 NOTE — ED ADULT NURSE NOTE - OBJECTIVE STATEMENT
Pt presents to ED with complaints of head pain. Patient was sitting in chair putting eye drops in and she lost balance and fell off the chair. Patient hit rt side of head. Patient states she has chronic dizziness from multiple medical issues.

## 2018-08-26 NOTE — ED ADULT NURSE NOTE - NSIMPLEMENTINTERV_GEN_ALL_ED
Implemented All Fall Risk Interventions:  [place] to call system. Call bell, personal items and telephone within reach. Instruct patient to call for assistance. Room bathroom lighting operational. Non-slip footwear when patient is off stretcher. Physically safe environment: no spills, clutter or unnecessary equipment. Stretcher in lowest position, wheels locked, appropriate side rails in place. Provide visual cue, wrist band, yellow gown, etc. Monitor gait and stability. Monitor for mental status changes and reorient to person, place, and time. Review medications for side effects contributing to fall risk. Reinforce activity limits and safety measures with patient and family.

## 2018-08-26 NOTE — ED ADULT TRIAGE NOTE - CHIEF COMPLAINT QUOTE
pt fell off of a chair and hit the back of her head on a corner of the table. Small lac noted. Pt denies taking anticoagulation

## 2018-08-26 NOTE — ED STATDOCS - ATTENDING CONTRIBUTION TO CARE
Attending Contribution to Care: I, Shahana Doshi, performed the initial face to face bedside interview with this patient regarding history of present illness, review of symptoms and relevant past medical, social and family history.  I completed an independent physical examination.  I was the initial provider who evaluated this patient and the history, physical, and MDM reflect this intial assessment. I have signed out the follow up of any pending tests after the original (i.e. labs, radiological studies) to the ACP with instructions to review any with instructions to review any concerning findings to me prior to discharge.  I have communicated the patient’s plan of care and disposition with the ACP.

## 2018-08-26 NOTE — ED ADULT NURSE NOTE - CHPI ED NUR SYMPTOMS NEG
no blurred vision/no loss of consciousness/no nausea/no confusion/no seizure/no change in level of consciousness

## 2018-08-26 NOTE — ED STATDOCS - OBJECTIVE STATEMENT
59 y/o F presents to the ED s/p fall sustained a laceration to the R mastoid area. Daughter notes patient went to grab her eyedrops from underneath the table, as she was going down she fell down and leaned to the R fell to the R side of body and sustained a laceration; LOC for a couple of seconds.  PT has been c/o dizziness and visual issues for the past couple of days. Daughter at bedside.

## 2018-08-27 ENCOUNTER — TRANSCRIPTION ENCOUNTER (OUTPATIENT)
Age: 58
End: 2018-08-27

## 2018-08-28 ENCOUNTER — OUTPATIENT (OUTPATIENT)
Dept: OUTPATIENT SERVICES | Facility: HOSPITAL | Age: 58
LOS: 1 days | End: 2018-08-28
Payer: MEDICAID

## 2018-08-28 VITALS
SYSTOLIC BLOOD PRESSURE: 117 MMHG | HEIGHT: 60 IN | WEIGHT: 149.47 LBS | OXYGEN SATURATION: 97 % | RESPIRATION RATE: 14 BRPM | HEART RATE: 81 BPM | TEMPERATURE: 98 F | DIASTOLIC BLOOD PRESSURE: 66 MMHG

## 2018-08-28 VITALS
RESPIRATION RATE: 18 BRPM | OXYGEN SATURATION: 95 % | HEART RATE: 95 BPM | SYSTOLIC BLOOD PRESSURE: 129 MMHG | DIASTOLIC BLOOD PRESSURE: 62 MMHG

## 2018-08-28 DIAGNOSIS — Z98.51 TUBAL LIGATION STATUS: Chronic | ICD-10-CM

## 2018-08-28 DIAGNOSIS — Z95.828 PRESENCE OF OTHER VASCULAR IMPLANTS AND GRAFTS: Chronic | ICD-10-CM

## 2018-08-28 DIAGNOSIS — Z98.890 OTHER SPECIFIED POSTPROCEDURAL STATES: Chronic | ICD-10-CM

## 2018-08-28 DIAGNOSIS — M25.9 JOINT DISORDER, UNSPECIFIED: Chronic | ICD-10-CM

## 2018-08-28 DIAGNOSIS — E11.3552 TYPE 2 DIABETES MELLITUS WITH STABLE PROLIFERATIVE DIABETIC RETINOPATHY, LEFT EYE: ICD-10-CM

## 2018-08-28 DIAGNOSIS — E11.3592 TYPE 2 DIABETES MELLITUS WITH PROLIFERATIVE DIABETIC RETINOPATHY WITHOUT MACULAR EDEMA, LEFT EYE: Chronic | ICD-10-CM

## 2018-08-28 PROCEDURE — 67113 REPAIR RETINAL DETACH CPLX: CPT | Mod: LT

## 2018-08-28 PROCEDURE — C1784: CPT

## 2018-08-28 PROCEDURE — 82962 GLUCOSE BLOOD TEST: CPT

## 2018-08-28 PROCEDURE — C1889: CPT

## 2018-08-28 PROCEDURE — C1814: CPT

## 2018-08-28 NOTE — ASU PATIENT PROFILE, ADULT - PSH
[place] filter in place  left  H/O tubal ligation    History of cystoscopy  history  nephrolithiasis and recurrent UTI S/P ESWL 5/2016  History of loop recorder    Proliferative diabetic retinopathy of left eye  s/p PPV/laser/silicone  5/31/16 and 11/29/16 OS  Shoulder disorder  left

## 2018-08-28 NOTE — ASU PATIENT PROFILE, ADULT - LANGUAGE ASSISTANCE NEEDED
Patient also gave permission, through [place]  to have her daughter translate for her as needed today./Yes-Patient/Caregiver accepts free interpretation services...

## 2018-09-19 ENCOUNTER — APPOINTMENT (OUTPATIENT)
Dept: ELECTROPHYSIOLOGY | Facility: CLINIC | Age: 58
End: 2018-09-19

## 2018-09-24 ENCOUNTER — APPOINTMENT (OUTPATIENT)
Dept: ELECTROPHYSIOLOGY | Facility: CLINIC | Age: 58
End: 2018-09-24
Payer: MEDICAID

## 2018-09-24 PROCEDURE — 93299: CPT

## 2018-09-24 PROCEDURE — 93298 REM INTERROG DEV EVAL SCRMS: CPT

## 2018-10-22 ENCOUNTER — APPOINTMENT (OUTPATIENT)
Dept: ELECTROPHYSIOLOGY | Facility: CLINIC | Age: 58
End: 2018-10-22
Payer: MEDICAID

## 2018-10-22 PROCEDURE — 93299: CPT

## 2018-10-22 PROCEDURE — 93298 REM INTERROG DEV EVAL SCRMS: CPT

## 2018-11-12 ENCOUNTER — APPOINTMENT (OUTPATIENT)
Dept: MAMMOGRAPHY | Facility: CLINIC | Age: 58
End: 2018-11-12

## 2018-11-13 ENCOUNTER — OUTPATIENT (OUTPATIENT)
Dept: OUTPATIENT SERVICES | Facility: HOSPITAL | Age: 58
LOS: 1 days | End: 2018-11-13
Payer: MEDICAID

## 2018-11-13 ENCOUNTER — APPOINTMENT (OUTPATIENT)
Dept: MAMMOGRAPHY | Facility: CLINIC | Age: 58
End: 2018-11-13
Payer: MEDICAID

## 2018-11-13 DIAGNOSIS — Z98.890 OTHER SPECIFIED POSTPROCEDURAL STATES: Chronic | ICD-10-CM

## 2018-11-13 DIAGNOSIS — E11.3592 TYPE 2 DIABETES MELLITUS WITH PROLIFERATIVE DIABETIC RETINOPATHY WITHOUT MACULAR EDEMA, LEFT EYE: Chronic | ICD-10-CM

## 2018-11-13 DIAGNOSIS — M25.9 JOINT DISORDER, UNSPECIFIED: Chronic | ICD-10-CM

## 2018-11-13 DIAGNOSIS — Z98.51 TUBAL LIGATION STATUS: Chronic | ICD-10-CM

## 2018-11-13 DIAGNOSIS — Z00.8 ENCOUNTER FOR OTHER GENERAL EXAMINATION: ICD-10-CM

## 2018-11-13 DIAGNOSIS — Z95.828 PRESENCE OF OTHER VASCULAR IMPLANTS AND GRAFTS: Chronic | ICD-10-CM

## 2018-11-13 PROCEDURE — 77067 SCR MAMMO BI INCL CAD: CPT

## 2018-11-13 PROCEDURE — 77063 BREAST TOMOSYNTHESIS BI: CPT

## 2018-11-13 PROCEDURE — 77067 SCR MAMMO BI INCL CAD: CPT | Mod: 26

## 2018-11-13 PROCEDURE — 77063 BREAST TOMOSYNTHESIS BI: CPT | Mod: 26

## 2018-11-23 ENCOUNTER — APPOINTMENT (OUTPATIENT)
Dept: ELECTROPHYSIOLOGY | Facility: CLINIC | Age: 58
End: 2018-11-23
Payer: MEDICAID

## 2018-11-23 PROCEDURE — 93299: CPT

## 2018-11-23 PROCEDURE — 93298 REM INTERROG DEV EVAL SCRMS: CPT

## 2018-12-24 ENCOUNTER — APPOINTMENT (OUTPATIENT)
Dept: ELECTROPHYSIOLOGY | Facility: CLINIC | Age: 58
End: 2018-12-24
Payer: MEDICAID

## 2018-12-24 PROCEDURE — 93298 REM INTERROG DEV EVAL SCRMS: CPT

## 2018-12-24 PROCEDURE — 93299: CPT

## 2019-01-17 ENCOUNTER — CLINICAL ADVICE (OUTPATIENT)
Age: 59
End: 2019-01-17

## 2019-01-25 ENCOUNTER — APPOINTMENT (OUTPATIENT)
Dept: ELECTROPHYSIOLOGY | Facility: CLINIC | Age: 59
End: 2019-01-25
Payer: MEDICAID

## 2019-01-25 PROCEDURE — 93299: CPT

## 2019-01-25 PROCEDURE — 93298 REM INTERROG DEV EVAL SCRMS: CPT

## 2019-01-28 ENCOUNTER — EMERGENCY (EMERGENCY)
Facility: HOSPITAL | Age: 59
LOS: 0 days | Discharge: HOME | End: 2019-01-28
Attending: EMERGENCY MEDICINE | Admitting: EMERGENCY MEDICINE
Payer: MEDICAID

## 2019-01-28 VITALS
RESPIRATION RATE: 18 BRPM | SYSTOLIC BLOOD PRESSURE: 129 MMHG | OXYGEN SATURATION: 96 % | TEMPERATURE: 98 F | HEART RATE: 88 BPM | DIASTOLIC BLOOD PRESSURE: 63 MMHG

## 2019-01-28 VITALS — WEIGHT: 149.91 LBS | HEIGHT: 60 IN

## 2019-01-28 DIAGNOSIS — E11.3592 TYPE 2 DIABETES MELLITUS WITH PROLIFERATIVE DIABETIC RETINOPATHY WITHOUT MACULAR EDEMA, LEFT EYE: Chronic | ICD-10-CM

## 2019-01-28 DIAGNOSIS — Z79.4 LONG TERM (CURRENT) USE OF INSULIN: ICD-10-CM

## 2019-01-28 DIAGNOSIS — R07.9 CHEST PAIN, UNSPECIFIED: ICD-10-CM

## 2019-01-28 DIAGNOSIS — Z95.828 PRESENCE OF OTHER VASCULAR IMPLANTS AND GRAFTS: Chronic | ICD-10-CM

## 2019-01-28 DIAGNOSIS — Z86.718 PERSONAL HISTORY OF OTHER VENOUS THROMBOSIS AND EMBOLISM: ICD-10-CM

## 2019-01-28 DIAGNOSIS — M25.9 JOINT DISORDER, UNSPECIFIED: Chronic | ICD-10-CM

## 2019-01-28 DIAGNOSIS — Z98.890 OTHER SPECIFIED POSTPROCEDURAL STATES: Chronic | ICD-10-CM

## 2019-01-28 DIAGNOSIS — Z98.51 TUBAL LIGATION STATUS: Chronic | ICD-10-CM

## 2019-01-28 DIAGNOSIS — E78.5 HYPERLIPIDEMIA, UNSPECIFIED: ICD-10-CM

## 2019-01-28 DIAGNOSIS — E11.9 TYPE 2 DIABETES MELLITUS WITHOUT COMPLICATIONS: ICD-10-CM

## 2019-01-28 DIAGNOSIS — M19.90 UNSPECIFIED OSTEOARTHRITIS, UNSPECIFIED SITE: ICD-10-CM

## 2019-01-28 DIAGNOSIS — I10 ESSENTIAL (PRIMARY) HYPERTENSION: ICD-10-CM

## 2019-01-28 LAB
ALBUMIN SERPL ELPH-MCNC: 3.5 G/DL — SIGNIFICANT CHANGE UP (ref 3.3–5)
ALP SERPL-CCNC: 131 U/L — HIGH (ref 40–120)
ALT FLD-CCNC: 30 U/L — SIGNIFICANT CHANGE UP (ref 12–78)
ANION GAP SERPL CALC-SCNC: 6 MMOL/L — SIGNIFICANT CHANGE UP (ref 5–17)
APTT BLD: 28.2 SEC — SIGNIFICANT CHANGE UP (ref 27.5–36.3)
AST SERPL-CCNC: 17 U/L — SIGNIFICANT CHANGE UP (ref 15–37)
BASOPHILS # BLD AUTO: 0.02 K/UL — SIGNIFICANT CHANGE UP (ref 0–0.2)
BASOPHILS NFR BLD AUTO: 0.1 % — SIGNIFICANT CHANGE UP (ref 0–2)
BILIRUB SERPL-MCNC: 0.4 MG/DL — SIGNIFICANT CHANGE UP (ref 0.2–1.2)
BUN SERPL-MCNC: 15 MG/DL — SIGNIFICANT CHANGE UP (ref 7–23)
CALCIUM SERPL-MCNC: 8.7 MG/DL — SIGNIFICANT CHANGE UP (ref 8.5–10.1)
CHLORIDE SERPL-SCNC: 107 MMOL/L — SIGNIFICANT CHANGE UP (ref 96–108)
CO2 SERPL-SCNC: 29 MMOL/L — SIGNIFICANT CHANGE UP (ref 22–31)
CREAT SERPL-MCNC: 0.9 MG/DL — SIGNIFICANT CHANGE UP (ref 0.5–1.3)
EOSINOPHIL # BLD AUTO: 0.33 K/UL — SIGNIFICANT CHANGE UP (ref 0–0.5)
EOSINOPHIL NFR BLD AUTO: 2.1 % — SIGNIFICANT CHANGE UP (ref 0–6)
GLUCOSE BLDC GLUCOMTR-MCNC: 218 MG/DL — HIGH (ref 70–99)
GLUCOSE SERPL-MCNC: 196 MG/DL — HIGH (ref 70–99)
HCT VFR BLD CALC: 40 % — SIGNIFICANT CHANGE UP (ref 34.5–45)
HGB BLD-MCNC: 13.3 G/DL — SIGNIFICANT CHANGE UP (ref 11.5–15.5)
IMM GRANULOCYTES NFR BLD AUTO: 0.3 % — SIGNIFICANT CHANGE UP (ref 0–1.5)
INR BLD: 0.9 RATIO — SIGNIFICANT CHANGE UP (ref 0.88–1.16)
LYMPHOCYTES # BLD AUTO: 12.9 % — LOW (ref 13–44)
LYMPHOCYTES # BLD AUTO: 2.05 K/UL — SIGNIFICANT CHANGE UP (ref 1–3.3)
MAGNESIUM SERPL-MCNC: 1.8 MG/DL — SIGNIFICANT CHANGE UP (ref 1.6–2.6)
MCHC RBC-ENTMCNC: 29.8 PG — SIGNIFICANT CHANGE UP (ref 27–34)
MCHC RBC-ENTMCNC: 33.3 GM/DL — SIGNIFICANT CHANGE UP (ref 32–36)
MCV RBC AUTO: 89.5 FL — SIGNIFICANT CHANGE UP (ref 80–100)
MONOCYTES # BLD AUTO: 0.61 K/UL — SIGNIFICANT CHANGE UP (ref 0–0.9)
MONOCYTES NFR BLD AUTO: 3.8 % — SIGNIFICANT CHANGE UP (ref 2–14)
NEUTROPHILS # BLD AUTO: 12.88 K/UL — HIGH (ref 1.8–7.4)
NEUTROPHILS NFR BLD AUTO: 80.8 % — HIGH (ref 43–77)
NRBC # BLD: 0 /100 WBCS — SIGNIFICANT CHANGE UP (ref 0–0)
PLATELET # BLD AUTO: 272 K/UL — SIGNIFICANT CHANGE UP (ref 150–400)
POTASSIUM SERPL-MCNC: 4.2 MMOL/L — SIGNIFICANT CHANGE UP (ref 3.5–5.3)
POTASSIUM SERPL-SCNC: 4.2 MMOL/L — SIGNIFICANT CHANGE UP (ref 3.5–5.3)
PROT SERPL-MCNC: 7.3 GM/DL — SIGNIFICANT CHANGE UP (ref 6–8.3)
PROTHROM AB SERPL-ACNC: 10 SEC — SIGNIFICANT CHANGE UP (ref 10–12.9)
RBC # BLD: 4.47 M/UL — SIGNIFICANT CHANGE UP (ref 3.8–5.2)
RBC # FLD: 13.1 % — SIGNIFICANT CHANGE UP (ref 10.3–14.5)
SODIUM SERPL-SCNC: 142 MMOL/L — SIGNIFICANT CHANGE UP (ref 135–145)
TROPONIN I SERPL-MCNC: <0.015 NG/ML — SIGNIFICANT CHANGE UP (ref 0.01–0.04)
TROPONIN I SERPL-MCNC: <0.015 NG/ML — SIGNIFICANT CHANGE UP (ref 0.01–0.04)
WBC # BLD: 15.94 K/UL — HIGH (ref 3.8–10.5)
WBC # FLD AUTO: 15.94 K/UL — HIGH (ref 3.8–10.5)

## 2019-01-28 PROCEDURE — 71045 X-RAY EXAM CHEST 1 VIEW: CPT | Mod: 26

## 2019-01-28 PROCEDURE — 93010 ELECTROCARDIOGRAM REPORT: CPT

## 2019-01-28 PROCEDURE — 99284 EMERGENCY DEPT VISIT MOD MDM: CPT

## 2019-01-28 RX ORDER — ONDANSETRON 8 MG/1
4 TABLET, FILM COATED ORAL ONCE
Qty: 0 | Refills: 0 | Status: COMPLETED | OUTPATIENT
Start: 2019-01-28 | End: 2019-01-28

## 2019-01-28 RX ORDER — SUCRALFATE 1 G
1 TABLET ORAL
Qty: 7 | Refills: 0
Start: 2019-01-28 | End: 2019-02-03

## 2019-01-28 RX ORDER — FAMOTIDINE 10 MG/ML
20 INJECTION INTRAVENOUS ONCE
Qty: 0 | Refills: 0 | Status: COMPLETED | OUTPATIENT
Start: 2019-01-28 | End: 2019-01-28

## 2019-01-28 RX ORDER — LIDOCAINE 4 G/100G
10 CREAM TOPICAL ONCE
Qty: 0 | Refills: 0 | Status: COMPLETED | OUTPATIENT
Start: 2019-01-28 | End: 2019-01-28

## 2019-01-28 RX ORDER — FAMOTIDINE 10 MG/ML
1 INJECTION INTRAVENOUS
Qty: 7 | Refills: 0
Start: 2019-01-28 | End: 2019-02-03

## 2019-01-28 RX ADMIN — ONDANSETRON 4 MILLIGRAM(S): 8 TABLET, FILM COATED ORAL at 15:39

## 2019-01-28 RX ADMIN — FAMOTIDINE 20 MILLIGRAM(S): 10 INJECTION INTRAVENOUS at 15:38

## 2019-01-28 RX ADMIN — LIDOCAINE 10 MILLILITER(S): 4 CREAM TOPICAL at 15:37

## 2019-01-28 RX ADMIN — Medication 30 MILLILITER(S): at 15:43

## 2019-01-28 NOTE — ED PROVIDER NOTE - NS_ ATTENDINGSCRIBEDETAILS _ED_A_ED_FT
I, Vlad Carr MD,  performed the initial face to face bedside interview with this patient regarding history of present illness, review of symptoms and relevant past medical, social and family history.  I completed an independent physical examination.    The history, relevant review of systems, past medical and surgical history, medical decision making, and physical examination was documented by the scribe in my presence and I attest to the accuracy of the documentation.

## 2019-01-28 NOTE — ED ADULT TRIAGE NOTE - CHIEF COMPLAINT QUOTE
Pt. to the ED C/O Chest pressure with Nausea and Vomiting x 3 days - Pt. states hx. of Loop Recorded, Diabetes, Cataracts and Multiple medical comorbidities.

## 2019-01-28 NOTE — ED ADULT NURSE REASSESSMENT NOTE - NS ED NURSE REASSESS COMMENT FT1
report received from previous RN. patient resting comfortably in stretcher in no acute distress with daughter at bedside. denies any pain at this time. states she is feeling better. awaiting lab results and pending dispo. VSS. color good. skin warm and dry. respirations even and unlabored.

## 2019-01-28 NOTE — ED STATDOCS - OBJECTIVE STATEMENT
57 y/o female with a PMHx of constipation, DJD, DVT, HTN, HLD, DM, loop recorder, cataracts presents to the ED c/o CP x3 days. +dizziness, +n/v. Did not take blood thinners today. Pt had echo on January 25, 2019. Pt has appointment for a stress test on February 1st. No other complaints at this time.

## 2019-01-28 NOTE — ED ADULT NURSE REASSESSMENT NOTE - NS ED NURSE REASSESS COMMENT FT1
Pt comfortable, denies pain at present. Tolerating food and fluids. Pt informed Dr Carr waiting for results of lab

## 2019-01-28 NOTE — ED STATDOCS - PROGRESS NOTE DETAILS
Gregory Cardozo for attending Dr. Thacker: 57 y/o female with a PMHx of constipation, DJD, DVT, HTN, HLD, DM, loop recorder, cataracts presents to the ED c/o CP x3 days. +dizziness, +n/v. Did not take blood thinners today. Pt had echo on January 25, 2019. Pt has appointment for a stress test on February 1st. No other complaints at this time. Will send pt to main ED for further evaluation. Gregory Cardozo for attending Dr. Thacker: 57 y/o female with a PMHx of constipation, DJD, DVT, HTN, HLD, DM, loop recorder, cataracts presents to the ED c/o CP x3 days. +dizziness, +n/v. Did not take blood thinners today. Pt had echo on January 25, 2019. Pt has appointment for a stress test on February 1st. EKG looks similar to prior, however pt with multiple risk factors. Will send pt to main ED for further evaluation.

## 2019-01-28 NOTE — ED ADULT NURSE NOTE - CHPI ED NUR SYMPTOMS NEG
no chills/no syncope/no shortness of breath/no back pain/no nausea/no fever/no vomiting/no diaphoresis

## 2019-01-28 NOTE — ED PROVIDER NOTE - OBJECTIVE STATEMENT
57 y/o female with a PMHx of constipation, DJD, DVT, HTN, HLD, DM, loop recorder, cataracts presents to the ED c/o CP x3 days. Chest pain is described as burning in her chest and worse in the morning, pt states its like a bad acid reflex. States this is different than the other chest pain she has had, describes as burning and sharp sensation. Pt tries to eat but then she vomits. Pt had echo on January 25, 2019. Pt has appointment for a stress test on February 1st. PMD: Dr. Castro

## 2019-01-28 NOTE — ED ADULT NURSE NOTE - OBJECTIVE STATEMENT
Pt states she has been having CP for 3 days along with a cough. Denies fever, denies nausea or vomiting. Color good.

## 2019-02-25 ENCOUNTER — APPOINTMENT (OUTPATIENT)
Dept: ELECTROPHYSIOLOGY | Facility: CLINIC | Age: 59
End: 2019-02-25
Payer: MEDICAID

## 2019-02-25 PROCEDURE — 93298 REM INTERROG DEV EVAL SCRMS: CPT

## 2019-02-25 PROCEDURE — 93299: CPT

## 2019-03-01 ENCOUNTER — APPOINTMENT (OUTPATIENT)
Dept: ELECTROPHYSIOLOGY | Facility: CLINIC | Age: 59
End: 2019-03-01

## 2019-03-11 ENCOUNTER — APPOINTMENT (OUTPATIENT)
Dept: ELECTROPHYSIOLOGY | Facility: CLINIC | Age: 59
End: 2019-03-11

## 2019-03-29 ENCOUNTER — APPOINTMENT (OUTPATIENT)
Dept: ELECTROPHYSIOLOGY | Facility: CLINIC | Age: 59
End: 2019-03-29
Payer: MEDICAID

## 2019-03-29 PROCEDURE — 93298 REM INTERROG DEV EVAL SCRMS: CPT

## 2019-03-29 PROCEDURE — 93299: CPT

## 2019-04-12 ENCOUNTER — EMERGENCY (EMERGENCY)
Facility: HOSPITAL | Age: 59
LOS: 0 days | Discharge: HOME | End: 2019-04-12
Attending: EMERGENCY MEDICINE | Admitting: EMERGENCY MEDICINE
Payer: MEDICAID

## 2019-04-12 VITALS
RESPIRATION RATE: 18 BRPM | DIASTOLIC BLOOD PRESSURE: 68 MMHG | OXYGEN SATURATION: 100 % | TEMPERATURE: 98 F | SYSTOLIC BLOOD PRESSURE: 164 MMHG | HEART RATE: 87 BPM

## 2019-04-12 VITALS — WEIGHT: 154.98 LBS | HEIGHT: 60 IN

## 2019-04-12 DIAGNOSIS — Z79.4 LONG TERM (CURRENT) USE OF INSULIN: ICD-10-CM

## 2019-04-12 DIAGNOSIS — Z98.51 TUBAL LIGATION STATUS: Chronic | ICD-10-CM

## 2019-04-12 DIAGNOSIS — I10 ESSENTIAL (PRIMARY) HYPERTENSION: ICD-10-CM

## 2019-04-12 DIAGNOSIS — R05 COUGH: ICD-10-CM

## 2019-04-12 DIAGNOSIS — R07.89 OTHER CHEST PAIN: ICD-10-CM

## 2019-04-12 DIAGNOSIS — Z88.0 ALLERGY STATUS TO PENICILLIN: ICD-10-CM

## 2019-04-12 DIAGNOSIS — Z95.828 PRESENCE OF OTHER VASCULAR IMPLANTS AND GRAFTS: Chronic | ICD-10-CM

## 2019-04-12 DIAGNOSIS — Z86.718 PERSONAL HISTORY OF OTHER VENOUS THROMBOSIS AND EMBOLISM: ICD-10-CM

## 2019-04-12 DIAGNOSIS — J40 BRONCHITIS, NOT SPECIFIED AS ACUTE OR CHRONIC: ICD-10-CM

## 2019-04-12 DIAGNOSIS — Z98.890 OTHER SPECIFIED POSTPROCEDURAL STATES: Chronic | ICD-10-CM

## 2019-04-12 DIAGNOSIS — M25.9 JOINT DISORDER, UNSPECIFIED: Chronic | ICD-10-CM

## 2019-04-12 DIAGNOSIS — E11.3592 TYPE 2 DIABETES MELLITUS WITH PROLIFERATIVE DIABETIC RETINOPATHY WITHOUT MACULAR EDEMA, LEFT EYE: Chronic | ICD-10-CM

## 2019-04-12 DIAGNOSIS — Z88.5 ALLERGY STATUS TO NARCOTIC AGENT: ICD-10-CM

## 2019-04-12 DIAGNOSIS — E11.9 TYPE 2 DIABETES MELLITUS WITHOUT COMPLICATIONS: ICD-10-CM

## 2019-04-12 LAB — GLUCOSE BLDC GLUCOMTR-MCNC: 217 MG/DL — HIGH (ref 70–99)

## 2019-04-12 PROCEDURE — 99284 EMERGENCY DEPT VISIT MOD MDM: CPT | Mod: 25

## 2019-04-12 PROCEDURE — 71046 X-RAY EXAM CHEST 2 VIEWS: CPT | Mod: 26

## 2019-04-12 PROCEDURE — 93010 ELECTROCARDIOGRAM REPORT: CPT

## 2019-04-12 RX ORDER — IPRATROPIUM/ALBUTEROL SULFATE 18-103MCG
3 AEROSOL WITH ADAPTER (GRAM) INHALATION ONCE
Qty: 0 | Refills: 0 | Status: COMPLETED | OUTPATIENT
Start: 2019-04-12 | End: 2019-04-12

## 2019-04-12 RX ADMIN — Medication 3 MILLILITER(S): at 16:38

## 2019-04-12 NOTE — ED STATDOCS - PROGRESS NOTE DETAILS
signed Shanda Villeda PA-C Pt seen initially in intake by Dr Thacker.  ID [id number] signed Shanda Villeda PA-C Pt seen initially in intake by Dr Thacker.  ID [id number]  59F c/o cough, HA, mylagias x 5 days. +sick contacts. Pt feeling chest tightness which has resolved after duoneb in ED. pt has albuterol inhaler at home but states the [facility] tx works much better. Explained to pt she would need to f/u with her PMD Dr Sanchez for an rx for nebulizer machine. offered education to pt on how to use inhaler but she declines. No significant findings on xray. Glucose 217, no intervention warranted. Likely viral bronchitis. recommend outpt f/u PMD. return precautions given. Pt feeling well, pt and family agree with DC and plan of care. Lungs CTA bilaterally after tx.

## 2019-04-12 NOTE — ED STATDOCS - CLINICAL SUMMARY MEDICAL DECISION MAKING FREE TEXT BOX
Pt with viral type symptoms, with close contact (daughter) with similar symptoms.  VS WNL.  CXR clear.  Given duoneb in ED with improvement in cough.  Okay for d/c home, f/u with PCP.

## 2019-04-12 NOTE — ED ADULT NURSE NOTE - OBJECTIVE STATEMENT
cough x3 days, feels trouble breathing, denies nvd, palpitations. hx of dm x2, poor site, some pain in chest associated with cough. +sick family members with same symptoms

## 2019-04-12 NOTE — ED STATDOCS - OBJECTIVE STATEMENT
60 y/o female with PMHx of HTN, DM presents to the ED c/o productive cough x5 days. +chest tightness. +HA, generalized myalgias, sore throat. Denies fever, N/V/D, palpitations. PCP/Dr. Sanchez. Nonsmoker. No EtOH. +sick contacts; daughter.

## 2019-04-29 ENCOUNTER — APPOINTMENT (OUTPATIENT)
Dept: ELECTROPHYSIOLOGY | Facility: CLINIC | Age: 59
End: 2019-04-29
Payer: MEDICAID

## 2019-04-29 PROCEDURE — 93299: CPT

## 2019-04-29 PROCEDURE — 93298 REM INTERROG DEV EVAL SCRMS: CPT

## 2019-05-31 ENCOUNTER — APPOINTMENT (OUTPATIENT)
Dept: ELECTROPHYSIOLOGY | Facility: CLINIC | Age: 59
End: 2019-05-31
Payer: MEDICAID

## 2019-05-31 PROCEDURE — 93299: CPT

## 2019-05-31 PROCEDURE — 93298 REM INTERROG DEV EVAL SCRMS: CPT

## 2019-06-11 ENCOUNTER — APPOINTMENT (OUTPATIENT)
Dept: ELECTROPHYSIOLOGY | Facility: CLINIC | Age: 59
End: 2019-06-11

## 2019-07-01 ENCOUNTER — APPOINTMENT (OUTPATIENT)
Dept: ELECTROPHYSIOLOGY | Facility: CLINIC | Age: 59
End: 2019-07-01
Payer: MEDICAID

## 2019-07-01 PROCEDURE — 93299: CPT

## 2019-07-01 PROCEDURE — 93298 REM INTERROG DEV EVAL SCRMS: CPT

## 2019-07-03 NOTE — ASU PATIENT PROFILE, ADULT - PRO INTERPRETER NEED 2
Subjective:  Transitional care management  Date of admission: June 17, 2019  Date of discharge: June 19, 2019  Clinical care coordinator contact: June 20, 2019    Patient was hospitalized with symptoms of fever and confusion.  She was found to have sepsis with shock.  She required Levophed pressures.  4 out of 4 blood cultures were positive for lactose fermenting gram negative rods.  Her final culture grew E. coli which was pansensitive.  She initially was treated with vancomycin and Zosyn.  She had experienced abdominal pain during hospitalization and was evaluated by urology.  A CT scan showed a large obstructing stone with hydronephrosis.  She went to the OR on June 17 for cystoscopy and ureteral stent placement.  A Riggs catheter was placed after the procedure.  She did well with void trial and was seen by urology again today.  She was treated with outpatient antibiotics of Levaquin 750 mg every 48 hours for 10 days.  She took her last dose on June 29.  She reports she is doing well.  She denies fever and chills.  No abdominal pain.  No dysuria.  Initial urine culture during hospitalization showed mixed kojo.  At the time of hospital admission patient was also found to have a mildly elevated troponin of 0.039.  EKG initially showed sinus tachycardia with a follow-up EKG showing resolution of the tachycardia with left axis deviation, PVCs and nonspecific ST-T wave changes and a prolonged QTC of 447 ms.  Left bundle branch that had been seen on initial EKG in the emergency department resolved with tachycardia.  She is not experiencing any chest pain, chest pressure, shortness of breath or palpitation.  She does have stairs at her apartment.  She walks up and down 9 stairs a few times throughout the day.  She did this quite frequently prior to hospital admission.  She is working with home care PT and is using a wheeled walker but when she goes up the stairs she holds onto the rails.  She is getting stronger.   Appetite is good and she is feeling well.  By reviewing additional labs patient initially had a white count up to 44,000 and at the time of discharge had improved down to 35,000.  Her hemoglobin had dropped from 11.3 down to 10.1 g/dL and platelet count dropped down to 105,000.  Sodium level was down to 131 with normal renal function.    Patient Active Problem List   Diagnosis     Allergic rhinitis     Coronary artery disease involving native coronary artery of native heart without angina pectoris     H/O basal cell carcinoma excision     Hearing loss     Hypertension     Hypothyroidism     Osteoarthritis of both hips     Urinary urgency     Unsteady gait     Sepsis (H)     Past Medical History:   Diagnosis Date     Hypertension      Hypothyroid      Osteoarthritis      ST elevation (STEMI) myocardial infarction (H)     1981; angioplasty     Past Surgical History:   Procedure Laterality Date     ARTHROPLASTY HIP Right     Dr Rodriguez     ARTHROPLASTY HIP Left 2015    Dr. Rodriguez     CYSTOSCOPY, RETROGRADES, INSERT STENT URETER(S), COMBINED Right 6/17/2019    Procedure: CYSTOSCOPY, WITH RETROGRADE PYELOGRAM AND URETERAL STENT INSERTION;  Surgeon: Shawn Cole MD;  Location:  OR     Social History     Socioeconomic History     Marital status:      Spouse name: Not on file     Number of children: Not on file     Years of education: Not on file     Highest education level: Not on file   Occupational History     Not on file   Social Needs     Financial resource strain: Not on file     Food insecurity:     Worry: Not on file     Inability: Not on file     Transportation needs:     Medical: Not on file     Non-medical: Not on file   Tobacco Use     Smoking status: Never Smoker     Smokeless tobacco: Never Used   Substance and Sexual Activity     Alcohol use: No     Alcohol/week: 0.0 oz     Drug use: No     Sexual activity: Not Currently     Partners: Male     Birth control/protection: Post-menopausal   Lifestyle      Physical activity:     Days per week: Not on file     Minutes per session: Not on file     Stress: Not on file   Relationships     Social connections:     Talks on phone: Not on file     Gets together: Not on file     Attends Temple service: Not on file     Active member of club or organization: Not on file     Attends meetings of clubs or organizations: Not on file     Relationship status: Not on file     Intimate partner violence:     Fear of current or ex partner: Not on file     Emotionally abused: Not on file     Physically abused: Not on file     Forced sexual activity: Not on file   Other Topics Concern     Not on file   Social History Narrative    , lives in [place].  Previously from [place], [place].  Worked in drug store,  was a pharmacist.     Family History   Problem Relation Age of Onset     Heart Disease Mother 60         of mi     Heart Disease Father 60         of mi     Heart Disease Brother      Heart Disease Brother      Current Outpatient Medications   Medication Sig Dispense Refill     acetaminophen (TYLENOL) 500 MG tablet Take 500 mg by mouth every 6 hours as needed for mild pain       aspirin 81 MG chewable tablet Take 81 mg by mouth daily with food       diltiazem ER (DILT-XR) 180 MG 24 hr capsule Take 1 capsule (180 mg) by mouth daily 90 capsule 3     Incontinence Supply Disposable (POISE PAD) PADS 1 Units daily 30 each 11     levothyroxine (SYNTHROID/LEVOTHROID) 88 MCG tablet TAKE 1 TABLET (88 MCG) BY MOUTH DAILY 180 tablet 3     order for DME Equipment being ordered: plastic underpants 1 Units 1     order for DME Equipment being ordered: Spikes for cane 1 Units 1     Tolterodine      Review of Systems:  Review of Systems   Constitutional: Positive for activity change and fatigue. Negative for appetite change, chills, diaphoresis, fever and unexpected weight change.   Respiratory: Negative for cough, chest tightness and shortness of breath.    Cardiovascular:  "Negative.    Gastrointestinal: Negative for abdominal distention, abdominal pain, diarrhea, nausea and vomiting.   Genitourinary: Negative for decreased urine volume, difficulty urinating, dysuria, flank pain and hematuria.   Musculoskeletal: Positive for gait problem.   Neurological: Positive for weakness. Negative for dizziness.   Psychiatric/Behavioral: Negative for dysphoric mood.       Objective:   /60   Pulse 65   Temp 98.7  F (37.1  C) (Tympanic)   Resp 16   Ht 1.727 m (5' 8\")   Wt 54.4 kg (120 lb)   SpO2 98%   BMI 18.25 kg/m    Physical Exam  Pleasant female in no acute distress.  She is accompanied by her daughter.  Skin color pink.  Sclera nonicteric.  Mucous memories moist.  Neck supple without adenopathy.  Lung fields clear to auscultation throughout.  Cardiovascular regular rate and rhythm with no S3 auscultated.  Abdomen is soft and without masses, tenderness and organomegaly.  No suprapubic tenderness.  Extremities without edema.  Gait is stable with the use of a wheeled walker.  Hospitalization notes laboratory diagnostic studies are all reviewed and discussed.    Assessment:    ICD-10-CM    1. 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 Sepsis due to Escherichia coli (H) A41.51    3. Essential hypertension I10    4. Coronary artery disease involving native coronary artery of native heart without angina pectoris I25.10        Plan:     Patient had acute pyelonephritis causing sepsis with shock.  She was treated with IV and oral antibiotics.  She clinically improved.  She had cystoscopy with ureteral stent placement on June 17, 2019.  She had follow-up appoint with urology today and is scheduled for cystoscopy and stone removal on June 23.  Patient will plan to follow-up in 1 week on " pyelonephritis, sepsis and coronary artery disease history and at that time we will also have a preop visit.  Labs today will include CBC, basic metabolic panel and UA UC.  No blood cultures at this time repeated due to it being a gram negative bacteria.  Patient did have a slightly elevated troponin likely related to cardiac strain from her sepsis.  She has a history of coronary artery disease with angioplasty in the 1990s.  Currently asymptomatic.  We will discuss this at follow-up visit as well.  Blood pressure currently well controlled.  She will continue with home care for strengthening.    Non-face-to-face follow-up/services needed: Recheck labs  Medication reconciliation and management: Hospitalization discharge medications are reviewed and updated today.  There were no changes and patient has completed the course of Levaquin.    DRU Salcedo   7/3/2019  1:29 PM         [place]

## 2019-08-02 ENCOUNTER — APPOINTMENT (OUTPATIENT)
Dept: ELECTROPHYSIOLOGY | Facility: CLINIC | Age: 59
End: 2019-08-02

## 2019-08-15 ENCOUNTER — EMERGENCY (EMERGENCY)
Facility: HOSPITAL | Age: 59
LOS: 0 days | Discharge: HOME | End: 2019-08-15
Attending: EMERGENCY MEDICINE
Payer: MEDICAID

## 2019-08-15 VITALS
SYSTOLIC BLOOD PRESSURE: 123 MMHG | HEART RATE: 77 BPM | OXYGEN SATURATION: 97 % | RESPIRATION RATE: 18 BRPM | DIASTOLIC BLOOD PRESSURE: 63 MMHG | TEMPERATURE: 98 F

## 2019-08-15 VITALS — WEIGHT: 156.97 LBS | HEIGHT: 60 IN

## 2019-08-15 DIAGNOSIS — I10 ESSENTIAL (PRIMARY) HYPERTENSION: ICD-10-CM

## 2019-08-15 DIAGNOSIS — E11.9 TYPE 2 DIABETES MELLITUS WITHOUT COMPLICATIONS: ICD-10-CM

## 2019-08-15 DIAGNOSIS — N39.0 URINARY TRACT INFECTION, SITE NOT SPECIFIED: ICD-10-CM

## 2019-08-15 DIAGNOSIS — Z87.442 PERSONAL HISTORY OF URINARY CALCULI: ICD-10-CM

## 2019-08-15 DIAGNOSIS — Z79.4 LONG TERM (CURRENT) USE OF INSULIN: ICD-10-CM

## 2019-08-15 DIAGNOSIS — Z98.890 OTHER SPECIFIED POSTPROCEDURAL STATES: Chronic | ICD-10-CM

## 2019-08-15 DIAGNOSIS — Z98.51 TUBAL LIGATION STATUS: Chronic | ICD-10-CM

## 2019-08-15 DIAGNOSIS — Z95.828 PRESENCE OF OTHER VASCULAR IMPLANTS AND GRAFTS: Chronic | ICD-10-CM

## 2019-08-15 DIAGNOSIS — R10.9 UNSPECIFIED ABDOMINAL PAIN: ICD-10-CM

## 2019-08-15 DIAGNOSIS — M25.9 JOINT DISORDER, UNSPECIFIED: Chronic | ICD-10-CM

## 2019-08-15 DIAGNOSIS — R30.0 DYSURIA: ICD-10-CM

## 2019-08-15 DIAGNOSIS — E11.3592 TYPE 2 DIABETES MELLITUS WITH PROLIFERATIVE DIABETIC RETINOPATHY WITHOUT MACULAR EDEMA, LEFT EYE: Chronic | ICD-10-CM

## 2019-08-15 LAB
ALBUMIN SERPL ELPH-MCNC: 3.9 G/DL — SIGNIFICANT CHANGE UP (ref 3.3–5)
ALP SERPL-CCNC: 152 U/L — HIGH (ref 40–120)
ALT FLD-CCNC: 27 U/L — SIGNIFICANT CHANGE UP (ref 12–78)
ANION GAP SERPL CALC-SCNC: 6 MMOL/L — SIGNIFICANT CHANGE UP (ref 5–17)
APPEARANCE UR: CLEAR — SIGNIFICANT CHANGE UP
AST SERPL-CCNC: 21 U/L — SIGNIFICANT CHANGE UP (ref 15–37)
BASOPHILS # BLD AUTO: 0.03 K/UL — SIGNIFICANT CHANGE UP (ref 0–0.2)
BASOPHILS NFR BLD AUTO: 0.3 % — SIGNIFICANT CHANGE UP (ref 0–2)
BILIRUB SERPL-MCNC: 0.4 MG/DL — SIGNIFICANT CHANGE UP (ref 0.2–1.2)
BILIRUB UR-MCNC: NEGATIVE — SIGNIFICANT CHANGE UP
BUN SERPL-MCNC: 14 MG/DL — SIGNIFICANT CHANGE UP (ref 7–23)
CALCIUM SERPL-MCNC: 9.7 MG/DL — SIGNIFICANT CHANGE UP (ref 8.5–10.1)
CHLORIDE SERPL-SCNC: 107 MMOL/L — SIGNIFICANT CHANGE UP (ref 96–108)
CO2 SERPL-SCNC: 30 MMOL/L — SIGNIFICANT CHANGE UP (ref 22–31)
COLOR SPEC: YELLOW — SIGNIFICANT CHANGE UP
CREAT SERPL-MCNC: 0.85 MG/DL — SIGNIFICANT CHANGE UP (ref 0.5–1.3)
DIFF PNL FLD: NEGATIVE — SIGNIFICANT CHANGE UP
EOSINOPHIL # BLD AUTO: 0.47 K/UL — SIGNIFICANT CHANGE UP (ref 0–0.5)
EOSINOPHIL NFR BLD AUTO: 4.2 % — SIGNIFICANT CHANGE UP (ref 0–6)
GLUCOSE SERPL-MCNC: 123 MG/DL — HIGH (ref 70–99)
GLUCOSE UR QL: 100 MG/DL
HCT VFR BLD CALC: 41.9 % — SIGNIFICANT CHANGE UP (ref 34.5–45)
HGB BLD-MCNC: 14 G/DL — SIGNIFICANT CHANGE UP (ref 11.5–15.5)
IMM GRANULOCYTES NFR BLD AUTO: 0.2 % — SIGNIFICANT CHANGE UP (ref 0–1.5)
KETONES UR-MCNC: NEGATIVE — SIGNIFICANT CHANGE UP
LEUKOCYTE ESTERASE UR-ACNC: ABNORMAL
LYMPHOCYTES # BLD AUTO: 47.4 % — HIGH (ref 13–44)
LYMPHOCYTES # BLD AUTO: 5.33 K/UL — HIGH (ref 1–3.3)
MCHC RBC-ENTMCNC: 29.9 PG — SIGNIFICANT CHANGE UP (ref 27–34)
MCHC RBC-ENTMCNC: 33.4 GM/DL — SIGNIFICANT CHANGE UP (ref 32–36)
MCV RBC AUTO: 89.5 FL — SIGNIFICANT CHANGE UP (ref 80–100)
MONOCYTES # BLD AUTO: 0.9 K/UL — SIGNIFICANT CHANGE UP (ref 0–0.9)
MONOCYTES NFR BLD AUTO: 8 % — SIGNIFICANT CHANGE UP (ref 2–14)
NEUTROPHILS # BLD AUTO: 4.5 K/UL — SIGNIFICANT CHANGE UP (ref 1.8–7.4)
NEUTROPHILS NFR BLD AUTO: 39.9 % — LOW (ref 43–77)
NITRITE UR-MCNC: NEGATIVE — SIGNIFICANT CHANGE UP
PH UR: 6 — SIGNIFICANT CHANGE UP (ref 5–8)
PLATELET # BLD AUTO: 277 K/UL — SIGNIFICANT CHANGE UP (ref 150–400)
POTASSIUM SERPL-MCNC: 4.2 MMOL/L — SIGNIFICANT CHANGE UP (ref 3.5–5.3)
POTASSIUM SERPL-SCNC: 4.2 MMOL/L — SIGNIFICANT CHANGE UP (ref 3.5–5.3)
PROT SERPL-MCNC: 7.8 GM/DL — SIGNIFICANT CHANGE UP (ref 6–8.3)
PROT UR-MCNC: NEGATIVE MG/DL — SIGNIFICANT CHANGE UP
RBC # BLD: 4.68 M/UL — SIGNIFICANT CHANGE UP (ref 3.8–5.2)
RBC # FLD: 13.3 % — SIGNIFICANT CHANGE UP (ref 10.3–14.5)
SODIUM SERPL-SCNC: 143 MMOL/L — SIGNIFICANT CHANGE UP (ref 135–145)
SP GR SPEC: 1 — LOW (ref 1.01–1.02)
UROBILINOGEN FLD QL: NEGATIVE MG/DL — SIGNIFICANT CHANGE UP
WBC # BLD: 11.25 K/UL — HIGH (ref 3.8–10.5)
WBC # FLD AUTO: 11.25 K/UL — HIGH (ref 3.8–10.5)

## 2019-08-15 PROCEDURE — 85025 COMPLETE CBC W/AUTO DIFF WBC: CPT

## 2019-08-15 PROCEDURE — 99284 EMERGENCY DEPT VISIT MOD MDM: CPT

## 2019-08-15 PROCEDURE — 96374 THER/PROPH/DIAG INJ IV PUSH: CPT

## 2019-08-15 PROCEDURE — 87086 URINE CULTURE/COLONY COUNT: CPT

## 2019-08-15 PROCEDURE — 36415 COLL VENOUS BLD VENIPUNCTURE: CPT

## 2019-08-15 PROCEDURE — 99284 EMERGENCY DEPT VISIT MOD MDM: CPT | Mod: 25

## 2019-08-15 PROCEDURE — 80053 COMPREHEN METABOLIC PANEL: CPT

## 2019-08-15 PROCEDURE — 87186 SC STD MICRODIL/AGAR DIL: CPT

## 2019-08-15 PROCEDURE — 81001 URINALYSIS AUTO W/SCOPE: CPT

## 2019-08-15 PROCEDURE — 96375 TX/PRO/DX INJ NEW DRUG ADDON: CPT

## 2019-08-15 RX ORDER — KETOROLAC TROMETHAMINE 30 MG/ML
30 SYRINGE (ML) INJECTION ONCE
Refills: 0 | Status: DISCONTINUED | OUTPATIENT
Start: 2019-08-15 | End: 2019-08-15

## 2019-08-15 RX ORDER — SODIUM CHLORIDE 9 MG/ML
1000 INJECTION INTRAMUSCULAR; INTRAVENOUS; SUBCUTANEOUS ONCE
Refills: 0 | Status: COMPLETED | OUTPATIENT
Start: 2019-08-15 | End: 2019-08-15

## 2019-08-15 RX ORDER — CEPHALEXIN 500 MG
1 CAPSULE ORAL
Qty: 14 | Refills: 0
Start: 2019-08-15 | End: 2019-08-21

## 2019-08-15 RX ORDER — CEFTRIAXONE 500 MG/1
1000 INJECTION, POWDER, FOR SOLUTION INTRAMUSCULAR; INTRAVENOUS ONCE
Refills: 0 | Status: COMPLETED | OUTPATIENT
Start: 2019-08-15 | End: 2019-08-15

## 2019-08-15 RX ORDER — ONDANSETRON 8 MG/1
4 TABLET, FILM COATED ORAL ONCE
Refills: 0 | Status: COMPLETED | OUTPATIENT
Start: 2019-08-15 | End: 2019-08-15

## 2019-08-15 RX ADMIN — ONDANSETRON 4 MILLIGRAM(S): 8 TABLET, FILM COATED ORAL at 19:42

## 2019-08-15 RX ADMIN — CEFTRIAXONE 1000 MILLIGRAM(S): 500 INJECTION, POWDER, FOR SOLUTION INTRAMUSCULAR; INTRAVENOUS at 20:28

## 2019-08-15 RX ADMIN — Medication 30 MILLIGRAM(S): at 19:41

## 2019-08-15 RX ADMIN — SODIUM CHLORIDE 2000 MILLILITER(S): 9 INJECTION INTRAMUSCULAR; INTRAVENOUS; SUBCUTANEOUS at 19:34

## 2019-08-15 NOTE — ED ADULT TRIAGE NOTE - CHIEF COMPLAINT QUOTE
Pt reports abdominal pain. Reports dysuria.  Reports constipation.  pt clinically upgraded with c/o 8 out of 9 pain.

## 2019-08-15 NOTE — ED STATDOCS - OBJECTIVE STATEMENT
60 y/o female with PMHx of HTN, DM, Kidney Stones presents to the ED c/o abd pain, dysuria for the past few days. Pt states that her pain radiates down both her legs. Pt describes the pain as a burning sensation down both her legs. Denies any N/V. Pt states that she has been using Vicks topical for her symptoms. No hx of abd surgeries. Pt is Kinyarwanda speaking but allows daughter at bedside to translate.

## 2019-08-15 NOTE — ED STATDOCS - PROGRESS NOTE DETAILS
58 y/o F presents with dysuria. Pt reports burning urination for the past few days. Denies fever/chills, n/v/d, CP, SOB, abd pain, or other complaints at this time. -Issa Austin PA-C Results reviewed and discussed with pt. UA +, will treat with keflex.  Discussed importance of close FU with PMD. Pt asked to return to ED immediately for any new or concerning sx or worsening. Pt acknowledges and understands plan -Issa Austin PA-C Blood cultures are not indicated at this time. -Issa Austin PA-C

## 2019-08-15 NOTE — ED ADULT NURSE NOTE - OBJECTIVE STATEMENT
pt presents to ED c/o painful urination and frequency last few days. no other ccs at this time. pt ambulatory to give urine sample. also c/o pelvic pain. pt in no active distress. LAC 20G placed, labs sent, and fluids going. daughter at bedside. Romansh speaking only. RN will ctm

## 2019-08-17 NOTE — ED POST DISCHARGE NOTE - OTHER COMMUNICATION
Pt treated with keflex and culture shows sensitivity to 1st generation ceph. Ed treatment appropriate. -Kane Kwan PA-C

## 2019-09-06 ENCOUNTER — APPOINTMENT (OUTPATIENT)
Dept: ELECTROPHYSIOLOGY | Facility: CLINIC | Age: 59
End: 2019-09-06

## 2019-10-07 ENCOUNTER — APPOINTMENT (OUTPATIENT)
Dept: ELECTROPHYSIOLOGY | Facility: CLINIC | Age: 59
End: 2019-10-07

## 2019-11-06 ENCOUNTER — RESULT REVIEW (OUTPATIENT)
Age: 59
End: 2019-11-06

## 2019-11-08 ENCOUNTER — APPOINTMENT (OUTPATIENT)
Dept: ELECTROPHYSIOLOGY | Facility: CLINIC | Age: 59
End: 2019-11-08

## 2019-12-06 ENCOUNTER — APPOINTMENT (OUTPATIENT)
Dept: MAMMOGRAPHY | Facility: CLINIC | Age: 59
End: 2019-12-06
Payer: MEDICAID

## 2019-12-06 ENCOUNTER — OUTPATIENT (OUTPATIENT)
Dept: OUTPATIENT SERVICES | Facility: HOSPITAL | Age: 59
LOS: 1 days | End: 2019-12-06
Payer: MEDICAID

## 2019-12-06 DIAGNOSIS — Z98.51 TUBAL LIGATION STATUS: Chronic | ICD-10-CM

## 2019-12-06 DIAGNOSIS — Z98.890 OTHER SPECIFIED POSTPROCEDURAL STATES: Chronic | ICD-10-CM

## 2019-12-06 DIAGNOSIS — M25.9 JOINT DISORDER, UNSPECIFIED: Chronic | ICD-10-CM

## 2019-12-06 DIAGNOSIS — E11.3592 TYPE 2 DIABETES MELLITUS WITH PROLIFERATIVE DIABETIC RETINOPATHY WITHOUT MACULAR EDEMA, LEFT EYE: Chronic | ICD-10-CM

## 2019-12-06 DIAGNOSIS — Z01.419 ENCOUNTER FOR GYNECOLOGICAL EXAMINATION (GENERAL) (ROUTINE) WITHOUT ABNORMAL FINDINGS: ICD-10-CM

## 2019-12-06 DIAGNOSIS — Z95.828 PRESENCE OF OTHER VASCULAR IMPLANTS AND GRAFTS: Chronic | ICD-10-CM

## 2019-12-06 PROCEDURE — 77063 BREAST TOMOSYNTHESIS BI: CPT | Mod: 26

## 2019-12-06 PROCEDURE — 77067 SCR MAMMO BI INCL CAD: CPT

## 2019-12-06 PROCEDURE — 77067 SCR MAMMO BI INCL CAD: CPT | Mod: 26

## 2019-12-06 PROCEDURE — 77063 BREAST TOMOSYNTHESIS BI: CPT

## 2019-12-09 ENCOUNTER — APPOINTMENT (OUTPATIENT)
Dept: ELECTROPHYSIOLOGY | Facility: CLINIC | Age: 59
End: 2019-12-09

## 2019-12-20 ENCOUNTER — TRANSCRIPTION ENCOUNTER (OUTPATIENT)
Age: 59
End: 2019-12-20

## 2019-12-23 ENCOUNTER — EMERGENCY (EMERGENCY)
Facility: HOSPITAL | Age: 59
LOS: 0 days | Discharge: HOME | End: 2019-12-23
Attending: EMERGENCY MEDICINE
Payer: MEDICAID

## 2019-12-23 VITALS
TEMPERATURE: 98 F | SYSTOLIC BLOOD PRESSURE: 121 MMHG | DIASTOLIC BLOOD PRESSURE: 65 MMHG | RESPIRATION RATE: 18 BRPM | HEART RATE: 79 BPM | OXYGEN SATURATION: 96 %

## 2019-12-23 VITALS — WEIGHT: 149.91 LBS | HEIGHT: 60 IN

## 2019-12-23 DIAGNOSIS — Z95.828 PRESENCE OF OTHER VASCULAR IMPLANTS AND GRAFTS: Chronic | ICD-10-CM

## 2019-12-23 DIAGNOSIS — Z98.890 OTHER SPECIFIED POSTPROCEDURAL STATES: Chronic | ICD-10-CM

## 2019-12-23 DIAGNOSIS — M25.561 PAIN IN RIGHT KNEE: ICD-10-CM

## 2019-12-23 DIAGNOSIS — M25.461 EFFUSION, RIGHT KNEE: ICD-10-CM

## 2019-12-23 DIAGNOSIS — M25.9 JOINT DISORDER, UNSPECIFIED: Chronic | ICD-10-CM

## 2019-12-23 DIAGNOSIS — E11.3592 TYPE 2 DIABETES MELLITUS WITH PROLIFERATIVE DIABETIC RETINOPATHY WITHOUT MACULAR EDEMA, LEFT EYE: Chronic | ICD-10-CM

## 2019-12-23 DIAGNOSIS — Z88.1 ALLERGY STATUS TO OTHER ANTIBIOTIC AGENTS STATUS: ICD-10-CM

## 2019-12-23 DIAGNOSIS — N30.01 ACUTE CYSTITIS WITH HEMATURIA: ICD-10-CM

## 2019-12-23 DIAGNOSIS — Z98.51 TUBAL LIGATION STATUS: Chronic | ICD-10-CM

## 2019-12-23 DIAGNOSIS — M54.5 LOW BACK PAIN: ICD-10-CM

## 2019-12-23 LAB
ANION GAP SERPL CALC-SCNC: 3 MMOL/L — LOW (ref 5–17)
APPEARANCE UR: CLEAR — SIGNIFICANT CHANGE UP
APTT BLD: 30.8 SEC — SIGNIFICANT CHANGE UP (ref 27.5–36.3)
BASOPHILS # BLD AUTO: 0.06 K/UL — SIGNIFICANT CHANGE UP (ref 0–0.2)
BASOPHILS NFR BLD AUTO: 0.5 % — SIGNIFICANT CHANGE UP (ref 0–2)
BILIRUB UR-MCNC: NEGATIVE — SIGNIFICANT CHANGE UP
BUN SERPL-MCNC: 8 MG/DL — SIGNIFICANT CHANGE UP (ref 7–23)
CALCIUM SERPL-MCNC: 9.2 MG/DL — SIGNIFICANT CHANGE UP (ref 8.5–10.1)
CHLORIDE SERPL-SCNC: 107 MMOL/L — SIGNIFICANT CHANGE UP (ref 96–108)
CO2 SERPL-SCNC: 28 MMOL/L — SIGNIFICANT CHANGE UP (ref 22–31)
COLOR SPEC: YELLOW — SIGNIFICANT CHANGE UP
CREAT SERPL-MCNC: 0.9 MG/DL — SIGNIFICANT CHANGE UP (ref 0.5–1.3)
DIFF PNL FLD: NEGATIVE — SIGNIFICANT CHANGE UP
EOSINOPHIL # BLD AUTO: 0.33 K/UL — SIGNIFICANT CHANGE UP (ref 0–0.5)
EOSINOPHIL NFR BLD AUTO: 2.6 % — SIGNIFICANT CHANGE UP (ref 0–6)
GLUCOSE SERPL-MCNC: 97 MG/DL — SIGNIFICANT CHANGE UP (ref 70–99)
GLUCOSE UR QL: NEGATIVE MG/DL — SIGNIFICANT CHANGE UP
HCT VFR BLD CALC: 40.2 % — SIGNIFICANT CHANGE UP (ref 34.5–45)
HGB BLD-MCNC: 13.1 G/DL — SIGNIFICANT CHANGE UP (ref 11.5–15.5)
IMM GRANULOCYTES NFR BLD AUTO: 0.3 % — SIGNIFICANT CHANGE UP (ref 0–1.5)
INR BLD: 0.96 RATIO — SIGNIFICANT CHANGE UP (ref 0.88–1.16)
KETONES UR-MCNC: NEGATIVE — SIGNIFICANT CHANGE UP
LEUKOCYTE ESTERASE UR-ACNC: ABNORMAL
LYMPHOCYTES # BLD AUTO: 36.1 % — SIGNIFICANT CHANGE UP (ref 13–44)
LYMPHOCYTES # BLD AUTO: 4.64 K/UL — HIGH (ref 1–3.3)
MCHC RBC-ENTMCNC: 29.6 PG — SIGNIFICANT CHANGE UP (ref 27–34)
MCHC RBC-ENTMCNC: 32.6 GM/DL — SIGNIFICANT CHANGE UP (ref 32–36)
MCV RBC AUTO: 91 FL — SIGNIFICANT CHANGE UP (ref 80–100)
MONOCYTES # BLD AUTO: 0.71 K/UL — SIGNIFICANT CHANGE UP (ref 0–0.9)
MONOCYTES NFR BLD AUTO: 5.5 % — SIGNIFICANT CHANGE UP (ref 2–14)
NEUTROPHILS # BLD AUTO: 7.09 K/UL — SIGNIFICANT CHANGE UP (ref 1.8–7.4)
NEUTROPHILS NFR BLD AUTO: 55 % — SIGNIFICANT CHANGE UP (ref 43–77)
NITRITE UR-MCNC: POSITIVE
PH UR: 7 — SIGNIFICANT CHANGE UP (ref 5–8)
PLATELET # BLD AUTO: 339 K/UL — SIGNIFICANT CHANGE UP (ref 150–400)
POTASSIUM SERPL-MCNC: 5.3 MMOL/L — SIGNIFICANT CHANGE UP (ref 3.5–5.3)
POTASSIUM SERPL-SCNC: 5.3 MMOL/L — SIGNIFICANT CHANGE UP (ref 3.5–5.3)
PROT UR-MCNC: NEGATIVE MG/DL — SIGNIFICANT CHANGE UP
PROTHROM AB SERPL-ACNC: 10.6 SEC — SIGNIFICANT CHANGE UP (ref 10–12.9)
RBC # BLD: 4.42 M/UL — SIGNIFICANT CHANGE UP (ref 3.8–5.2)
RBC # FLD: 13.4 % — SIGNIFICANT CHANGE UP (ref 10.3–14.5)
SODIUM SERPL-SCNC: 138 MMOL/L — SIGNIFICANT CHANGE UP (ref 135–145)
SP GR SPEC: 1 — LOW (ref 1.01–1.02)
UROBILINOGEN FLD QL: NEGATIVE MG/DL — SIGNIFICANT CHANGE UP
WBC # BLD: 12.87 K/UL — HIGH (ref 3.8–10.5)
WBC # FLD AUTO: 12.87 K/UL — HIGH (ref 3.8–10.5)

## 2019-12-23 PROCEDURE — 87086 URINE CULTURE/COLONY COUNT: CPT

## 2019-12-23 PROCEDURE — 99284 EMERGENCY DEPT VISIT MOD MDM: CPT | Mod: 25

## 2019-12-23 PROCEDURE — 36415 COLL VENOUS BLD VENIPUNCTURE: CPT

## 2019-12-23 PROCEDURE — 93971 EXTREMITY STUDY: CPT | Mod: RT

## 2019-12-23 PROCEDURE — 85610 PROTHROMBIN TIME: CPT

## 2019-12-23 PROCEDURE — 93971 EXTREMITY STUDY: CPT | Mod: 26,RT

## 2019-12-23 PROCEDURE — 96374 THER/PROPH/DIAG INJ IV PUSH: CPT

## 2019-12-23 PROCEDURE — 80048 BASIC METABOLIC PNL TOTAL CA: CPT

## 2019-12-23 PROCEDURE — 81001 URINALYSIS AUTO W/SCOPE: CPT

## 2019-12-23 PROCEDURE — 85025 COMPLETE CBC W/AUTO DIFF WBC: CPT

## 2019-12-23 PROCEDURE — 99284 EMERGENCY DEPT VISIT MOD MDM: CPT

## 2019-12-23 PROCEDURE — 85730 THROMBOPLASTIN TIME PARTIAL: CPT

## 2019-12-23 RX ORDER — KETOROLAC TROMETHAMINE 30 MG/ML
30 SYRINGE (ML) INJECTION ONCE
Refills: 0 | Status: DISCONTINUED | OUTPATIENT
Start: 2019-12-23 | End: 2019-12-23

## 2019-12-23 RX ADMIN — Medication 30 MILLIGRAM(S): at 18:52

## 2019-12-23 RX ADMIN — Medication 30 MILLIGRAM(S): at 18:27

## 2019-12-23 NOTE — ED STATDOCS - PROGRESS NOTE DETAILS
58 y/o Female reports to ED c/o R knee pain, acute onset yesterday.  Feels swollen, hurts to move knee.  also c/o low back pain.  Neg fevers ,trauma,. falls.  H/O DVT in the past.  F./U US and re-eval s.p meds.  Gisella Shook PA-C Doppler Neg for DVT.  small suprapatellar effusion.  Neg erythema, fever.  Evidence of UTI on U/A.  Discussed with pt through daughter interpretation, she confirmed Abx Rx is ready at pt's pharmacy for UTI treatment.  Will start tomorrow.  Instructed to cont. Nsaids, RICE. Refer to Trever Shook PA-C

## 2019-12-23 NOTE — ED STATDOCS - CARE PLAN
Principal Discharge DX:	Acute pain of right knee  Secondary Diagnosis:	Suprapatellar effusion of knee  Secondary Diagnosis:	Acute cystitis with hematuria

## 2019-12-23 NOTE — ED STATDOCS - NS ED ROS FT
Constitutional: No fever or chills  Eyes: No visual changes  HEENT: No throat pain  CV: No chest pain  Resp: No SOB no cough  GI: No abd pain, nausea or vomiting  : No dysuria  MSK: +R knee pain, +back pain  Skin: No rash  Neuro: No headache

## 2019-12-23 NOTE — ED STATDOCS - CARE PROVIDER_API CALL
Tyson Akhtar (DO)  Orthopaedic Surgery  [address] [place] [address]  Phone: [phone]  Fax: [phone]  Follow Up Time:

## 2019-12-23 NOTE — ED STATDOCS - PHYSICAL EXAMINATION
Constitutional: NAD AAOx3  Eyes: PERRLA EOMI  Head: Normocephalic atraumatic  Mouth: MMM  Cardiac: regular rate, Normal peripheral pulses  Resp: Lungs CTAB  GI: Abd s/nt/nd  Neuro: CN2-12 intact  Skin: No rashes  MSK: pain behind right knee, no redness or warmth to knee, compartments are soft, no saddle anesthesia, extension intact, no laxity to joint. no midline spinal tenderness. Constitutional: NAD AAOx3  Eyes: PERRLA EOMI  Head: Normocephalic atraumatic  Mouth: MMM  Cardiac: regular rate, Normal peripheral pulses  Resp: Lungs CTAB  GI: Abd s/nt/nd  Neuro: CN2-12 intact  Skin: No rashes  MSK: pain behind right knee, no redness or warmth to knee, compartments are soft, no saddle anesthesia, extension intact, no laxity to joint. no midline spinal tenderness. no cvat

## 2019-12-23 NOTE — ED STATDOCS - TELEPHONIC ID NUMBER OF THE INTERPRETER
Met with patient and  prior to her first radiation treatment with Dr. Pepe White for her recent metastatic lung cancer to the brain diagnosis. Introduced myself and explained my role with patients receiving treatment at our center. Patient and  was friendly and receptive. Instructed on next steps including radiation treatments per Dr. Hoda Mccann recommendations and follow up care. Reviewed process for radiation. Answered questions regarding card that she will receive today to use to check in and for treatment and the schedule that she will receive today after treatment. They have no needs with transportation or finances at this time. Provided with literature [facility]. Provided with my contact information and instructed patient to call me with questions or concerns. Verbalizes understanding. Patient appreciative of visit. Will continue to follow.
[id number]

## 2019-12-23 NOTE — ED STATDOCS - PATIENT PORTAL LINK FT
You can access the FollowMyHealth Patient Portal offered by [facility] by registering at the following website: [email or web address]. By joining [employer]’s FollowMyHealth portal, you will also be able to view your health information using other applications (apps) compatible with our system.

## 2019-12-23 NOTE — ED STATDOCS - CLINICAL SUMMARY MEDICAL DECISION MAKING FREE TEXT BOX
60 y/o female hx of HTN, HLD, DM, DVT in the past, presents to the ED for right leg pain, feels similar to previous DVT, no trauma, no warmth or swelling, pt also with mild back pain, concern for DVT vs likely sciatica, no sign of septic joint. will obtain duplex to r/o DVT, control and reassess. 60 y/o female hx of HTN, HLD, DM, DVT in the past, presents to the ED for right leg pain, feels similar to previous DVT, no trauma, no warmth or swelling, pt also with mild back pain, concern for DVT vs likely sciatica, no sign of septic joint or pyelo. will obtain duplex to r/o DVT, pain control and reassess.

## 2019-12-23 NOTE — ED STATDOCS - NS_ ATTENDINGSCRIBEDETAILS _ED_A_ED_FT
I, Jay Lopez MD,  performed the initial face to face bedside interview with this patient regarding history of present illness, review of symptoms and relevant past medical, social and family history.  I completed an independent physical examination.  I was the initial provider who evaluated this patient.  The history, relevant review of systems, past medical and surgical history, medical decision making, and physical examination was documented by the scribe in my presence and I attest to the accuracy of the documentation.

## 2019-12-23 NOTE — ED STATDOCS - OBJECTIVE STATEMENT
60 y/o female with a PMHx of DM2, DVT in left leg, HLD, HTN, presents to the ED c/o sudden onset right knee pain with a burning sensation radiating to foot. +right knee swelling. +back pain. +leg cramps. Has difficulty ambulating.  Denies fall or trauma. States pain is similar to previous DVT. Notes she recently went to the ED 3 days ago, for flank pain and was dx with a UTI today and rx'd abx.

## 2019-12-23 NOTE — ED ADULT NURSE NOTE - OBJECTIVE STATEMENT
Pt presents to Ed from home, pt alert and orientedx4, VSss afebrule, pt c/o right knee pain for two days, epesicallt behind the knee, pt c/o swelling to knee as well. pt deneis fall trauma fever, hx of gout, pt has inc. pain with ambulation, safety maintained will continue to  monitor

## 2019-12-23 NOTE — ED STATDOCS - PSH
[place] filter in place  left  H/O tubal ligation    History of cystoscopy  history  nephrolithiasis and recurrent UTI S/P ESWL 5/2016  History of loop recorder    Proliferative diabetic retinopathy of left eye  s/p PPV/laser/silicone  5/31/16 and 11/29/16 OS  Shoulder disorder  left

## 2019-12-25 LAB
CULTURE RESULTS: SIGNIFICANT CHANGE UP
SPECIMEN SOURCE: SIGNIFICANT CHANGE UP

## 2020-01-27 ENCOUNTER — EMERGENCY (EMERGENCY)
Facility: HOSPITAL | Age: 60
LOS: 0 days | Discharge: HOME | End: 2020-01-27
Attending: HOSPITALIST
Payer: MEDICAID

## 2020-01-27 VITALS
RESPIRATION RATE: 20 BRPM | OXYGEN SATURATION: 100 % | HEART RATE: 103 BPM | SYSTOLIC BLOOD PRESSURE: 153 MMHG | TEMPERATURE: 98 F | DIASTOLIC BLOOD PRESSURE: 81 MMHG

## 2020-01-27 VITALS
HEART RATE: 93 BPM | OXYGEN SATURATION: 100 % | RESPIRATION RATE: 18 BRPM | SYSTOLIC BLOOD PRESSURE: 136 MMHG | TEMPERATURE: 98 F | DIASTOLIC BLOOD PRESSURE: 69 MMHG

## 2020-01-27 DIAGNOSIS — Z86.718 PERSONAL HISTORY OF OTHER VENOUS THROMBOSIS AND EMBOLISM: ICD-10-CM

## 2020-01-27 DIAGNOSIS — E11.3592 TYPE 2 DIABETES MELLITUS WITH PROLIFERATIVE DIABETIC RETINOPATHY WITHOUT MACULAR EDEMA, LEFT EYE: Chronic | ICD-10-CM

## 2020-01-27 DIAGNOSIS — Z79.4 LONG TERM (CURRENT) USE OF INSULIN: ICD-10-CM

## 2020-01-27 DIAGNOSIS — R30.0 DYSURIA: ICD-10-CM

## 2020-01-27 DIAGNOSIS — N12 TUBULO-INTERSTITIAL NEPHRITIS, NOT SPECIFIED AS ACUTE OR CHRONIC: ICD-10-CM

## 2020-01-27 DIAGNOSIS — Z88.0 ALLERGY STATUS TO PENICILLIN: ICD-10-CM

## 2020-01-27 DIAGNOSIS — E11.9 TYPE 2 DIABETES MELLITUS WITHOUT COMPLICATIONS: ICD-10-CM

## 2020-01-27 DIAGNOSIS — Z95.828 PRESENCE OF OTHER VASCULAR IMPLANTS AND GRAFTS: Chronic | ICD-10-CM

## 2020-01-27 DIAGNOSIS — Z98.890 OTHER SPECIFIED POSTPROCEDURAL STATES: Chronic | ICD-10-CM

## 2020-01-27 DIAGNOSIS — E11.3592 TYPE 2 DIABETES MELLITUS WITH PROLIFERATIVE DIABETIC RETINOPATHY WITHOUT MACULAR EDEMA, LEFT EYE: ICD-10-CM

## 2020-01-27 DIAGNOSIS — M54.9 DORSALGIA, UNSPECIFIED: ICD-10-CM

## 2020-01-27 DIAGNOSIS — I10 ESSENTIAL (PRIMARY) HYPERTENSION: ICD-10-CM

## 2020-01-27 DIAGNOSIS — Z98.51 TUBAL LIGATION STATUS: Chronic | ICD-10-CM

## 2020-01-27 DIAGNOSIS — M25.9 JOINT DISORDER, UNSPECIFIED: Chronic | ICD-10-CM

## 2020-01-27 DIAGNOSIS — E78.5 HYPERLIPIDEMIA, UNSPECIFIED: ICD-10-CM

## 2020-01-27 LAB
ALBUMIN SERPL ELPH-MCNC: 3.5 G/DL — SIGNIFICANT CHANGE UP (ref 3.3–5)
ALP SERPL-CCNC: 181 U/L — HIGH (ref 40–120)
ALT FLD-CCNC: 27 U/L — SIGNIFICANT CHANGE UP (ref 12–78)
ANION GAP SERPL CALC-SCNC: 6 MMOL/L — SIGNIFICANT CHANGE UP (ref 5–17)
APPEARANCE UR: CLEAR — SIGNIFICANT CHANGE UP
AST SERPL-CCNC: 15 U/L — SIGNIFICANT CHANGE UP (ref 15–37)
BASOPHILS # BLD AUTO: 0.04 K/UL — SIGNIFICANT CHANGE UP (ref 0–0.2)
BASOPHILS NFR BLD AUTO: 0.3 % — SIGNIFICANT CHANGE UP (ref 0–2)
BILIRUB SERPL-MCNC: 0.3 MG/DL — SIGNIFICANT CHANGE UP (ref 0.2–1.2)
BILIRUB UR-MCNC: NEGATIVE — SIGNIFICANT CHANGE UP
BUN SERPL-MCNC: 18 MG/DL — SIGNIFICANT CHANGE UP (ref 7–23)
CALCIUM SERPL-MCNC: 8.7 MG/DL — SIGNIFICANT CHANGE UP (ref 8.5–10.1)
CHLORIDE SERPL-SCNC: 107 MMOL/L — SIGNIFICANT CHANGE UP (ref 96–108)
CO2 SERPL-SCNC: 27 MMOL/L — SIGNIFICANT CHANGE UP (ref 22–31)
COLOR SPEC: YELLOW — SIGNIFICANT CHANGE UP
CREAT SERPL-MCNC: 0.81 MG/DL — SIGNIFICANT CHANGE UP (ref 0.5–1.3)
DIFF PNL FLD: NEGATIVE — SIGNIFICANT CHANGE UP
EOSINOPHIL # BLD AUTO: 0.51 K/UL — HIGH (ref 0–0.5)
EOSINOPHIL NFR BLD AUTO: 4.1 % — SIGNIFICANT CHANGE UP (ref 0–6)
GLUCOSE SERPL-MCNC: 267 MG/DL — HIGH (ref 70–99)
GLUCOSE UR QL: NEGATIVE MG/DL — SIGNIFICANT CHANGE UP
HCT VFR BLD CALC: 40.1 % — SIGNIFICANT CHANGE UP (ref 34.5–45)
HGB BLD-MCNC: 13.2 G/DL — SIGNIFICANT CHANGE UP (ref 11.5–15.5)
IMM GRANULOCYTES NFR BLD AUTO: 0.2 % — SIGNIFICANT CHANGE UP (ref 0–1.5)
KETONES UR-MCNC: NEGATIVE — SIGNIFICANT CHANGE UP
LEUKOCYTE ESTERASE UR-ACNC: ABNORMAL
LYMPHOCYTES # BLD AUTO: 4.98 K/UL — HIGH (ref 1–3.3)
LYMPHOCYTES # BLD AUTO: 40.5 % — SIGNIFICANT CHANGE UP (ref 13–44)
MCHC RBC-ENTMCNC: 29.7 PG — SIGNIFICANT CHANGE UP (ref 27–34)
MCHC RBC-ENTMCNC: 32.9 GM/DL — SIGNIFICANT CHANGE UP (ref 32–36)
MCV RBC AUTO: 90.1 FL — SIGNIFICANT CHANGE UP (ref 80–100)
MONOCYTES # BLD AUTO: 0.55 K/UL — SIGNIFICANT CHANGE UP (ref 0–0.9)
MONOCYTES NFR BLD AUTO: 4.5 % — SIGNIFICANT CHANGE UP (ref 2–14)
NEUTROPHILS # BLD AUTO: 6.19 K/UL — SIGNIFICANT CHANGE UP (ref 1.8–7.4)
NEUTROPHILS NFR BLD AUTO: 50.4 % — SIGNIFICANT CHANGE UP (ref 43–77)
NITRITE UR-MCNC: POSITIVE
PH UR: 6 — SIGNIFICANT CHANGE UP (ref 5–8)
PLATELET # BLD AUTO: 332 K/UL — SIGNIFICANT CHANGE UP (ref 150–400)
POTASSIUM SERPL-MCNC: 4.4 MMOL/L — SIGNIFICANT CHANGE UP (ref 3.5–5.3)
POTASSIUM SERPL-SCNC: 4.4 MMOL/L — SIGNIFICANT CHANGE UP (ref 3.5–5.3)
PROT SERPL-MCNC: 7.7 GM/DL — SIGNIFICANT CHANGE UP (ref 6–8.3)
PROT UR-MCNC: NEGATIVE MG/DL — SIGNIFICANT CHANGE UP
RBC # BLD: 4.45 M/UL — SIGNIFICANT CHANGE UP (ref 3.8–5.2)
RBC # FLD: 12.8 % — SIGNIFICANT CHANGE UP (ref 10.3–14.5)
SODIUM SERPL-SCNC: 140 MMOL/L — SIGNIFICANT CHANGE UP (ref 135–145)
SP GR SPEC: 1.01 — SIGNIFICANT CHANGE UP (ref 1.01–1.02)
UROBILINOGEN FLD QL: NEGATIVE MG/DL — SIGNIFICANT CHANGE UP
WBC # BLD: 12.3 K/UL — HIGH (ref 3.8–10.5)
WBC # FLD AUTO: 12.3 K/UL — HIGH (ref 3.8–10.5)

## 2020-01-27 PROCEDURE — 96361 HYDRATE IV INFUSION ADD-ON: CPT

## 2020-01-27 PROCEDURE — 96374 THER/PROPH/DIAG INJ IV PUSH: CPT

## 2020-01-27 PROCEDURE — 99284 EMERGENCY DEPT VISIT MOD MDM: CPT

## 2020-01-27 PROCEDURE — 80053 COMPREHEN METABOLIC PANEL: CPT

## 2020-01-27 PROCEDURE — 81001 URINALYSIS AUTO W/SCOPE: CPT

## 2020-01-27 PROCEDURE — 96375 TX/PRO/DX INJ NEW DRUG ADDON: CPT

## 2020-01-27 PROCEDURE — 36415 COLL VENOUS BLD VENIPUNCTURE: CPT

## 2020-01-27 PROCEDURE — 93005 ELECTROCARDIOGRAM TRACING: CPT

## 2020-01-27 PROCEDURE — 87186 SC STD MICRODIL/AGAR DIL: CPT

## 2020-01-27 PROCEDURE — 87040 BLOOD CULTURE FOR BACTERIA: CPT

## 2020-01-27 PROCEDURE — 87086 URINE CULTURE/COLONY COUNT: CPT

## 2020-01-27 PROCEDURE — 85025 COMPLETE CBC W/AUTO DIFF WBC: CPT

## 2020-01-27 PROCEDURE — 99284 EMERGENCY DEPT VISIT MOD MDM: CPT | Mod: 25

## 2020-01-27 PROCEDURE — 93010 ELECTROCARDIOGRAM REPORT: CPT

## 2020-01-27 RX ORDER — CEFTRIAXONE 500 MG/1
1000 INJECTION, POWDER, FOR SOLUTION INTRAMUSCULAR; INTRAVENOUS ONCE
Refills: 0 | Status: DISCONTINUED | OUTPATIENT
Start: 2020-01-27 | End: 2020-01-27

## 2020-01-27 RX ORDER — KETOROLAC TROMETHAMINE 30 MG/ML
30 SYRINGE (ML) INJECTION ONCE
Refills: 0 | Status: DISCONTINUED | OUTPATIENT
Start: 2020-01-27 | End: 2020-01-27

## 2020-01-27 RX ORDER — CEPHALEXIN 500 MG
1 CAPSULE ORAL
Qty: 28 | Refills: 0
Start: 2020-01-27 | End: 2020-02-02

## 2020-01-27 RX ORDER — MORPHINE SULFATE 50 MG/1
4 CAPSULE, EXTENDED RELEASE ORAL ONCE
Refills: 0 | Status: DISCONTINUED | OUTPATIENT
Start: 2020-01-27 | End: 2020-01-27

## 2020-01-27 RX ORDER — SODIUM CHLORIDE 9 MG/ML
2000 INJECTION INTRAMUSCULAR; INTRAVENOUS; SUBCUTANEOUS ONCE
Refills: 0 | Status: COMPLETED | OUTPATIENT
Start: 2020-01-27 | End: 2020-01-27

## 2020-01-27 RX ORDER — CEFTRIAXONE 500 MG/1
1000 INJECTION, POWDER, FOR SOLUTION INTRAMUSCULAR; INTRAVENOUS ONCE
Refills: 0 | Status: COMPLETED | OUTPATIENT
Start: 2020-01-27 | End: 2020-01-27

## 2020-01-27 RX ADMIN — SODIUM CHLORIDE 2000 MILLILITER(S): 9 INJECTION INTRAMUSCULAR; INTRAVENOUS; SUBCUTANEOUS at 09:53

## 2020-01-27 RX ADMIN — CEFTRIAXONE 1000 MILLIGRAM(S): 500 INJECTION, POWDER, FOR SOLUTION INTRAMUSCULAR; INTRAVENOUS at 09:00

## 2020-01-27 RX ADMIN — Medication 30 MILLIGRAM(S): at 09:04

## 2020-01-27 RX ADMIN — SODIUM CHLORIDE 2000 MILLILITER(S): 9 INJECTION INTRAMUSCULAR; INTRAVENOUS; SUBCUTANEOUS at 08:52

## 2020-01-27 RX ADMIN — MORPHINE SULFATE 4 MILLIGRAM(S): 50 CAPSULE, EXTENDED RELEASE ORAL at 10:47

## 2020-01-27 RX ADMIN — Medication 30 MILLIGRAM(S): at 08:51

## 2020-01-27 NOTE — ED PROVIDER NOTE - NSFOLLOWUPINSTRUCTIONS_ED_ALL_ED_FT
please follow up with your doctor this week. please follow up with your doctor this week.  take antibiotics as prescribed  Take tylenol as needed for pain, 650Mg every 6-8 hours.  You can also take ibuprofen as needed for pain, 600mg every 6-8 hours, take with food.

## 2020-01-27 NOTE — ED PROVIDER NOTE - OBJECTIVE STATEMENT
60 y/o female with a PMHx of HTN, HLD, DM presents to the ED c/o left sided back pain x2 weeks. Has been taking Tylenol without improvement. +burning with urination and foul smelling urine x6 days. +urinary frequency. No fever, vomiting, diarrhea, hematuria, vaginal discharge.

## 2020-01-27 NOTE — ED PROVIDER NOTE - NS_ ATTENDINGSCRIBEDETAILS _ED_A_ED_FT
Valarie Larson MD: The history, relevant review of systems, past medical and surgical history, medical decision making, and physical examination was documented by the scribe in my presence and I attest to the accuracy of the documentation.

## 2020-01-27 NOTE — ED ADULT NURSE NOTE - NSIMPLEMENTINTERV_GEN_ALL_ED
Implemented All Universal Safety Interventions:  [place] to call system. Call bell, personal items and telephone within reach. Instruct patient to call for assistance. Room bathroom lighting operational. Non-slip footwear when patient is off stretcher. Physically safe environment: no spills, clutter or unnecessary equipment. Stretcher in lowest position, wheels locked, appropriate side rails in place.

## 2020-01-27 NOTE — ED PROVIDER NOTE - PATIENT PORTAL LINK FT
You can access the FollowMyHealth Patient Portal offered by [facility] by registering at the following website: [email or web address]. By joining [employer]’s FollowMyHealth portal, you will also be able to view your health information using other applications (apps) compatible with our system.

## 2020-01-27 NOTE — ED ADULT NURSE NOTE - OBJECTIVE STATEMENT
pt is 60 yo female with pmhx presents to er for left flank pain and pain with urination for 2 weeks, pt states she has been taking tylenol for 6 days with no relief, last dose of tylenol was last night around 10 PM.  pt also c/o left flank pain travelling to left side of chest.  denies SOB, fever, chills, NVD.

## 2020-01-27 NOTE — ED PROVIDER NOTE - PSH
[place] filter in place  left  H/O tubal ligation    History of cystoscopy  history  nephrolithiasis and recurrent UTI S/P ESWL 5/2016  History of loop recorder    Proliferative diabetic retinopathy of left eye  s/p PPV/laser/silicone  5/31/16 and 11/29/16 OS  Shoulder disorder  left

## 2020-01-27 NOTE — ED PROVIDER NOTE - PROGRESS NOTE DETAILS
Scribe CD for ED attending, Doctor Larson. Pt complaining of continued pain. Will give morphine. Neo: feeling better after pain meds, will dc home.

## 2020-01-27 NOTE — ED ADULT TRIAGE NOTE - CHIEF COMPLAINT QUOTE
pt c/o back, flank and abomdinal pain for 1 week with burning with urination. pt denies fall trauma fever, hx of kidney stones

## 2020-01-27 NOTE — ED ADULT NURSE REASSESSMENT NOTE - NS ED NURSE REASSESS COMMENT FT1
bus pass given to pt, pt ambulates without difficulty on her own, md thompson made aware.  will escort pt to waiting room.

## 2020-01-30 ENCOUNTER — APPOINTMENT (OUTPATIENT)
Dept: RHEUMATOLOGY | Facility: CLINIC | Age: 60
End: 2020-01-30
Payer: MEDICAID

## 2020-01-30 VITALS
BODY MASS INDEX: 30.23 KG/M2 | WEIGHT: 154 LBS | TEMPERATURE: 97.8 F | SYSTOLIC BLOOD PRESSURE: 120 MMHG | HEART RATE: 86 BPM | DIASTOLIC BLOOD PRESSURE: 80 MMHG | OXYGEN SATURATION: 97 % | HEIGHT: 60 IN

## 2020-01-30 DIAGNOSIS — M25.50 PAIN IN UNSPECIFIED JOINT: ICD-10-CM

## 2020-01-30 DIAGNOSIS — R20.0 ANESTHESIA OF SKIN: ICD-10-CM

## 2020-01-30 DIAGNOSIS — M35.00 SICCA SYNDROME, UNSPECIFIED: ICD-10-CM

## 2020-01-30 DIAGNOSIS — Z78.0 ASYMPTOMATIC MENOPAUSAL STATE: ICD-10-CM

## 2020-01-30 DIAGNOSIS — M54.5 LOW BACK PAIN: ICD-10-CM

## 2020-01-30 DIAGNOSIS — R20.2 ANESTHESIA OF SKIN: ICD-10-CM

## 2020-01-30 DIAGNOSIS — M25.569 PAIN IN UNSPECIFIED KNEE: ICD-10-CM

## 2020-01-30 PROCEDURE — 36415 COLL VENOUS BLD VENIPUNCTURE: CPT

## 2020-01-30 PROCEDURE — 99205 OFFICE O/P NEW HI 60 MIN: CPT | Mod: 25

## 2020-01-30 RX ORDER — OMEPRAZOLE, SODIUM BICARBONATE 40; 1100 MG/1; MG/1
CAPSULE ORAL
Refills: 0 | Status: ACTIVE | COMMUNITY

## 2020-01-30 RX ORDER — HYDROCHLOROTHIAZIDE 12.5 MG/1
12.5 TABLET ORAL
Qty: 90 | Refills: 3 | Status: DISCONTINUED | COMMUNITY
Start: 2018-08-17 | End: 2020-01-30

## 2020-01-30 RX ORDER — POTASSIUM CITRATE 10 MEQ/1
10 MEQ TABLET, EXTENDED RELEASE ORAL TWICE DAILY
Qty: 180 | Refills: 0 | Status: DISCONTINUED | COMMUNITY
Start: 2018-07-31 | End: 2020-01-30

## 2020-01-30 RX ORDER — PHENAZOPYRIDINE 200 MG/1
200 TABLET, FILM COATED ORAL 3 TIMES DAILY
Qty: 21 | Refills: 3 | Status: DISCONTINUED | COMMUNITY
Start: 2018-08-10 | End: 2020-01-30

## 2020-01-30 RX ORDER — NITROFURANTOIN MACROCRYSTALS 100 MG/1
100 CAPSULE ORAL 3 TIMES DAILY
Qty: 15 | Refills: 2 | Status: DISCONTINUED | COMMUNITY
Start: 2018-08-10 | End: 2020-01-30

## 2020-01-30 RX ORDER — KETOROLAC TROMETHAMINE 10 MG/1
10 TABLET, FILM COATED ORAL EVERY 6 HOURS
Qty: 20 | Refills: 0 | Status: DISCONTINUED | COMMUNITY
Start: 2018-07-27 | End: 2020-01-30

## 2020-01-30 RX ORDER — POTASSIUM CITRATE 10 MEQ/1
10 MEQ TABLET, EXTENDED RELEASE ORAL TWICE DAILY
Qty: 180 | Refills: 3 | Status: DISCONTINUED | COMMUNITY
Start: 2018-08-17 | End: 2020-01-30

## 2020-01-30 RX ORDER — ALLOPURINOL 100 MG/1
100 TABLET ORAL DAILY
Qty: 90 | Refills: 3 | Status: DISCONTINUED | COMMUNITY
Start: 2018-07-31 | End: 2020-01-30

## 2020-01-30 NOTE — HISTORY OF PRESENT ILLNESS
[FreeTextEntry1] : 59-year-old female here for the first time w/ her son, Jaskaran. Patient states she was recently started on antibiotic for pyelonephritis as she was found to have an infection this past Tuesday at the ER and tends to get recurrent UTIs.\par Patient states she has been noticing pain in both her hands for the past 2 years and more so for the past 6 months. She describes it as achy pain throughout the hands. States she noticed some soreness in her wrists as well. \par States Tylenol helps a little. States she thinks Motrin does not help as much.\par States his history of left hand third digit trigger finger release done May of 2019.\par States she notices some intermittent soreness in her elbows without bursitis.\par States she notes some intermittent achy pain rated 4/10 for severity in her knees w/o swelling or warmth w/ activity/stairs at times and denies any crystal arthropathy like attacks.\par States she notices some lower back pain intermittently. States will monitor to see what makes it worse or better. Denies any loss of bladder or bowel incontinence or saddle anesthesias.\par States she notices some tingling in her arms and feet with history of diabetes on insulin.\par Denies any fever/chills, no rashes, no ulcers, + dry eyes w/ her Optho, Dr. Kane Kaplan; + dry mouth at times, no raynaud's, no GI/ symptoms at this time.\par Reports hx of 1 DVT in 2014 and no other blood clots; no stroke; 5 healthy pregnancies; no miscarriages. \par \par States she is postmenopausal with no recent DEXA will be interested in finding out about her bone health.\par

## 2020-01-30 NOTE — REVIEW OF SYSTEMS
[Joint Pain] : joint pain [Fever] : no fever [Chills] : no chills [Eye Pain] : no eye pain [Red Eyes] : eyes not red [Earache] : no earache [Nosebleeds] : no nosebleeds [Chest Pain] : no chest pain [Shortness Of Breath] : no shortness of breath [Abdominal Pain] : no abdominal pain [Vomiting] : no vomiting [Dysuria] : no dysuria [Skin Lesions] : no skin lesions [Confused] : no confusion [Convulsions] : no convulsions [Suicidal] : not suicidal [Proptosis] : no proptosis [Muscle Weakness] : no muscle weakness [Easy Bleeding] : no tendency for easy bleeding

## 2020-01-30 NOTE — PHYSICAL EXAM
[General Appearance - Alert] : alert [General Appearance - Well Nourished] : well nourished [Sclera] : the sclera and conjunctiva were normal [Extraocular Movements] : extraocular movements were intact [Outer Ear] : the ears and nose were normal in appearance [Nasal Cavity] : the nasal mucosa and septum were normal [Neck Appearance] : the appearance of the neck was normal [Respiration, Rhythm And Depth] : normal respiratory rhythm and effort [Auscultation Breath Sounds / Voice Sounds] : lungs were clear to auscultation bilaterally [Heart Rate And Rhythm] : heart rate was normal and rhythm regular [Heart Sounds] : normal S1 and S2 [Bowel Sounds] : normal bowel sounds [Abdomen Soft] : soft [Abdomen Tenderness] : non-tender [Cervical Lymph Nodes Enlarged Anterior Bilaterally] : anterior cervical [Supraclavicular Lymph Nodes Enlarged Bilaterally] : supraclavicular [No CVA Tenderness] : no ~M costovertebral angle tenderness [Motor Tone] : muscle strength and tone were normal [] : no rash [Cranial Nerves] : cranial nerves 2-12 were intact [No Focal Deficits] : no focal deficits [Impaired Insight] : insight and judgment were intact [Mood] : the mood was normal [FreeTextEntry1] : tenderness in Rt hand 2-3MCP; left hand 2-3MCP; tenderness in Lt wrist w/o effusion; crepitus in knees w/ good ROM w/o erythema or warmth

## 2020-01-30 NOTE — ASSESSMENT
[FreeTextEntry1] : 59-year-old female, here for the first time w/ joint aches throughout the hands/wrists/Lt elbow and sicca symptoms to be evaluated for RA and Sjogren syndrome. States hx of dry eyes on drops w/ her Optho.\par Also reports of recurrent UTIs on antibiotic at this time and will keep seronegative spondyloarthropathy/ reactive arthritis in the differential. \par -today notes pain in b/l MCPs, knees, LBP \par -motrin does not help much and discussed to try Aleve PRN \par -on antibiotic for UTI/ pyelonephritis \par -reviewed labs from [place] w/ 1/27/2020 w/ high WBC=12.30 and ok; CMP ok \par -labs as below incld ESR, CRP, serologies, TSH\par -Referred for xray of b/l hands to evaluate for structural changes, r/o periarticular osteopenia/RA, r/o erosions\par -dry eyes: on drops w/ her Optho she reports\par -dry mouth: biotene mouthwash or toothpaste PRN \par -requested APLS panel to be complete for hx of 1 DVT 2014 w/o other events \par \par LBP: intermittent \par -Referred for xray of LS spine to evaluate for structural changes, DDD, confirm SI normal \par -Motrin PRN w/ food needed sparingly helps\par \par Knee pain -Referred for xray of b/l knees to evaluate for structural changes, OA\par -consider steroid injections \par \par Intermittent numbness/tingling: intermittent in hands and feet. Advised to monitor for CTS symptoms in the hands & has DM on insulin.\par -check metabolic labs incld folate, B12, TSH for it.\par -if persistent can consider EMG w/ Neuro\par \par Bone health: postmenopausal patient reports Dexa >2yrs ago w/ possible hx of osteopenia w/ Dexa referral given today to evaluate for osteopenia/osteoporosis.\par \par -educated on symptoms to monitor for in detail and alert us if any concerns.\par -f/u in 10-14 days w/ labs, xrays, Dexa please\par

## 2020-01-31 LAB
25(OH)D3 SERPL-MCNC: 34.9 NG/ML
BASOPHILS # BLD AUTO: 0.05 K/UL
BASOPHILS NFR BLD AUTO: 0.5 %
CCP AB SER IA-ACNC: <8 UNITS
CK SERPL-CCNC: 106 U/L
CRP SERPL-MCNC: 0.16 MG/DL
ENA RNP AB SER IA-ACNC: <0.2 AL
ENA SM AB SER IA-ACNC: <0.2 AL
ENA SS-A AB SER IA-ACNC: <0.2 AL
ENA SS-B AB SER IA-ACNC: <0.2 AL
EOSINOPHIL # BLD AUTO: 0.37 K/UL
EOSINOPHIL NFR BLD AUTO: 3.8 %
ERYTHROCYTE [SEDIMENTATION RATE] IN BLOOD BY WESTERGREN METHOD: 40 MM/HR
FOLATE SERPL-MCNC: >20 NG/ML
HBV CORE IGM SER QL: NONREACTIVE
HBV SURFACE AG SER QL: NONREACTIVE
HCT VFR BLD CALC: 40.2 %
HCV AB SER QL: NONREACTIVE
HCV S/CO RATIO: 0.14 S/CO
HGB BLD-MCNC: 13 G/DL
IMM GRANULOCYTES NFR BLD AUTO: 0.3 %
LYMPHOCYTES # BLD AUTO: 4.1 K/UL
LYMPHOCYTES NFR BLD AUTO: 41.9 %
MAN DIFF?: NORMAL
MCHC RBC-ENTMCNC: 30 PG
MCHC RBC-ENTMCNC: 32.3 GM/DL
MCV RBC AUTO: 92.6 FL
MONOCYTES # BLD AUTO: 0.45 K/UL
MONOCYTES NFR BLD AUTO: 4.6 %
NEUTROPHILS # BLD AUTO: 4.78 K/UL
NEUTROPHILS NFR BLD AUTO: 48.9 %
PLATELET # BLD AUTO: 352 K/UL
RBC # BLD: 4.34 M/UL
RBC # FLD: 12.9 %
RF+CCP IGG SER-IMP: NEGATIVE
RHEUMATOID FACT SER QL: 11 IU/ML
TSH SERPL-ACNC: 2.47 UIU/ML
VIT B12 SERPL-MCNC: 1322 PG/ML
WBC # FLD AUTO: 9.78 K/UL

## 2020-02-01 LAB
CULTURE RESULTS: SIGNIFICANT CHANGE UP
CULTURE RESULTS: SIGNIFICANT CHANGE UP
G6PD SER-CCNC: 14.5 U/G HGB
SPECIMEN SOURCE: SIGNIFICANT CHANGE UP
SPECIMEN SOURCE: SIGNIFICANT CHANGE UP

## 2020-02-02 ENCOUNTER — FORM ENCOUNTER (OUTPATIENT)
Age: 60
End: 2020-02-02

## 2020-02-03 ENCOUNTER — APPOINTMENT (OUTPATIENT)
Dept: RADIOLOGY | Facility: CLINIC | Age: 60
End: 2020-02-03
Payer: SELF-PAY

## 2020-02-03 ENCOUNTER — OUTPATIENT (OUTPATIENT)
Dept: OUTPATIENT SERVICES | Facility: HOSPITAL | Age: 60
LOS: 1 days | End: 2020-02-03
Payer: MEDICAID

## 2020-02-03 DIAGNOSIS — Z98.51 TUBAL LIGATION STATUS: Chronic | ICD-10-CM

## 2020-02-03 DIAGNOSIS — Z98.890 OTHER SPECIFIED POSTPROCEDURAL STATES: Chronic | ICD-10-CM

## 2020-02-03 DIAGNOSIS — Z78.0 ASYMPTOMATIC MENOPAUSAL STATE: ICD-10-CM

## 2020-02-03 DIAGNOSIS — M19.049 PRIMARY OSTEOARTHRITIS, UNSPECIFIED HAND: ICD-10-CM

## 2020-02-03 DIAGNOSIS — M25.9 JOINT DISORDER, UNSPECIFIED: Chronic | ICD-10-CM

## 2020-02-03 DIAGNOSIS — Z95.828 PRESENCE OF OTHER VASCULAR IMPLANTS AND GRAFTS: Chronic | ICD-10-CM

## 2020-02-03 DIAGNOSIS — E11.3592 TYPE 2 DIABETES MELLITUS WITH PROLIFERATIVE DIABETIC RETINOPATHY WITHOUT MACULAR EDEMA, LEFT EYE: Chronic | ICD-10-CM

## 2020-02-03 DIAGNOSIS — M54.5 LOW BACK PAIN: ICD-10-CM

## 2020-02-03 DIAGNOSIS — M25.569 PAIN IN UNSPECIFIED KNEE: ICD-10-CM

## 2020-02-03 LAB
ANA SER IF-ACNC: NEGATIVE
HLA-B27 RELATED AG QL: NORMAL

## 2020-02-03 PROCEDURE — 77080 DXA BONE DENSITY AXIAL: CPT

## 2020-02-03 PROCEDURE — 73120 X-RAY EXAM OF HAND: CPT | Mod: 26,50

## 2020-02-03 PROCEDURE — 73565 X-RAY EXAM OF KNEES: CPT | Mod: 26

## 2020-02-03 PROCEDURE — 72110 X-RAY EXAM L-2 SPINE 4/>VWS: CPT | Mod: 26

## 2020-02-03 PROCEDURE — 73120 X-RAY EXAM OF HAND: CPT

## 2020-02-03 PROCEDURE — 77080 DXA BONE DENSITY AXIAL: CPT | Mod: 26

## 2020-02-03 PROCEDURE — 73565 X-RAY EXAM OF KNEES: CPT

## 2020-02-03 PROCEDURE — 72110 X-RAY EXAM L-2 SPINE 4/>VWS: CPT

## 2020-02-05 LAB
B BURGDOR AB SER-IMP: NEGATIVE
B BURGDOR IGM PATRN SER IB-IMP: NEGATIVE
B BURGDOR18KD IGG SER QL IB: NORMAL
B BURGDOR23KD IGG SER QL IB: NORMAL
B BURGDOR23KD IGM SER QL IB: NORMAL
B BURGDOR28KD IGG SER QL IB: NORMAL
B BURGDOR30KD IGG SER QL IB: NORMAL
B BURGDOR31KD IGG SER QL IB: NORMAL
B BURGDOR39KD IGG SER QL IB: NORMAL
B BURGDOR39KD IGM SER QL IB: NORMAL
B BURGDOR41KD IGG SER QL IB: NORMAL
B BURGDOR41KD IGM SER QL IB: NORMAL
B BURGDOR45KD IGG SER QL IB: NORMAL
B BURGDOR58KD IGG SER QL IB: NORMAL
B BURGDOR66KD IGG SER QL IB: NORMAL
B BURGDOR93KD IGG SER QL IB: NORMAL
B19V IGG SER QL IA: 0.2 INDEX
B19V IGG+IGM SER-IMP: NEGATIVE
B19V IGG+IGM SER-IMP: NORMAL
B19V IGM FLD-ACNC: 0.2
B19V IGM SER-ACNC: NEGATIVE
B2 GLYCOPROT1 IGG SER-ACNC: <5 SGU
B2 GLYCOPROT1 IGM SER-ACNC: >150 SMU
CARDIOLIPIN IGM SER-MCNC: 8.3 MPL
CARDIOLIPIN IGM SER-MCNC: <5 GPL

## 2020-02-19 ENCOUNTER — LABORATORY RESULT (OUTPATIENT)
Age: 60
End: 2020-02-19

## 2020-02-19 ENCOUNTER — APPOINTMENT (OUTPATIENT)
Dept: RHEUMATOLOGY | Facility: CLINIC | Age: 60
End: 2020-02-19
Payer: MEDICAID

## 2020-02-19 VITALS
SYSTOLIC BLOOD PRESSURE: 120 MMHG | WEIGHT: 154 LBS | HEIGHT: 60 IN | DIASTOLIC BLOOD PRESSURE: 80 MMHG | OXYGEN SATURATION: 96 % | TEMPERATURE: 98 F | HEART RATE: 81 BPM | BODY MASS INDEX: 30.23 KG/M2

## 2020-02-19 DIAGNOSIS — Z71.2 PERSON CONSULTING FOR EXPLANATION OF EXAMINATION OR TEST FINDINGS: ICD-10-CM

## 2020-02-19 DIAGNOSIS — R35.0 FREQUENCY OF MICTURITION: ICD-10-CM

## 2020-02-19 PROCEDURE — 99215 OFFICE O/P EST HI 40 MIN: CPT

## 2020-02-19 RX ORDER — ASPIRIN 81 MG/1
81 TABLET, DELAYED RELEASE ORAL DAILY
Qty: 30 | Refills: 1 | Status: ACTIVE | COMMUNITY
Start: 2020-02-19 | End: 1900-01-01

## 2020-02-19 NOTE — ASSESSMENT
[FreeTextEntry1] : 59-year-old female, here for the first follow up w/ joint aches throughout the hands/MCPs/ PIPs/ bilaterally w/ synovitis w/ xray of b/l hands reading small well marginated erosion along proximal ulnar articular margin of 3rd DIP joint w/ Seronegative arthritis w/ raised ESR w/ concern for reactive arthritis given hx of recurrent UTIs. \par Will keep seronegative RA in the differential w/ hx of dry eyes on drops w/ her Optho though DIP erosion not very typical w/ it.\par \par Seronegative Arthritis: \par -reviewed labs 1/31/2020 w/ high ESR= 40 with normal serologies \par -hx of recurrent UTIs so defers MTX for now w/ risk of infection and discussed to treat UTIs immediately w/ PCP and urologist as discussed in detail\par -discussed r/b/s of Sulfasalazine 500mg 1 tab weekly and if no rash then 1 tab PO BID w/ G6PD NL w/ pt agreeable and prescription sent as below\par -Reviewed xray b/l hands 2/3/2020 reading small well marginated erosion along proximal ulnar articular margin of 3rd DIP joint\par -dry eyes: on drops w/ her Optho she reports\par -dry mouth: biotene mouthwash or toothpaste PRN \par \par Urinary frequency: UA w/ culture done today to evaluate for UTI and call for results \par -states off antibiotics after treated for pyelonephritis last visit & no CVA tenderness at this time \par \par 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 and + [id number] IgM >150 high titer; LAC requested again since not done\par -discussed r/b/s of ASA 81mg daily w/ pt agreeable and prescription sent as below\par -referred to Heme to discuss if needs AC and need to continue ASA\par -discussed APLS panel should be repeated in 3mo\par -GUILLERMINA w/ IF neg w/o clinical symptoms of SLE at this time   \par \par LBP: intermittent \par -Reviewed xray LS spine 2/3/2020 w/ DDD; SI normal \par -Motrin PRN w/ food needed sparingly helps\par \par Knee OA: reviewed xray b/l knees 2/3/2020 read as OA chagnes Rt knee\par -discussed she can consider steroid injection as needed \par \par Intermittent numbness/tingling: intermittent in hands and feet likely 2/2 to DM on insulin.\par -discussed metabolic labs folate, B12, TSH normal for it.\par -discussed she can consider gabapentin for it and states she will think about it for f/u and wants to start w/ few medications at a time\par -if persistent can consider EMG w/ Neuro\par \par Osteoporosis: on Dexa 2/3/2020 w/ worse t-score -3.1 at spine\par -postmenopausal, no  fractures; FH of it in sister\par -defers Fosamax bc states she has GERD and worries about compliance w/ bisphosphate \par -discussed r/b/s of Prolia subQ q 6mo w/ pt agreeable and prescription sent for prior auth\par -discussed Ca+vitD supplementation in detail today\par -encouraged weight bearing exercises as tolerated.\par \par -educated on symptoms to monitor for in detail and alert us if any concerns.\par -f/u in 4-6 weeks w/ labs and for Prolia or sooner as needed

## 2020-02-19 NOTE — REASON FOR VISIT
[Follow-Up: _____] : a [unfilled] follow-up visit [FreeTextEntry1] : joint aches; review labs/ xrays/ meds; review Dexa w/ Osteoporosis new diagnosis/ meds; urinary frequency; + [id number] IGM

## 2020-02-19 NOTE — PHYSICAL EXAM
[General Appearance - Alert] : alert [General Appearance - Well Nourished] : well nourished [Sclera] : the sclera and conjunctiva were normal [Extraocular Movements] : extraocular movements were intact [Nasal Cavity] : the nasal mucosa and septum were normal [Outer Ear] : the ears and nose were normal in appearance [Respiration, Rhythm And Depth] : normal respiratory rhythm and effort [Neck Appearance] : the appearance of the neck was normal [Heart Rate And Rhythm] : heart rate was normal and rhythm regular [Auscultation Breath Sounds / Voice Sounds] : lungs were clear to auscultation bilaterally [Heart Sounds] : normal S1 and S2 [Bowel Sounds] : normal bowel sounds [Cervical Lymph Nodes Enlarged Anterior Bilaterally] : anterior cervical [Abdomen Tenderness] : non-tender [Abdomen Soft] : soft [Supraclavicular Lymph Nodes Enlarged Bilaterally] : supraclavicular [No CVA Tenderness] : no ~M costovertebral angle tenderness [] : no rash [Motor Tone] : muscle strength and tone were normal [Cranial Nerves] : cranial nerves 2-12 were intact [Impaired Insight] : insight and judgment were intact [Mood] : the mood was normal [No Focal Deficits] : no focal deficits [FreeTextEntry1] : no SI tenderness w/ good ROM

## 2020-02-19 NOTE — HISTORY OF PRESENT ILLNESS
[FreeTextEntry1] : 59-year-old female here for the first follow up w/ her daughter, Allyson. \par Patient states she did labs, xrays and Dexa to review in detail today w/ medications. \par Today she states she notes some increased urinary frequency and requests urine culture w/ hx of UTIs. \par Patient states she has been noticing pain in both her hands for the past 2 years and more so for the past > 6 months. She describes it as achy pain throughout the hands. \par States Tylenol and Motrin do not help for long enough. \par States she has history of left hand third digit trigger finger release done May of 2019.\par States she notices some intermittent soreness in her elbows without bursitis.\par States she notes some intermittent achy pain rated 4/10 for severity in her knees w/o swelling or warmth w/ activity/stairs at times and denies any crystal arthropathy like attacks.\par States she notices some lower back pain intermittently. States will monitor to see what makes it worse or better. Denies any loss of bladder or bowel incontinence or saddle anesthesias.\par States she notices some tingling in her arms and feet with history of diabetes on insulin.\par Denies any fever/chills, no rashes, no ulcers, + dry eyes w/ her Optho, Dr. Kane Kaplan; + dry mouth at times, no raynaud's, no GI/ symptoms at this time.\par Reports hx of 1 DVT around 2014 w/ PE and no other blood clots; no stroke; 5 healthy pregnancies; no miscarriages.\par \par She is postmenopausal, no prior fractures and has  FH of osteoporosis in her sister. States she does have GERD so defers PO bisphosphonates. \par

## 2020-02-19 NOTE — REVIEW OF SYSTEMS
[As Noted in HPI] : as noted in HPI [Joint Pain] : joint pain [Fever] : no fever [Red Eyes] : eyes not red [Chills] : no chills [Eye Pain] : no eye pain [Chest Pain] : no chest pain [Nosebleeds] : no nosebleeds [Earache] : no earache [Shortness Of Breath] : no shortness of breath [Abdominal Pain] : no abdominal pain [Vomiting] : no vomiting [Skin Lesions] : no skin lesions [Suicidal] : not suicidal [Convulsions] : no convulsions [Confused] : no confusion [Muscle Weakness] : no muscle weakness [Proptosis] : no proptosis [Easy Bleeding] : no tendency for easy bleeding

## 2020-02-20 LAB
APPEARANCE: CLEAR
BILIRUBIN URINE: NEGATIVE
BLOOD URINE: NEGATIVE
COLOR: NORMAL
GLUCOSE QUALITATIVE U: NEGATIVE
KETONES URINE: NEGATIVE
LEUKOCYTE ESTERASE URINE: ABNORMAL
NITRITE URINE: NEGATIVE
PH URINE: 7
PROTEIN URINE: NEGATIVE
SPECIFIC GRAVITY URINE: 1.01
UROBILINOGEN URINE: NORMAL

## 2020-03-21 ENCOUNTER — TRANSCRIPTION ENCOUNTER (OUTPATIENT)
Age: 60
End: 2020-03-21

## 2020-03-25 ENCOUNTER — EMERGENCY (EMERGENCY)
Facility: HOSPITAL | Age: 60
LOS: 0 days | Discharge: HOME | End: 2020-03-25
Attending: EMERGENCY MEDICINE
Payer: MEDICAID

## 2020-03-25 VITALS — HEIGHT: 62 IN | WEIGHT: 149.91 LBS

## 2020-03-25 VITALS
SYSTOLIC BLOOD PRESSURE: 128 MMHG | HEART RATE: 9 BPM | OXYGEN SATURATION: 99 % | DIASTOLIC BLOOD PRESSURE: 71 MMHG | TEMPERATURE: 100 F | RESPIRATION RATE: 15 BRPM

## 2020-03-25 DIAGNOSIS — E78.5 HYPERLIPIDEMIA, UNSPECIFIED: ICD-10-CM

## 2020-03-25 DIAGNOSIS — Z95.828 PRESENCE OF OTHER VASCULAR IMPLANTS AND GRAFTS: Chronic | ICD-10-CM

## 2020-03-25 DIAGNOSIS — Z86.73 PERSONAL HISTORY OF TRANSIENT ISCHEMIC ATTACK (TIA), AND CEREBRAL INFARCTION WITHOUT RESIDUAL DEFICITS: ICD-10-CM

## 2020-03-25 DIAGNOSIS — R05 COUGH: ICD-10-CM

## 2020-03-25 DIAGNOSIS — E11.3592 TYPE 2 DIABETES MELLITUS WITH PROLIFERATIVE DIABETIC RETINOPATHY WITHOUT MACULAR EDEMA, LEFT EYE: Chronic | ICD-10-CM

## 2020-03-25 DIAGNOSIS — Z86.711 PERSONAL HISTORY OF PULMONARY EMBOLISM: ICD-10-CM

## 2020-03-25 DIAGNOSIS — K21.9 GASTRO-ESOPHAGEAL REFLUX DISEASE WITHOUT ESOPHAGITIS: ICD-10-CM

## 2020-03-25 DIAGNOSIS — E11.9 TYPE 2 DIABETES MELLITUS WITHOUT COMPLICATIONS: ICD-10-CM

## 2020-03-25 DIAGNOSIS — N39.0 URINARY TRACT INFECTION, SITE NOT SPECIFIED: ICD-10-CM

## 2020-03-25 DIAGNOSIS — R11.2 NAUSEA WITH VOMITING, UNSPECIFIED: ICD-10-CM

## 2020-03-25 DIAGNOSIS — B97.29 OTHER CORONAVIRUS AS THE CAUSE OF DISEASES CLASSIFIED ELSEWHERE: ICD-10-CM

## 2020-03-25 DIAGNOSIS — R50.9 FEVER, UNSPECIFIED: ICD-10-CM

## 2020-03-25 DIAGNOSIS — M25.9 JOINT DISORDER, UNSPECIFIED: Chronic | ICD-10-CM

## 2020-03-25 DIAGNOSIS — Z87.440 PERSONAL HISTORY OF URINARY (TRACT) INFECTIONS: ICD-10-CM

## 2020-03-25 DIAGNOSIS — R30.0 DYSURIA: ICD-10-CM

## 2020-03-25 DIAGNOSIS — I10 ESSENTIAL (PRIMARY) HYPERTENSION: ICD-10-CM

## 2020-03-25 DIAGNOSIS — Z98.890 OTHER SPECIFIED POSTPROCEDURAL STATES: Chronic | ICD-10-CM

## 2020-03-25 DIAGNOSIS — Z98.51 TUBAL LIGATION STATUS: Chronic | ICD-10-CM

## 2020-03-25 DIAGNOSIS — Z88.5 ALLERGY STATUS TO NARCOTIC AGENT: ICD-10-CM

## 2020-03-25 DIAGNOSIS — Z79.4 LONG TERM (CURRENT) USE OF INSULIN: ICD-10-CM

## 2020-03-25 DIAGNOSIS — Z98.51 TUBAL LIGATION STATUS: ICD-10-CM

## 2020-03-25 DIAGNOSIS — Z88.0 ALLERGY STATUS TO PENICILLIN: ICD-10-CM

## 2020-03-25 DIAGNOSIS — Z86.718 PERSONAL HISTORY OF OTHER VENOUS THROMBOSIS AND EMBOLISM: ICD-10-CM

## 2020-03-25 DIAGNOSIS — F41.9 ANXIETY DISORDER, UNSPECIFIED: ICD-10-CM

## 2020-03-25 DIAGNOSIS — Z87.442 PERSONAL HISTORY OF URINARY CALCULI: ICD-10-CM

## 2020-03-25 LAB
ALBUMIN SERPL ELPH-MCNC: 3.2 G/DL — LOW (ref 3.3–5)
ALP SERPL-CCNC: 126 U/L — HIGH (ref 40–120)
ALT FLD-CCNC: 24 U/L — SIGNIFICANT CHANGE UP (ref 12–78)
ANION GAP SERPL CALC-SCNC: 6 MMOL/L — SIGNIFICANT CHANGE UP (ref 5–17)
APPEARANCE UR: CLEAR — SIGNIFICANT CHANGE UP
AST SERPL-CCNC: 27 U/L — SIGNIFICANT CHANGE UP (ref 15–37)
BASOPHILS # BLD AUTO: 0.02 K/UL — SIGNIFICANT CHANGE UP (ref 0–0.2)
BASOPHILS NFR BLD AUTO: 0.2 % — SIGNIFICANT CHANGE UP (ref 0–2)
BILIRUB SERPL-MCNC: 0.3 MG/DL — SIGNIFICANT CHANGE UP (ref 0.2–1.2)
BILIRUB UR-MCNC: NEGATIVE — SIGNIFICANT CHANGE UP
BUN SERPL-MCNC: 7 MG/DL — SIGNIFICANT CHANGE UP (ref 7–23)
CALCIUM SERPL-MCNC: 8.6 MG/DL — SIGNIFICANT CHANGE UP (ref 8.5–10.1)
CHLORIDE SERPL-SCNC: 98 MMOL/L — SIGNIFICANT CHANGE UP (ref 96–108)
CO2 SERPL-SCNC: 28 MMOL/L — SIGNIFICANT CHANGE UP (ref 22–31)
COLOR SPEC: YELLOW — SIGNIFICANT CHANGE UP
CREAT SERPL-MCNC: 0.93 MG/DL — SIGNIFICANT CHANGE UP (ref 0.5–1.3)
DIFF PNL FLD: NEGATIVE — SIGNIFICANT CHANGE UP
EOSINOPHIL # BLD AUTO: 0.1 K/UL — SIGNIFICANT CHANGE UP (ref 0–0.5)
EOSINOPHIL NFR BLD AUTO: 1 % — SIGNIFICANT CHANGE UP (ref 0–6)
GLUCOSE SERPL-MCNC: 258 MG/DL — HIGH (ref 70–99)
GLUCOSE UR QL: 1000 MG/DL
HCT VFR BLD CALC: 37.4 % — SIGNIFICANT CHANGE UP (ref 34.5–45)
HGB BLD-MCNC: 12.7 G/DL — SIGNIFICANT CHANGE UP (ref 11.5–15.5)
IMM GRANULOCYTES NFR BLD AUTO: 0.3 % — SIGNIFICANT CHANGE UP (ref 0–1.5)
KETONES UR-MCNC: NEGATIVE — SIGNIFICANT CHANGE UP
LEUKOCYTE ESTERASE UR-ACNC: NEGATIVE — SIGNIFICANT CHANGE UP
LYMPHOCYTES # BLD AUTO: 2.73 K/UL — SIGNIFICANT CHANGE UP (ref 1–3.3)
LYMPHOCYTES # BLD AUTO: 27.9 % — SIGNIFICANT CHANGE UP (ref 13–44)
MCHC RBC-ENTMCNC: 29.5 PG — SIGNIFICANT CHANGE UP (ref 27–34)
MCHC RBC-ENTMCNC: 34 GM/DL — SIGNIFICANT CHANGE UP (ref 32–36)
MCV RBC AUTO: 87 FL — SIGNIFICANT CHANGE UP (ref 80–100)
MONOCYTES # BLD AUTO: 0.73 K/UL — SIGNIFICANT CHANGE UP (ref 0–0.9)
MONOCYTES NFR BLD AUTO: 7.5 % — SIGNIFICANT CHANGE UP (ref 2–14)
NEUTROPHILS # BLD AUTO: 6.18 K/UL — SIGNIFICANT CHANGE UP (ref 1.8–7.4)
NEUTROPHILS NFR BLD AUTO: 63.1 % — SIGNIFICANT CHANGE UP (ref 43–77)
NITRITE UR-MCNC: NEGATIVE — SIGNIFICANT CHANGE UP
PH UR: 7 — SIGNIFICANT CHANGE UP (ref 5–8)
PLATELET # BLD AUTO: 250 K/UL — SIGNIFICANT CHANGE UP (ref 150–400)
POTASSIUM SERPL-MCNC: 3.7 MMOL/L — SIGNIFICANT CHANGE UP (ref 3.5–5.3)
POTASSIUM SERPL-SCNC: 3.7 MMOL/L — SIGNIFICANT CHANGE UP (ref 3.5–5.3)
PROT SERPL-MCNC: 7.5 GM/DL — SIGNIFICANT CHANGE UP (ref 6–8.3)
PROT UR-MCNC: 100 MG/DL
RBC # BLD: 4.3 M/UL — SIGNIFICANT CHANGE UP (ref 3.8–5.2)
RBC # FLD: 13.2 % — SIGNIFICANT CHANGE UP (ref 10.3–14.5)
SODIUM SERPL-SCNC: 132 MMOL/L — LOW (ref 135–145)
SP GR SPEC: 1 — LOW (ref 1.01–1.02)
UROBILINOGEN FLD QL: NEGATIVE MG/DL — SIGNIFICANT CHANGE UP
WBC # BLD: 9.79 K/UL — SIGNIFICANT CHANGE UP (ref 3.8–10.5)
WBC # FLD AUTO: 9.79 K/UL — SIGNIFICANT CHANGE UP (ref 3.8–10.5)

## 2020-03-25 PROCEDURE — 81001 URINALYSIS AUTO W/SCOPE: CPT

## 2020-03-25 PROCEDURE — 87086 URINE CULTURE/COLONY COUNT: CPT

## 2020-03-25 PROCEDURE — 87635 SARS-COV-2 COVID-19 AMP PRB: CPT

## 2020-03-25 PROCEDURE — 71045 X-RAY EXAM CHEST 1 VIEW: CPT | Mod: 26

## 2020-03-25 PROCEDURE — 85025 COMPLETE CBC W/AUTO DIFF WBC: CPT

## 2020-03-25 PROCEDURE — 80053 COMPREHEN METABOLIC PANEL: CPT

## 2020-03-25 PROCEDURE — 96374 THER/PROPH/DIAG INJ IV PUSH: CPT

## 2020-03-25 PROCEDURE — 71045 X-RAY EXAM CHEST 1 VIEW: CPT

## 2020-03-25 PROCEDURE — 36415 COLL VENOUS BLD VENIPUNCTURE: CPT

## 2020-03-25 PROCEDURE — 99284 EMERGENCY DEPT VISIT MOD MDM: CPT

## 2020-03-25 PROCEDURE — 99284 EMERGENCY DEPT VISIT MOD MDM: CPT | Mod: 25

## 2020-03-25 PROCEDURE — 82962 GLUCOSE BLOOD TEST: CPT

## 2020-03-25 RX ORDER — NITROFURANTOIN MACROCRYSTAL 50 MG
1 CAPSULE ORAL
Qty: 14 | Refills: 0
Start: 2020-03-25 | End: 2020-03-31

## 2020-03-25 RX ORDER — ONDANSETRON 8 MG/1
4 TABLET, FILM COATED ORAL ONCE
Refills: 0 | Status: COMPLETED | OUTPATIENT
Start: 2020-03-25 | End: 2020-03-25

## 2020-03-25 RX ORDER — SODIUM CHLORIDE 9 MG/ML
1000 INJECTION INTRAMUSCULAR; INTRAVENOUS; SUBCUTANEOUS ONCE
Refills: 0 | Status: COMPLETED | OUTPATIENT
Start: 2020-03-25 | End: 2020-03-25

## 2020-03-25 RX ORDER — ACETAMINOPHEN 500 MG
650 TABLET ORAL ONCE
Refills: 0 | Status: COMPLETED | OUTPATIENT
Start: 2020-03-25 | End: 2020-03-25

## 2020-03-25 RX ADMIN — Medication 650 MILLIGRAM(S): at 17:21

## 2020-03-25 RX ADMIN — SODIUM CHLORIDE 1000 MILLILITER(S): 9 INJECTION INTRAMUSCULAR; INTRAVENOUS; SUBCUTANEOUS at 17:21

## 2020-03-25 RX ADMIN — ONDANSETRON 4 MILLIGRAM(S): 8 TABLET, FILM COATED ORAL at 17:21

## 2020-03-25 NOTE — ED STATDOCS - PROGRESS NOTE DETAILS
59 y/o female with PMHx of DVT, PE, TIA, HTN, HLD, DM2, GERD, anxiety, s/p IVC filter, and s/p loop recorder presents to the ED c/o intermittent +fevers x7 days. Endorses associated +dry cough, +body aches, and +N/V. Also notes +chills and +dysuria. Reports multiple family members sick with same symptoms. Plan: labs, covid testing, fluids and reeval. -Althea Mcadams PA-C pt aware of labs and cxr. pt aware to take antibiotics for symptoms. pt well appearing on dc and agrees with plan. -Althea Mcadams PA-C

## 2020-03-25 NOTE — ED ADULT NURSE NOTE - OBJECTIVE STATEMENT
Pt presents to the ED for evaluation of suprapubic pain and abdominal pain worsened when she pees. Pt tearful in ED.

## 2020-03-25 NOTE — ED STATDOCS - OBJECTIVE STATEMENT
61 y/o female with PMHx of DVT, PE, TIA, HTN, HLD, DM2, GERD, anxiety, s/p IVC filter, and s/p loop recorder presents to the ED c/o intermittent +fevers x7 days. Endorses associated +dry cough, +body aches, and +N/V. Also notes +chills and +dysuria. Reports multiple family members sick with same symptoms. Allergic to oxycodone and penicillin. PCP: Dr. Cesar Castro

## 2020-03-25 NOTE — ED STATDOCS - PATIENT PORTAL LINK FT
You can access the FollowMyHealth Patient Portal offered by [facility] by registering at the following website: [email or web address]. By joining [facility]’s FollowMyHealth portal, you will also be able to view your health information using other applications (apps) compatible with our system.

## 2020-03-25 NOTE — ED STATDOCS - NSFOLLOWUPINSTRUCTIONS_ED_ALL_ED_FT
Urinary Tract Infection    A urinary tract infection (UTI) is an infection of any part of the urinary tract, which includes the kidneys, ureters, bladder, and urethra. Risk factors include ignoring your need to urinate, wiping back to front if female, being an uncircumcised male, and having diabetes or a weak immune system. Symptoms include frequent urination, pain or burning with urination, foul smelling urine, cloudy urine, pain in the lower abdomen, blood in the urine, and fever. If you were prescribed an antibiotic medicine, take it as told by your health care provider. Do not stop taking the antibiotic even if you start to feel better.    SEEK IMMEDIATE MEDICAL CARE IF YOU HAVE ANY OF THE FOLLOWING SYMPTOMS: severe back or abdominal pain, fever, inability to keep fluids or medicine down, dizziness/lightheadedness, or a change in mental status.    Viral Respiratory Infection    A viral respiratory infection is an illness that affects parts of the body used for breathing, like the lungs, nose, and throat. It is caused by a germ called a virus. Symptoms can include runny nose, coughing, sneezing, fatigue, body aches, sore throat, fever, or headache. Over the counter medicine can be used to manage the symptoms but the infection typically goes away on its own in 5 to 10 days.     SEEK IMMEDIATE MEDICAL CARE IF YOU HAVE ANY OF THE FOLLOWING SYMPTOMS: shortness of breath, chest pain, fever over 10 days, or lightheadedness/dizziness.

## 2020-03-25 NOTE — ED STATDOCS - CLINICAL SUMMARY MEDICAL DECISION MAKING FREE TEXT BOX
Pt with UTI.  CXR clear, labs otherwise normal.  D/c home with antibiotics.  Return precautions given.

## 2020-03-25 NOTE — ED STATDOCS - PSH
[place] filter in place  left  H/O tubal ligation    History of cystoscopy  history  nephrolithiasis and recurrent UTI S/P ESWL 5/2016  History of loop recorder    Proliferative diabetic retinopathy of left eye  s/p PPV/laser/silicone  5/31/16 and 11/29/16 OS  Shoulder disorder  left

## 2020-03-25 NOTE — ED STATDOCS - PMH
Anxiety    Constipation    DJD (degenerative joint disease)    Dvt femoral (deep venous thrombosis)  left leg in 2014  GERD (gastroesophageal reflux disease)    History of TIA (transient ischemic attack) and stroke    HLD (hyperlipidemia)    Hypertension    Pulmonary embolism    Syncope    Type 2 diabetes mellitus    Vitamin D deficiency

## 2020-03-26 LAB
CULTURE RESULTS: SIGNIFICANT CHANGE UP
SARS-COV-2 RNA SPEC QL NAA+PROBE: DETECTED
SPECIMEN SOURCE: SIGNIFICANT CHANGE UP

## 2020-03-28 ENCOUNTER — INPATIENT (INPATIENT)
Facility: HOSPITAL | Age: 60
LOS: 3 days | Discharge: HOME | DRG: 871 | End: 2020-04-01
Attending: HOSPITALIST | Admitting: HOSPITALIST
Payer: MEDICAID

## 2020-03-28 VITALS
SYSTOLIC BLOOD PRESSURE: 163 MMHG | OXYGEN SATURATION: 95 % | WEIGHT: 154.1 LBS | DIASTOLIC BLOOD PRESSURE: 76 MMHG | RESPIRATION RATE: 22 BRPM | TEMPERATURE: 100 F | HEART RATE: 119 BPM

## 2020-03-28 DIAGNOSIS — Z98.890 OTHER SPECIFIED POSTPROCEDURAL STATES: Chronic | ICD-10-CM

## 2020-03-28 DIAGNOSIS — Z98.51 TUBAL LIGATION STATUS: Chronic | ICD-10-CM

## 2020-03-28 DIAGNOSIS — B97.21 SARS-ASSOCIATED CORONAVIRUS AS THE CAUSE OF DISEASES CLASSIFIED ELSEWHERE: ICD-10-CM

## 2020-03-28 DIAGNOSIS — B34.2 CORONAVIRUS INFECTION, UNSPECIFIED: ICD-10-CM

## 2020-03-28 DIAGNOSIS — Z95.828 PRESENCE OF OTHER VASCULAR IMPLANTS AND GRAFTS: Chronic | ICD-10-CM

## 2020-03-28 DIAGNOSIS — M25.9 JOINT DISORDER, UNSPECIFIED: Chronic | ICD-10-CM

## 2020-03-28 DIAGNOSIS — E11.3592 TYPE 2 DIABETES MELLITUS WITH PROLIFERATIVE DIABETIC RETINOPATHY WITHOUT MACULAR EDEMA, LEFT EYE: Chronic | ICD-10-CM

## 2020-03-28 LAB
ALBUMIN SERPL ELPH-MCNC: 2.7 G/DL — LOW (ref 3.3–5)
ALP SERPL-CCNC: 113 U/L — SIGNIFICANT CHANGE UP (ref 30–120)
ALT FLD-CCNC: 29 U/L DA — SIGNIFICANT CHANGE UP (ref 10–60)
ANION GAP SERPL CALC-SCNC: 11 MMOL/L — SIGNIFICANT CHANGE UP (ref 5–17)
APPEARANCE UR: CLEAR — SIGNIFICANT CHANGE UP
APTT BLD: 26.5 SEC — LOW (ref 28.5–37)
AST SERPL-CCNC: 38 U/L — SIGNIFICANT CHANGE UP (ref 10–40)
BACTERIA # UR AUTO: ABNORMAL
BASOPHILS # BLD AUTO: 0.02 K/UL — SIGNIFICANT CHANGE UP (ref 0–0.2)
BASOPHILS NFR BLD AUTO: 0.1 % — SIGNIFICANT CHANGE UP (ref 0–2)
BILIRUB SERPL-MCNC: 0.4 MG/DL — SIGNIFICANT CHANGE UP (ref 0.2–1.2)
BILIRUB UR-MCNC: NEGATIVE — SIGNIFICANT CHANGE UP
BUN SERPL-MCNC: 8 MG/DL — SIGNIFICANT CHANGE UP (ref 7–23)
CALCIUM SERPL-MCNC: 8.3 MG/DL — LOW (ref 8.4–10.5)
CHLORIDE SERPL-SCNC: 97 MMOL/L — SIGNIFICANT CHANGE UP (ref 96–108)
CO2 SERPL-SCNC: 24 MMOL/L — SIGNIFICANT CHANGE UP (ref 22–31)
COLOR SPEC: YELLOW — SIGNIFICANT CHANGE UP
CREAT SERPL-MCNC: 0.97 MG/DL — SIGNIFICANT CHANGE UP (ref 0.5–1.3)
DIFF PNL FLD: ABNORMAL
EOSINOPHIL # BLD AUTO: 0.04 K/UL — SIGNIFICANT CHANGE UP (ref 0–0.5)
EOSINOPHIL NFR BLD AUTO: 0.3 % — SIGNIFICANT CHANGE UP (ref 0–6)
EPI CELLS # UR: SIGNIFICANT CHANGE UP
FLU A RESULT: SIGNIFICANT CHANGE UP
FLU A RESULT: SIGNIFICANT CHANGE UP
FLUAV AG NPH QL: SIGNIFICANT CHANGE UP
FLUBV AG NPH QL: SIGNIFICANT CHANGE UP
GLUCOSE BLDC GLUCOMTR-MCNC: 298 MG/DL — HIGH (ref 70–99)
GLUCOSE BLDC GLUCOMTR-MCNC: 80 MG/DL — SIGNIFICANT CHANGE UP (ref 70–99)
GLUCOSE BLDC GLUCOMTR-MCNC: 96 MG/DL — SIGNIFICANT CHANGE UP (ref 70–99)
GLUCOSE SERPL-MCNC: 295 MG/DL — HIGH (ref 70–99)
GLUCOSE UR QL: 1000 MG/DL
HCT VFR BLD CALC: 36.1 % — SIGNIFICANT CHANGE UP (ref 34.5–45)
HGB BLD-MCNC: 12 G/DL — SIGNIFICANT CHANGE UP (ref 11.5–15.5)
IMM GRANULOCYTES NFR BLD AUTO: 0.4 % — SIGNIFICANT CHANGE UP (ref 0–1.5)
INR BLD: 1.18 RATIO — HIGH (ref 0.88–1.16)
KETONES UR-MCNC: NEGATIVE — SIGNIFICANT CHANGE UP
LACTATE SERPL-SCNC: 2.2 MMOL/L — HIGH (ref 0.7–2)
LACTATE SERPL-SCNC: 3.1 MMOL/L — HIGH (ref 0.7–2)
LEUKOCYTE ESTERASE UR-ACNC: NEGATIVE — SIGNIFICANT CHANGE UP
LYMPHOCYTES # BLD AUTO: 19 % — SIGNIFICANT CHANGE UP (ref 13–44)
LYMPHOCYTES # BLD AUTO: 2.6 K/UL — SIGNIFICANT CHANGE UP (ref 1–3.3)
MCHC RBC-ENTMCNC: 29.5 PG — SIGNIFICANT CHANGE UP (ref 27–34)
MCHC RBC-ENTMCNC: 33.2 GM/DL — SIGNIFICANT CHANGE UP (ref 32–36)
MCV RBC AUTO: 88.7 FL — SIGNIFICANT CHANGE UP (ref 80–100)
MONOCYTES # BLD AUTO: 0.66 K/UL — SIGNIFICANT CHANGE UP (ref 0–0.9)
MONOCYTES NFR BLD AUTO: 4.8 % — SIGNIFICANT CHANGE UP (ref 2–14)
NEUTROPHILS # BLD AUTO: 10.29 K/UL — HIGH (ref 1.8–7.4)
NEUTROPHILS NFR BLD AUTO: 75.4 % — SIGNIFICANT CHANGE UP (ref 43–77)
NITRITE UR-MCNC: NEGATIVE — SIGNIFICANT CHANGE UP
NRBC # BLD: 0 /100 WBCS — SIGNIFICANT CHANGE UP (ref 0–0)
PH UR: 8 — SIGNIFICANT CHANGE UP (ref 5–8)
PLATELET # BLD AUTO: 313 K/UL — SIGNIFICANT CHANGE UP (ref 150–400)
POTASSIUM SERPL-MCNC: 4.3 MMOL/L — SIGNIFICANT CHANGE UP (ref 3.5–5.3)
POTASSIUM SERPL-SCNC: 4.3 MMOL/L — SIGNIFICANT CHANGE UP (ref 3.5–5.3)
PROT SERPL-MCNC: 7 G/DL — SIGNIFICANT CHANGE UP (ref 6–8.3)
PROT UR-MCNC: 100 MG/DL
PROTHROM AB SERPL-ACNC: 13.2 SEC — HIGH (ref 10–12.9)
RBC # BLD: 4.07 M/UL — SIGNIFICANT CHANGE UP (ref 3.8–5.2)
RBC # FLD: 13.2 % — SIGNIFICANT CHANGE UP (ref 10.3–14.5)
RBC CASTS # UR COMP ASSIST: SIGNIFICANT CHANGE UP /HPF (ref 0–4)
RSV RESULT: SIGNIFICANT CHANGE UP
RSV RNA RESP QL NAA+PROBE: SIGNIFICANT CHANGE UP
SODIUM SERPL-SCNC: 132 MMOL/L — LOW (ref 135–145)
SP GR SPEC: 1.01 — SIGNIFICANT CHANGE UP (ref 1.01–1.02)
UROBILINOGEN FLD QL: NEGATIVE MG/DL — SIGNIFICANT CHANGE UP
WBC # BLD: 13.67 K/UL — HIGH (ref 3.8–10.5)
WBC # FLD AUTO: 13.67 K/UL — HIGH (ref 3.8–10.5)
WBC UR QL: SIGNIFICANT CHANGE UP

## 2020-03-28 PROCEDURE — 93010 ELECTROCARDIOGRAM REPORT: CPT

## 2020-03-28 PROCEDURE — 99223 1ST HOSP IP/OBS HIGH 75: CPT | Mod: AI

## 2020-03-28 PROCEDURE — 99285 EMERGENCY DEPT VISIT HI MDM: CPT

## 2020-03-28 RX ORDER — AZITHROMYCIN 500 MG/1
500 TABLET, FILM COATED ORAL ONCE
Refills: 0 | Status: COMPLETED | OUTPATIENT
Start: 2020-03-28 | End: 2020-03-28

## 2020-03-28 RX ORDER — DEXTROSE 50 % IN WATER 50 %
25 SYRINGE (ML) INTRAVENOUS ONCE
Refills: 0 | Status: DISCONTINUED | OUTPATIENT
Start: 2020-03-28 | End: 2020-04-01

## 2020-03-28 RX ORDER — ASCORBIC ACID 60 MG
500 TABLET,CHEWABLE ORAL EVERY 12 HOURS
Refills: 0 | Status: DISCONTINUED | OUTPATIENT
Start: 2020-03-28 | End: 2020-04-01

## 2020-03-28 RX ORDER — SODIUM CHLORIDE 9 MG/ML
1000 INJECTION, SOLUTION INTRAVENOUS
Refills: 0 | Status: DISCONTINUED | OUTPATIENT
Start: 2020-03-28 | End: 2020-04-01

## 2020-03-28 RX ORDER — ACETAMINOPHEN 500 MG
650 TABLET ORAL ONCE
Refills: 0 | Status: COMPLETED | OUTPATIENT
Start: 2020-03-28 | End: 2020-03-28

## 2020-03-28 RX ORDER — SODIUM CHLORIDE 9 MG/ML
1400 INJECTION INTRAMUSCULAR; INTRAVENOUS; SUBCUTANEOUS ONCE
Refills: 0 | Status: COMPLETED | OUTPATIENT
Start: 2020-03-28 | End: 2020-03-28

## 2020-03-28 RX ORDER — DEXTROSE 50 % IN WATER 50 %
15 SYRINGE (ML) INTRAVENOUS ONCE
Refills: 0 | Status: DISCONTINUED | OUTPATIENT
Start: 2020-03-28 | End: 2020-04-01

## 2020-03-28 RX ORDER — GLUCAGON INJECTION, SOLUTION 0.5 MG/.1ML
1 INJECTION, SOLUTION SUBCUTANEOUS ONCE
Refills: 0 | Status: DISCONTINUED | OUTPATIENT
Start: 2020-03-28 | End: 2020-04-01

## 2020-03-28 RX ORDER — DEXTROSE 50 % IN WATER 50 %
12.5 SYRINGE (ML) INTRAVENOUS ONCE
Refills: 0 | Status: DISCONTINUED | OUTPATIENT
Start: 2020-03-28 | End: 2020-04-01

## 2020-03-28 RX ORDER — ENOXAPARIN SODIUM 100 MG/ML
40 INJECTION SUBCUTANEOUS DAILY
Refills: 0 | Status: DISCONTINUED | OUTPATIENT
Start: 2020-03-28 | End: 2020-04-01

## 2020-03-28 RX ORDER — INSULIN LISPRO 100/ML
VIAL (ML) SUBCUTANEOUS
Refills: 0 | Status: DISCONTINUED | OUTPATIENT
Start: 2020-03-28 | End: 2020-04-01

## 2020-03-28 RX ORDER — CEFTRIAXONE 500 MG/1
1000 INJECTION, POWDER, FOR SOLUTION INTRAMUSCULAR; INTRAVENOUS ONCE
Refills: 0 | Status: COMPLETED | OUTPATIENT
Start: 2020-03-28 | End: 2020-03-28

## 2020-03-28 RX ORDER — ACETAMINOPHEN 500 MG
975 TABLET ORAL EVERY 6 HOURS
Refills: 0 | Status: DISCONTINUED | OUTPATIENT
Start: 2020-03-28 | End: 2020-04-01

## 2020-03-28 RX ADMIN — ENOXAPARIN SODIUM 40 MILLIGRAM(S): 100 INJECTION SUBCUTANEOUS at 18:38

## 2020-03-28 RX ADMIN — Medication 650 MILLIGRAM(S): at 13:00

## 2020-03-28 RX ADMIN — Medication 650 MILLIGRAM(S): at 11:17

## 2020-03-28 RX ADMIN — SODIUM CHLORIDE 1400 MILLILITER(S): 9 INJECTION INTRAMUSCULAR; INTRAVENOUS; SUBCUTANEOUS at 11:15

## 2020-03-28 RX ADMIN — AZITHROMYCIN 255 MILLIGRAM(S): 500 TABLET, FILM COATED ORAL at 13:00

## 2020-03-28 RX ADMIN — CEFTRIAXONE 100 MILLIGRAM(S): 500 INJECTION, POWDER, FOR SOLUTION INTRAMUSCULAR; INTRAVENOUS at 14:30

## 2020-03-28 RX ADMIN — Medication 500 MILLIGRAM(S): at 18:38

## 2020-03-28 RX ADMIN — SODIUM CHLORIDE 1400 MILLILITER(S): 9 INJECTION INTRAMUSCULAR; INTRAVENOUS; SUBCUTANEOUS at 12:45

## 2020-03-28 RX ADMIN — Medication 100 MILLIGRAM(S): at 11:17

## 2020-03-28 NOTE — ED PROVIDER NOTE - OBJECTIVE STATEMENT
61 yo F with DM, developed cough and fever 9 days ago. Was seen at [facility] and was swabbed for COVID. Pt believes test was negative. CO worsening cough body aches, SOB and weakness. Took Tylenol yesterday. Daughter is also sick at home. Denies vomiting or diarrhea.  PCP Co.  Pt is currently on MacroBid for UTI.

## 2020-03-28 NOTE — ED PROVIDER NOTE - CLINICAL SUMMARY MEDICAL DECISION MAKING FREE TEXT BOX
60 F with COVID infection. Test results reviewed and were positive on 3/25. Plan CXR, labs, may need admission due to worsening symptoms and high risk Hx.

## 2020-03-28 NOTE — ED PROVIDER NOTE - PROGRESS NOTE DETAILS
CXR with bilat infiltrates. Lactate elevated. Pulse ox in low 90s on RA. Will need admission due to worsening symptoms and Serious risk factors.

## 2020-03-28 NOTE — H&P ADULT - ASSESSMENT
59 yo F with DM admitted to [facility] for evaluation of symptomatic fever and cough, and evaluation of positive COVID results.       Bilateral PNA likely due to Novel coronavirus  awaiting repeat swab  c/w cef and doxy  followup blood culture and urine antigen  vitamin C  proventil  IVF      Diabetes  Insulin in house      code: full  diet:  diabetes  dvt ppx: lovnenox

## 2020-03-28 NOTE — ED ADULT NURSE NOTE - CHIEF COMPLAINT QUOTE
Through  patient has had headache , cough up to 102, no appetite, nausea and weakness for 9 days getting worse. The patient's daughter has Covid but is isolated in the same home. Patient was tested for Covid At [facility] 4 days ago and was negative.

## 2020-03-28 NOTE — H&P ADULT - NSHPPHYSICALEXAM_GEN_ALL_CORE
~Objective:  Vitals  T(C): 37.2 (03-28-20 @ 16:00), Max: 37.9 (03-28-20 @ 10:25)  HR: 98 (03-28-20 @ 16:00) (98 - 119)  BP: 127/88 (03-28-20 @ 16:00) (127/88 - 170/84)  RR: 20 (03-28-20 @ 16:00) (20 - 22)  SpO2: 95% (03-28-20 @ 16:00) (94% - 95%)    Physical Exam:  General: comfortable, no acute distress, well nourished  HEENT: Atraumatic, no LAD, trachea midline, PERRLA  Cardiovascular: normal s1s2, no murmurs, gallops or fricition rubs  Pulmonary: clear to ausculation Bilaterally, no wheezing , rhonchi  Gastrointestinal: soft non tender non distended, no masses felt, no organomegally  Muscloskeletal: no lower extremity edema, intact bilateral lower extremity pulses  Neurological: CN II-12 intact. No focal weakness  Psychiatrical: normal mood, cooperative  SKIN: no rash, lesions or ulcers

## 2020-03-28 NOTE — ED ADULT NURSE NOTE - GLASGOW COMA SCALE: BEST MOTOR RESPONSE
Right shoulder pain s/p mechanical fall at work 2 days ago. Denies head trauma denies loc. (M6) obeys commands

## 2020-03-28 NOTE — ED ADULT TRIAGE NOTE - CHIEF COMPLAINT QUOTE
Through  patient has had headache , cough up to 102, no appetite, nausea and weakness for 9 days getting worse. The patient's daughter has Covid but is isolated in the same home. Patient was tested for Covid At [facility] 4 days ago and was negative. Through  patient has had headache , cough, fever up to 102, no appetite, nausea and weakness for 9 days getting worse. The patient's daughter has Covid but is isolated in the same home. Patient was tested for Covid at [facility] 4 days ago and was negative per patient.

## 2020-03-28 NOTE — H&P ADULT - HISTORY OF PRESENT ILLNESS
61 yo F with DM admitted to [facility] for evaluation of symptomatic fever and cough, and evaluation of positive COVID results.      used .Patient reports symptoms started 9 days ago. Due to her symptoms she was seen at [facility] and was swabbed for COVID and was positive. CO worsening cough body aches, SOB and weakness. Took Tylenol yesterday.    ER course:    Patient was febrile, tachycardic, lactic acid of 3 with a white count of 13.. sodium of 132  CXR significant for patchy infiltrae disease  patient received: tylenol 650mg once , ceftriaxone azithromycin , tessalon sandra for cough + 1.4  bolus liter of fluid      As of now: patient was reporting heache and nausea, now resolvedl.

## 2020-03-28 NOTE — CONSULT NOTE ADULT - PROBLEM SELECTOR RECOMMENDATION 9
cough - fever - known covid pos  robitussin and hycodan prn and o2 support and tylenol  would start Plaquenil -   may need IVF  I and O  admission for monitoring and care  isol precs  o2 titration - keep sat > 90 pct  supportive care and management of comorbidities - DM HTN OP OA

## 2020-03-28 NOTE — H&P ADULT - NSHPREVIEWOFSYSTEMS_GEN_ALL_CORE
Review of Systems:  General:denies fever chills, headache, weakness  HEENT: denies blurry vision,diffculty swallowing, difficulty hearing, tinnitus  Cardiovascular: denies chest pain  ,palpitations  Pulmonary:denies shortness of breath, cough, wheezing, hemoptysis  Gastrointestinal: denies abdominal pain, constipation, diarrhea,nausea , vomiting, hematochezia  : denies hematuria, dysuria, or incontinence  Neurological: denies weakness, numbness , tingling, dizziness, tremors  MSK: denies muscle pain, difficulty ambulating, swelling, back pain  skin: denies skin rash, itching, burning, or  skin lesions  Psychiatrical: denies mood disturbances, anxierty, feeling depressed, depression , or difficulty sleeping

## 2020-03-28 NOTE — CONSULT NOTE ADULT - SUBJECTIVE AND OBJECTIVE BOX
Date/Time Patient Seen:  		  Referring MD:   Data Reviewed	       Patient is a 60y old  Female who presents with a chief complaint of     Subjective/HPI     PAST MEDICAL & SURGICAL HISTORY:        Medication list         MEDICATIONS  (STANDING):    MEDICATIONS  (PRN):  acetaminophen  Suppository .. 975 milliGRAM(s) Rectal every 6 hours PRN Temp greater or equal to 38C (100.4F), Mild Pain (1 - 3)  guaiFENesin   Syrup  (Sugar-Free) 200 milliGRAM(s) Oral every 6 hours PRN Cough         Vitals log        ICU Vital Signs Last 24 Hrs  T(C): 37.9 (28 Mar 2020 10:25), Max: 37.9 (28 Mar 2020 10:25)  T(F): 100.3 (28 Mar 2020 10:25), Max: 100.3 (28 Mar 2020 10:25)  HR: 114 (28 Mar 2020 11:30) (114 - 119)  BP: 170/84 (28 Mar 2020 11:30) (163/76 - 170/84)  BP(mean): --  ABP: --  ABP(mean): --  RR: 22 (28 Mar 2020 11:30) (22 - 22)  SpO2: 95% (28 Mar 2020 11:30) (95% - 95%)           Input and Output:  I&O's Detail      Lab Data                        12.0   13.67 )-----------( 313      ( 28 Mar 2020 11:17 )             36.1     03-28    132<L>  |  97  |  8   ----------------------------<  295<H>  4.3   |  24  |  0.97    Ca    8.3<L>      28 Mar 2020 11:17    TPro  7.0  /  Alb  2.7<L>  /  TBili  0.4  /  DBili  x   /  AST  38  /  ALT  29  /  AlkPhos  113  03-28            Review of Systems	      Objective     Physical Examination        Pertinent Lab findings & Imaging      Di:  NO   Adequate UO     I&O's Detail           Discussed with:     Cultures:	        Radiology

## 2020-03-28 NOTE — ED ADULT NURSE NOTE - OBJECTIVE STATEMENT
Pt came for headache , cough, no appetite, nausea and weakness for 9 days getting worse. Patient's daughter reported  has Covid but is isolated in the same home. Patient was tested for Covid At [facility] 4 days ago and was negative. Pt came for headache , cough, no appetite, nausea and weakness for 9 days getting worse. Patient's daughter reported  has Covid but is isolated in the same home. Patient was tested for Covid At [facility] 4 days ago.

## 2020-03-28 NOTE — ED PROVIDER NOTE - CONSTITUTIONAL, MLM
normal... Ill appearing, awake, alert, oriented to person, place, time/situation and in mild apparent distress.

## 2020-03-29 LAB
ALBUMIN SERPL ELPH-MCNC: 2.3 G/DL — LOW (ref 3.3–5)
ALP SERPL-CCNC: 109 U/L — SIGNIFICANT CHANGE UP (ref 30–120)
ALT FLD-CCNC: 42 U/L DA — SIGNIFICANT CHANGE UP (ref 10–60)
ANION GAP SERPL CALC-SCNC: 8 MMOL/L — SIGNIFICANT CHANGE UP (ref 5–17)
AST SERPL-CCNC: 46 U/L — HIGH (ref 10–40)
BASOPHILS # BLD AUTO: 0.01 K/UL — SIGNIFICANT CHANGE UP (ref 0–0.2)
BASOPHILS NFR BLD AUTO: 0.1 % — SIGNIFICANT CHANGE UP (ref 0–2)
BILIRUB SERPL-MCNC: 0.3 MG/DL — SIGNIFICANT CHANGE UP (ref 0.2–1.2)
BUN SERPL-MCNC: 6 MG/DL — LOW (ref 7–23)
CALCIUM SERPL-MCNC: 8.5 MG/DL — SIGNIFICANT CHANGE UP (ref 8.4–10.5)
CHLORIDE SERPL-SCNC: 106 MMOL/L — SIGNIFICANT CHANGE UP (ref 96–108)
CO2 SERPL-SCNC: 27 MMOL/L — SIGNIFICANT CHANGE UP (ref 22–31)
CREAT SERPL-MCNC: 0.86 MG/DL — SIGNIFICANT CHANGE UP (ref 0.5–1.3)
CRP SERPL-MCNC: 28.12 MG/DL — HIGH (ref 0–0.4)
CULTURE RESULTS: SIGNIFICANT CHANGE UP
EOSINOPHIL # BLD AUTO: 0.19 K/UL — SIGNIFICANT CHANGE UP (ref 0–0.5)
EOSINOPHIL NFR BLD AUTO: 1.9 % — SIGNIFICANT CHANGE UP (ref 0–6)
ERYTHROCYTE [SEDIMENTATION RATE] IN BLOOD: 95 MM/HR — HIGH (ref 0–20)
FERRITIN SERPL-MCNC: 322 NG/ML — HIGH (ref 15–150)
GLUCOSE BLDC GLUCOMTR-MCNC: 148 MG/DL — HIGH (ref 70–99)
GLUCOSE BLDC GLUCOMTR-MCNC: 180 MG/DL — HIGH (ref 70–99)
GLUCOSE BLDC GLUCOMTR-MCNC: 204 MG/DL — HIGH (ref 70–99)
GLUCOSE BLDC GLUCOMTR-MCNC: 237 MG/DL — HIGH (ref 70–99)
GLUCOSE SERPL-MCNC: 162 MG/DL — HIGH (ref 70–99)
HBA1C BLD-MCNC: 8.6 % — HIGH (ref 4–5.6)
HCT VFR BLD CALC: 35.4 % — SIGNIFICANT CHANGE UP (ref 34.5–45)
HCV AB S/CO SERPL IA: 0.59 S/CO — SIGNIFICANT CHANGE UP (ref 0–0.99)
HCV AB SERPL-IMP: SIGNIFICANT CHANGE UP
HGB BLD-MCNC: 11.7 G/DL — SIGNIFICANT CHANGE UP (ref 11.5–15.5)
IMM GRANULOCYTES NFR BLD AUTO: 0.4 % — SIGNIFICANT CHANGE UP (ref 0–1.5)
LDH SERPL L TO P-CCNC: 439 U/L — HIGH (ref 50–242)
LYMPHOCYTES # BLD AUTO: 2.04 K/UL — SIGNIFICANT CHANGE UP (ref 1–3.3)
LYMPHOCYTES # BLD AUTO: 20.3 % — SIGNIFICANT CHANGE UP (ref 13–44)
MAGNESIUM SERPL-MCNC: 1.3 MG/DL — LOW (ref 1.6–2.6)
MCHC RBC-ENTMCNC: 29.6 PG — SIGNIFICANT CHANGE UP (ref 27–34)
MCHC RBC-ENTMCNC: 33.1 GM/DL — SIGNIFICANT CHANGE UP (ref 32–36)
MCV RBC AUTO: 89.6 FL — SIGNIFICANT CHANGE UP (ref 80–100)
MONOCYTES # BLD AUTO: 0.5 K/UL — SIGNIFICANT CHANGE UP (ref 0–0.9)
MONOCYTES NFR BLD AUTO: 5 % — SIGNIFICANT CHANGE UP (ref 2–14)
NEUTROPHILS # BLD AUTO: 7.29 K/UL — SIGNIFICANT CHANGE UP (ref 1.8–7.4)
NEUTROPHILS NFR BLD AUTO: 72.3 % — SIGNIFICANT CHANGE UP (ref 43–77)
NRBC # BLD: 0 /100 WBCS — SIGNIFICANT CHANGE UP (ref 0–0)
PHOSPHATE SERPL-MCNC: 2.8 MG/DL — SIGNIFICANT CHANGE UP (ref 2.5–4.5)
PLATELET # BLD AUTO: 355 K/UL — SIGNIFICANT CHANGE UP (ref 150–400)
POTASSIUM SERPL-MCNC: 3.6 MMOL/L — SIGNIFICANT CHANGE UP (ref 3.5–5.3)
POTASSIUM SERPL-SCNC: 3.6 MMOL/L — SIGNIFICANT CHANGE UP (ref 3.5–5.3)
PROCALCITONIN SERPL-MCNC: 0.21 NG/ML — HIGH (ref 0.02–0.1)
PROT SERPL-MCNC: 7 G/DL — SIGNIFICANT CHANGE UP (ref 6–8.3)
RBC # BLD: 3.95 M/UL — SIGNIFICANT CHANGE UP (ref 3.8–5.2)
RBC # FLD: 13.2 % — SIGNIFICANT CHANGE UP (ref 10.3–14.5)
SARS-COV-2 RNA SPEC QL NAA+PROBE: DETECTED
SODIUM SERPL-SCNC: 141 MMOL/L — SIGNIFICANT CHANGE UP (ref 135–145)
SPECIMEN SOURCE: SIGNIFICANT CHANGE UP
WBC # BLD: 10.07 K/UL — SIGNIFICANT CHANGE UP (ref 3.8–10.5)
WBC # FLD AUTO: 10.07 K/UL — SIGNIFICANT CHANGE UP (ref 3.8–10.5)

## 2020-03-29 PROCEDURE — 99233 SBSQ HOSP IP/OBS HIGH 50: CPT

## 2020-03-29 RX ORDER — ZINC SULFATE TAB 220 MG (50 MG ZINC EQUIVALENT) 220 (50 ZN) MG
220 TAB ORAL DAILY
Refills: 0 | Status: DISCONTINUED | OUTPATIENT
Start: 2020-03-29 | End: 2020-04-01

## 2020-03-29 RX ORDER — GABAPENTIN 400 MG/1
300 CAPSULE ORAL EVERY 8 HOURS
Refills: 0 | Status: DISCONTINUED | OUTPATIENT
Start: 2020-03-29 | End: 2020-04-01

## 2020-03-29 RX ORDER — ACETAMINOPHEN 500 MG
650 TABLET ORAL EVERY 4 HOURS
Refills: 0 | Status: DISCONTINUED | OUTPATIENT
Start: 2020-03-29 | End: 2020-04-01

## 2020-03-29 RX ORDER — CYCLOBENZAPRINE HYDROCHLORIDE 10 MG/1
5 TABLET, FILM COATED ORAL
Refills: 0 | Status: DISCONTINUED | OUTPATIENT
Start: 2020-03-29 | End: 2020-04-01

## 2020-03-29 RX ORDER — MAGNESIUM SULFATE 500 MG/ML
2 VIAL (ML) INJECTION ONCE
Refills: 0 | Status: COMPLETED | OUTPATIENT
Start: 2020-03-29 | End: 2020-03-29

## 2020-03-29 RX ADMIN — ENOXAPARIN SODIUM 40 MILLIGRAM(S): 100 INJECTION SUBCUTANEOUS at 11:06

## 2020-03-29 RX ADMIN — ZINC SULFATE TAB 220 MG (50 MG ZINC EQUIVALENT) 220 MILLIGRAM(S): 220 (50 ZN) TAB at 18:02

## 2020-03-29 RX ADMIN — Medication 1: at 17:43

## 2020-03-29 RX ADMIN — Medication 500 MILLIGRAM(S): at 06:30

## 2020-03-29 RX ADMIN — Medication 50 GRAM(S): at 15:53

## 2020-03-29 RX ADMIN — Medication 500 MILLIGRAM(S): at 18:03

## 2020-03-29 RX ADMIN — Medication 650 MILLIGRAM(S): at 15:51

## 2020-03-29 RX ADMIN — Medication 650 MILLIGRAM(S): at 15:52

## 2020-03-29 RX ADMIN — Medication 200 MILLIGRAM(S): at 15:52

## 2020-03-29 NOTE — PROGRESS NOTE ADULT - ASSESSMENT
61 yo F with DM admitted to [facility] for evaluation of symptomatic fever and cough, and evaluation of positive COVID results.       Bilateral PNA likely due to Novel coronavirus  was on cef and doxy = stop cef and doxy now  followup blood culture and urine antigen  vitamin C 500 q12h  add zinc  proventil prn  IVF  Incentive spirometry  have encouraged patient to ambulate and try to lie on belly    Diabetes  Insulin in house      code: full  diet:  diabetes  dvt ppx: lovnenox        Patient remains medically acute and continues to require oxygenation

## 2020-03-29 NOTE — PROGRESS NOTE ADULT - SUBJECTIVE AND OBJECTIVE BOX
Patient seen and examined at bedside.  patient prefers english is ok with communication  reports she feels very bad + cough and is short of breath  she reports this is day 12 of her symptoms  Fever spike of 102 today  Now on 3 liters at 94 percent    blood culture and urine cx pending    COVID positive    Review of Systems:  General: ++++fever chills, headache, weakness  HEENT: denies blurry vision,diffculty swallowing, difficulty hearing, tinnitus  Cardiovascular: denies chest pain  ,palpitations  Pulmonary: +++shortness of breath, +++cough, wheezing, hemoptysis  Gastrointestinal: denies abdominal pain, constipation, diarrhea,nausea , vomiting, hematochezia  : denies hematuria, dysuria, or incontinence  Neurological: denies weakness, numbness , tingling, dizziness, tremors  MSK: denies muscle pain, difficulty ambulating, swelling, back pain  skin: denies skin rash, itching, burning, or  skin lesions  Psychiatrical: denies mood disturbances, anxierty, feeling depressed, depression , or difficulty sleeping    Objective:  Vitals  T(C): 36.7 (03-29-20 @ 07:36), Max: 38.9 (03-28-20 @ 17:30)  HR: 90 (03-29-20 @ 07:36) (90 - 99)  BP: 131/73 (03-29-20 @ 07:36) (122/78 - 138/80)  RR: 16 (03-29-20 @ 07:36) (16 - 20)  SpO2: 98% (03-29-20 @ 07:36) (94% - 99%)    Physical Exam:  General: anxious, no acute distress, well nourished  HEENT: Atraumatic, no LAD, trachea midline, PERRLA  Cardiovascular: normal s1s2, no murmurs, gallops or fricition rubs  Pulmonary: decreased no wheezing , rhonchi  Gastrointestinal: soft non tender non distended, no masses felt, no organomegally  Muscloskeletal: no lower extremity edema, intact bilateral lower extremity pulses  Neurological: CN II-12 intact. No focal weakness  Psychiatrical: normal mood, cooperative  SKIN: no rash, lesions or ulcers    ~  Labs:                          11.7   10.07 )-----------( 355      ( 29 Mar 2020 07:47 )             35.4     03-29    141  |  106  |  6<L>  ----------------------------<  162<H>  3.6   |  27  |  0.86    Ca    8.5      29 Mar 2020 07:47  Phos  2.8     03-29  Mg     1.3     03-29    TPro  7.0  /  Alb  2.3<L>  /  TBili  0.3  /  DBili  x   /  AST  46<H>  /  ALT  42  /  AlkPhos  109  03-29    LIVER FUNCTIONS - ( 29 Mar 2020 07:47 )  Alb: 2.3 g/dL / Pro: 7.0 g/dL / ALK PHOS: 109 U/L / ALT: 42 U/L DA / AST: 46 U/L / GGT: x           PT/INR - ( 28 Mar 2020 11:17 )   PT: 13.2 sec;   INR: 1.18 ratio         PTT - ( 28 Mar 2020 11:17 )  PTT:26.5 sec      Active Medications  MEDICATIONS  (STANDING):  ascorbic acid 500 milliGRAM(s) Oral every 12 hours  dextrose 5%. 1000 milliLiter(s) (50 mL/Hr) IV Continuous <Continuous>  dextrose 50% Injectable 12.5 Gram(s) IV Push once  dextrose 50% Injectable 25 Gram(s) IV Push once  dextrose 50% Injectable 25 Gram(s) IV Push once  enoxaparin Injectable 40 milliGRAM(s) SubCutaneous daily  insulin lispro (HumaLOG) corrective regimen sliding scale   SubCutaneous three times a day before meals    MEDICATIONS  (PRN):  acetaminophen   Tablet .. 650 milliGRAM(s) Oral every 4 hours PRN Temp greater or equal to 38C (100.4F)  acetaminophen  Suppository .. 975 milliGRAM(s) Rectal every 6 hours PRN Temp greater or equal to 38C (100.4F), Mild Pain (1 - 3)  cyclobenzaprine 5 milliGRAM(s) Oral two times a day PRN Muscle Spasm  dextrose 40% Gel 15 Gram(s) Oral once PRN Blood Glucose LESS THAN 70 milliGRAM(s)/deciliter  gabapentin 300 milliGRAM(s) Oral every 8 hours PRN neuropathic pain  glucagon  Injectable 1 milliGRAM(s) IntraMuscular once PRN Glucose LESS THAN 70 milligrams/deciliter  guaiFENesin   Syrup  (Sugar-Free) 200 milliGRAM(s) Oral every 6 hours PRN Cough

## 2020-03-30 LAB
ANION GAP SERPL CALC-SCNC: 8 MMOL/L — SIGNIFICANT CHANGE UP (ref 5–17)
BASOPHILS # BLD AUTO: 0.02 K/UL — SIGNIFICANT CHANGE UP (ref 0–0.2)
BASOPHILS NFR BLD AUTO: 0.2 % — SIGNIFICANT CHANGE UP (ref 0–2)
BUN SERPL-MCNC: 9 MG/DL — SIGNIFICANT CHANGE UP (ref 7–23)
CALCIUM SERPL-MCNC: 8.9 MG/DL — SIGNIFICANT CHANGE UP (ref 8.4–10.5)
CHLORIDE SERPL-SCNC: 102 MMOL/L — SIGNIFICANT CHANGE UP (ref 96–108)
CO2 SERPL-SCNC: 28 MMOL/L — SIGNIFICANT CHANGE UP (ref 22–31)
CREAT SERPL-MCNC: 0.83 MG/DL — SIGNIFICANT CHANGE UP (ref 0.5–1.3)
CRP SERPL-MCNC: 34.34 MG/DL — HIGH (ref 0–0.4)
EOSINOPHIL # BLD AUTO: 0.27 K/UL — SIGNIFICANT CHANGE UP (ref 0–0.5)
EOSINOPHIL NFR BLD AUTO: 2.9 % — SIGNIFICANT CHANGE UP (ref 0–6)
FERRITIN SERPL-MCNC: 375 NG/ML — HIGH (ref 15–150)
GLUCOSE BLDC GLUCOMTR-MCNC: 195 MG/DL — HIGH (ref 70–99)
GLUCOSE BLDC GLUCOMTR-MCNC: 279 MG/DL — HIGH (ref 70–99)
GLUCOSE BLDC GLUCOMTR-MCNC: 340 MG/DL — HIGH (ref 70–99)
GLUCOSE BLDC GLUCOMTR-MCNC: 359 MG/DL — HIGH (ref 70–99)
GLUCOSE SERPL-MCNC: 249 MG/DL — HIGH (ref 70–99)
HCT VFR BLD CALC: 36.9 % — SIGNIFICANT CHANGE UP (ref 34.5–45)
HGB BLD-MCNC: 12.2 G/DL — SIGNIFICANT CHANGE UP (ref 11.5–15.5)
IMM GRANULOCYTES NFR BLD AUTO: 0.5 % — SIGNIFICANT CHANGE UP (ref 0–1.5)
LYMPHOCYTES # BLD AUTO: 2.65 K/UL — SIGNIFICANT CHANGE UP (ref 1–3.3)
LYMPHOCYTES # BLD AUTO: 28.6 % — SIGNIFICANT CHANGE UP (ref 13–44)
MAGNESIUM SERPL-MCNC: 1.7 MG/DL — SIGNIFICANT CHANGE UP (ref 1.6–2.6)
MCHC RBC-ENTMCNC: 29.7 PG — SIGNIFICANT CHANGE UP (ref 27–34)
MCHC RBC-ENTMCNC: 33.1 GM/DL — SIGNIFICANT CHANGE UP (ref 32–36)
MCV RBC AUTO: 89.8 FL — SIGNIFICANT CHANGE UP (ref 80–100)
MONOCYTES # BLD AUTO: 0.58 K/UL — SIGNIFICANT CHANGE UP (ref 0–0.9)
MONOCYTES NFR BLD AUTO: 6.3 % — SIGNIFICANT CHANGE UP (ref 2–14)
NEUTROPHILS # BLD AUTO: 5.71 K/UL — SIGNIFICANT CHANGE UP (ref 1.8–7.4)
NEUTROPHILS NFR BLD AUTO: 61.5 % — SIGNIFICANT CHANGE UP (ref 43–77)
NRBC # BLD: 0 /100 WBCS — SIGNIFICANT CHANGE UP (ref 0–0)
PHOSPHATE SERPL-MCNC: 3.5 MG/DL — SIGNIFICANT CHANGE UP (ref 2.5–4.5)
PLATELET # BLD AUTO: 420 K/UL — HIGH (ref 150–400)
POTASSIUM SERPL-MCNC: 3.8 MMOL/L — SIGNIFICANT CHANGE UP (ref 3.5–5.3)
POTASSIUM SERPL-SCNC: 3.8 MMOL/L — SIGNIFICANT CHANGE UP (ref 3.5–5.3)
RBC # BLD: 4.11 M/UL — SIGNIFICANT CHANGE UP (ref 3.8–5.2)
RBC # FLD: 13.1 % — SIGNIFICANT CHANGE UP (ref 10.3–14.5)
SODIUM SERPL-SCNC: 138 MMOL/L — SIGNIFICANT CHANGE UP (ref 135–145)
WBC # BLD: 9.28 K/UL — SIGNIFICANT CHANGE UP (ref 3.8–10.5)
WBC # FLD AUTO: 9.28 K/UL — SIGNIFICANT CHANGE UP (ref 3.8–10.5)

## 2020-03-30 PROCEDURE — 99233 SBSQ HOSP IP/OBS HIGH 50: CPT

## 2020-03-30 RX ORDER — MAGNESIUM OXIDE 400 MG ORAL TABLET 241.3 MG
400 TABLET ORAL ONCE
Refills: 0 | Status: COMPLETED | OUTPATIENT
Start: 2020-03-30 | End: 2020-03-30

## 2020-03-30 RX ORDER — INSULIN LISPRO 100/ML
2 VIAL (ML) SUBCUTANEOUS ONCE
Refills: 0 | Status: COMPLETED | OUTPATIENT
Start: 2020-03-30 | End: 2020-03-30

## 2020-03-30 RX ADMIN — ENOXAPARIN SODIUM 40 MILLIGRAM(S): 100 INJECTION SUBCUTANEOUS at 12:49

## 2020-03-30 RX ADMIN — Medication 5: at 12:46

## 2020-03-30 RX ADMIN — ZINC SULFATE TAB 220 MG (50 MG ZINC EQUIVALENT) 220 MILLIGRAM(S): 220 (50 ZN) TAB at 12:48

## 2020-03-30 RX ADMIN — Medication 2 UNIT(S): at 22:07

## 2020-03-30 RX ADMIN — MAGNESIUM OXIDE 400 MG ORAL TABLET 400 MILLIGRAM(S): 241.3 TABLET ORAL at 20:20

## 2020-03-30 RX ADMIN — Medication 3: at 17:58

## 2020-03-30 RX ADMIN — Medication 500 MILLIGRAM(S): at 05:25

## 2020-03-30 RX ADMIN — Medication 500 MILLIGRAM(S): at 17:59

## 2020-03-30 NOTE — PROGRESS NOTE ADULT - SUBJECTIVE AND OBJECTIVE BOX
Patient seen and examined at bedside. Patient prefers Yi, but reports english is ok with communication. Discussion with patient was in both english and Slovenian.  reports she feels improved since yesterday, still reports cough but reports she can walk to bathroom without SOB. She reports using her incentive spirometer in room.   she reports this is day 11 of her symptoms      INTERVAL HPI/OVERNIGHT EVENTS:    MEDICATIONS  (STANDING):  ascorbic acid 500 milliGRAM(s) Oral every 12 hours  dextrose 5%. 1000 milliLiter(s) (50 mL/Hr) IV Continuous <Continuous>  dextrose 50% Injectable 12.5 Gram(s) IV Push once  dextrose 50% Injectable 25 Gram(s) IV Push once  dextrose 50% Injectable 25 Gram(s) IV Push once  enoxaparin Injectable 40 milliGRAM(s) SubCutaneous daily  insulin lispro (HumaLOG) corrective regimen sliding scale   SubCutaneous three times a day before meals  zinc sulfate 220 milliGRAM(s) Oral daily    MEDICATIONS  (PRN):  acetaminophen   Tablet .. 650 milliGRAM(s) Oral every 4 hours PRN Temp greater or equal to 38C (100.4F)  acetaminophen  Suppository .. 975 milliGRAM(s) Rectal every 6 hours PRN Temp greater or equal to 38C (100.4F), Mild Pain (1 - 3)  cyclobenzaprine 5 milliGRAM(s) Oral two times a day PRN Muscle Spasm  dextrose 40% Gel 15 Gram(s) Oral once PRN Blood Glucose LESS THAN 70 milliGRAM(s)/deciliter  gabapentin 300 milliGRAM(s) Oral every 8 hours PRN neuropathic pain  glucagon  Injectable 1 milliGRAM(s) IntraMuscular once PRN Glucose LESS THAN 70 milligrams/deciliter  guaiFENesin   Syrup  (Sugar-Free) 200 milliGRAM(s) Oral every 6 hours PRN Cough      Allergies    hydrocodone (Hives)    Intolerances        Review of Systems:  General: reports fever, chills  HEENT: denies blurry vision,diffculty swallowing, difficulty hearing, tinnitus  Cardiovascular: denies chest pain  ,palpitations  Pulmonary: reports shortness of breath, cough  Gastrointestinal: denies abdominal pain, constipation, diarrhea, nausea , vomiting, hematochezia  : denies hematuria, dysuria, or incontinence  Neurological: denies weakness, numbness , tingling, dizziness, tremors  MSK: denies muscle pain, difficulty ambulating, swelling, back pain  skin: denies skin rash, itching, burning, or  skin lesions  Psychiatrical: denies mood disturbances, anxiety feeling depressed, depression , or difficulty sleeping      Vital Signs Last 24 Hrs  T(C): 36.9 (30 Mar 2020 07:55), Max: 37.4 (29 Mar 2020 15:26)  T(F): 98.5 (30 Mar 2020 07:55), Max: 99.3 (29 Mar 2020 15:26)  HR: 88 (30 Mar 2020 07:55) (88 - 92)  BP: 143/83 (30 Mar 2020 07:55) (136/73 - 150/78)  BP(mean): --  RR: 19 (30 Mar 2020 07:55) (18 - 19)  SpO2: 93% (30 Mar 2020 07:55) (90% - 93%)    PHYSICAL EXAM:  GENERAL: Adult female, NAD, well-groomed, well-developed  HEAD:  Atraumatic, Normocephalic  EYES: EOMI, PERRLA, conjunctiva and sclera clear  ENMT: Moist mucous membranes, Good dentition, No lesions appreciated  NECK: Supple, No JVD, no thyroid enlargement appreciated  NERVOUS SYSTEM:  Alert & Oriented X3, Good concentration; All 4 extremities mobile, no gross sensory deficits.   CHEST/LUNG: Clear to auscultation bilaterally; No rales, rhonchi, wheezing, or rubs  HEART: Regular rate and rhythm; No murmurs, rubs, or gallops  ABDOMEN: Soft, Nontender, Nondistended  EXTREMITIES:  Moves all limbs, No clubbing, cyanosis, or edema  LYMPH: No lymphadenopathy noted  SKIN: No rashes or lesions appreciated    LABS:                        12.2   9.28  )-----------( 420      ( 30 Mar 2020 07:21 )             36.9     30 Mar 2020 07:21    138    |  102    |  9      ----------------------------<  249    3.8     |  28     |  0.83     Ca    8.9        30 Mar 2020 07:21  Phos  3.5       30 Mar 2020 07:21  Mg     1.7       30 Mar 2020 07:21          CAPILLARY BLOOD GLUCOSE      POCT Blood Glucose.: 359 mg/dL (30 Mar 2020 12:03)  POCT Blood Glucose.: 195 mg/dL (30 Mar 2020 08:19)  POCT Blood Glucose.: 237 mg/dL (29 Mar 2020 21:17)  POCT Blood Glucose.: 180 mg/dL (29 Mar 2020 16:53)      RADIOLOGY & ADDITIONAL TESTS:    Imaging Personally Reviewed:  [X] YES     Care Discussed with Consultants/Other Providers: Yes

## 2020-03-30 NOTE — PROGRESS NOTE ADULT - ATTENDING COMMENTS
I have personally seen, examined, and participated in the care of this patient. I have reviewed all pertinent clinical information, including history, physical exam, and plan.

## 2020-03-30 NOTE — DIETITIAN INITIAL EVALUATION ADULT. - OTHER INFO
Pt 61 yo F with DM admitted to [facility] for evaluation of symptomatic fever and cough, and evaluation of positive COVID results. Spoke to pt over the phone in Danish: Pt states she has had poor appetite x several days. She thinks she lost weight but could not provide amount or time frame. Ht/wt 59" 153# BMI 30. Pt obese. Noted HgbA1C 8.6%- as per H& P pt only on metformin but pt states she also takes insulin. She states she checks BG 3x/day before  each meal and that numbers usually <200mg/dl. Pt states she is aware of which foods contain carbohydrate. Briefly reviewed portion control and balanced meals. Currently BG elevated but likely 2/2 disease process. Given poor intake, recommend magic cup fortified ice cream supplement (sugar free) BID. Also obtained some preferences to help increase po intake. Pt denies trouble chewing/swallowing or other GI symptoms at this time. Anticipate intake to improve once pt starts feeling better. RD remains available.

## 2020-03-30 NOTE — PROGRESS NOTE ADULT - ASSESSMENT
59 yo F with DM who presents with symptomatic fever and cough, in the context of positive COVID results.       Bilateral PNA likely due to Novel coronavirus  Fever well-controlled but patient still has O2 saturation around 90-92% on room air  continue O2 wean, can resume nasal cannula if patient becomes dyspneic  s/p ceftriaxone and azithromycin   blood culture x2 no growth to date, urine culture negative  vitamin C 500 q12h  continue zinc  albuterol prn  Encouraged Incentive spirometry  encouraged patient to ambulate in room    Diabetes  Insulin sliding scale      code: full  diet:  diabetes  dvt ppx: lovnenox

## 2020-03-31 ENCOUNTER — TRANSCRIPTION ENCOUNTER (OUTPATIENT)
Age: 60
End: 2020-03-31

## 2020-03-31 LAB
ALBUMIN SERPL ELPH-MCNC: 2.4 G/DL — LOW (ref 3.3–5)
ALP SERPL-CCNC: 140 U/L — HIGH (ref 30–120)
ALT FLD-CCNC: 47 U/L DA — SIGNIFICANT CHANGE UP (ref 10–60)
ANION GAP SERPL CALC-SCNC: 11 MMOL/L — SIGNIFICANT CHANGE UP (ref 5–17)
APPEARANCE UR: CLEAR — SIGNIFICANT CHANGE UP
AST SERPL-CCNC: 35 U/L — SIGNIFICANT CHANGE UP (ref 10–40)
BASOPHILS # BLD AUTO: 0 K/UL — SIGNIFICANT CHANGE UP (ref 0–0.2)
BASOPHILS NFR BLD AUTO: 0 % — SIGNIFICANT CHANGE UP (ref 0–2)
BILIRUB SERPL-MCNC: 0.6 MG/DL — SIGNIFICANT CHANGE UP (ref 0.2–1.2)
BILIRUB UR-MCNC: NEGATIVE — SIGNIFICANT CHANGE UP
BUN SERPL-MCNC: 13 MG/DL — SIGNIFICANT CHANGE UP (ref 7–23)
CALCIUM SERPL-MCNC: 9.3 MG/DL — SIGNIFICANT CHANGE UP (ref 8.4–10.5)
CHLORIDE SERPL-SCNC: 100 MMOL/L — SIGNIFICANT CHANGE UP (ref 96–108)
CO2 SERPL-SCNC: 28 MMOL/L — SIGNIFICANT CHANGE UP (ref 22–31)
COLOR SPEC: YELLOW — SIGNIFICANT CHANGE UP
CREAT SERPL-MCNC: 0.81 MG/DL — SIGNIFICANT CHANGE UP (ref 0.5–1.3)
DIFF PNL FLD: NEGATIVE — SIGNIFICANT CHANGE UP
EOSINOPHIL # BLD AUTO: 0.45 K/UL — SIGNIFICANT CHANGE UP (ref 0–0.5)
EOSINOPHIL NFR BLD AUTO: 5 % — SIGNIFICANT CHANGE UP (ref 0–6)
EPI CELLS # UR: SIGNIFICANT CHANGE UP
GLUCOSE BLDC GLUCOMTR-MCNC: 219 MG/DL — HIGH (ref 70–99)
GLUCOSE BLDC GLUCOMTR-MCNC: 235 MG/DL — HIGH (ref 70–99)
GLUCOSE BLDC GLUCOMTR-MCNC: 251 MG/DL — HIGH (ref 70–99)
GLUCOSE BLDC GLUCOMTR-MCNC: 257 MG/DL — HIGH (ref 70–99)
GLUCOSE BLDC GLUCOMTR-MCNC: 336 MG/DL — HIGH (ref 70–99)
GLUCOSE BLDC GLUCOMTR-MCNC: 347 MG/DL — HIGH (ref 70–99)
GLUCOSE SERPL-MCNC: 273 MG/DL — HIGH (ref 70–99)
GLUCOSE UR QL: 1000 MG/DL
HCT VFR BLD CALC: 39.7 % — SIGNIFICANT CHANGE UP (ref 34.5–45)
HGB BLD-MCNC: 12.7 G/DL — SIGNIFICANT CHANGE UP (ref 11.5–15.5)
KETONES UR-MCNC: ABNORMAL
LEUKOCYTE ESTERASE UR-ACNC: NEGATIVE — SIGNIFICANT CHANGE UP
LYMPHOCYTES # BLD AUTO: 2.59 K/UL — SIGNIFICANT CHANGE UP (ref 1–3.3)
LYMPHOCYTES # BLD AUTO: 29 % — SIGNIFICANT CHANGE UP (ref 13–44)
MAGNESIUM SERPL-MCNC: 1.8 MG/DL — SIGNIFICANT CHANGE UP (ref 1.6–2.6)
MANUAL SMEAR VERIFICATION: SIGNIFICANT CHANGE UP
MCHC RBC-ENTMCNC: 29 PG — SIGNIFICANT CHANGE UP (ref 27–34)
MCHC RBC-ENTMCNC: 32 GM/DL — SIGNIFICANT CHANGE UP (ref 32–36)
MCV RBC AUTO: 90.6 FL — SIGNIFICANT CHANGE UP (ref 80–100)
MONOCYTES # BLD AUTO: 0.36 K/UL — SIGNIFICANT CHANGE UP (ref 0–0.9)
MONOCYTES NFR BLD AUTO: 4 % — SIGNIFICANT CHANGE UP (ref 2–14)
NEUTROPHILS # BLD AUTO: 5.54 K/UL — SIGNIFICANT CHANGE UP (ref 1.8–7.4)
NEUTROPHILS NFR BLD AUTO: 62 % — SIGNIFICANT CHANGE UP (ref 43–77)
NITRITE UR-MCNC: NEGATIVE — SIGNIFICANT CHANGE UP
NRBC # BLD: 0 — SIGNIFICANT CHANGE UP
NRBC # BLD: SIGNIFICANT CHANGE UP /100 WBCS (ref 0–0)
PH UR: 7 — SIGNIFICANT CHANGE UP (ref 5–8)
PLAT MORPH BLD: NORMAL — SIGNIFICANT CHANGE UP
PLATELET # BLD AUTO: 496 K/UL — HIGH (ref 150–400)
POTASSIUM SERPL-MCNC: 4.3 MMOL/L — SIGNIFICANT CHANGE UP (ref 3.5–5.3)
POTASSIUM SERPL-SCNC: 4.3 MMOL/L — SIGNIFICANT CHANGE UP (ref 3.5–5.3)
PROT SERPL-MCNC: 8 G/DL — SIGNIFICANT CHANGE UP (ref 6–8.3)
PROT UR-MCNC: 30 MG/DL
RBC # BLD: 4.38 M/UL — SIGNIFICANT CHANGE UP (ref 3.8–5.2)
RBC # FLD: 13.3 % — SIGNIFICANT CHANGE UP (ref 10.3–14.5)
RBC BLD AUTO: NORMAL — SIGNIFICANT CHANGE UP
RBC CASTS # UR COMP ASSIST: SIGNIFICANT CHANGE UP /HPF (ref 0–4)
SODIUM SERPL-SCNC: 139 MMOL/L — SIGNIFICANT CHANGE UP (ref 135–145)
SP GR SPEC: 1 — LOW (ref 1.01–1.02)
UROBILINOGEN FLD QL: NEGATIVE MG/DL — SIGNIFICANT CHANGE UP
WBC # BLD: 8.94 K/UL — SIGNIFICANT CHANGE UP (ref 3.8–10.5)
WBC # FLD AUTO: 8.94 K/UL — SIGNIFICANT CHANGE UP (ref 3.8–10.5)
WBC UR QL: SIGNIFICANT CHANGE UP

## 2020-03-31 PROCEDURE — 99233 SBSQ HOSP IP/OBS HIGH 50: CPT

## 2020-03-31 RX ORDER — ASCORBIC ACID 60 MG
1 TABLET,CHEWABLE ORAL
Qty: 0 | Refills: 0 | DISCHARGE
Start: 2020-03-31

## 2020-03-31 RX ORDER — CYCLOBENZAPRINE HYDROCHLORIDE 10 MG/1
1 TABLET, FILM COATED ORAL
Qty: 0 | Refills: 0 | DISCHARGE
Start: 2020-03-31

## 2020-03-31 RX ORDER — INSULIN LISPRO 100/ML
2 VIAL (ML) SUBCUTANEOUS ONCE
Refills: 0 | Status: COMPLETED | OUTPATIENT
Start: 2020-03-31 | End: 2020-03-31

## 2020-03-31 RX ORDER — SODIUM CHLORIDE 0.65 %
1 AEROSOL, SPRAY (ML) NASAL
Refills: 0 | Status: DISCONTINUED | OUTPATIENT
Start: 2020-03-31 | End: 2020-04-01

## 2020-03-31 RX ORDER — PHENAZOPYRIDINE HCL 100 MG
200 TABLET ORAL THREE TIMES A DAY
Refills: 0 | Status: DISCONTINUED | OUTPATIENT
Start: 2020-03-31 | End: 2020-04-01

## 2020-03-31 RX ORDER — NITROFURANTOIN MACROCRYSTAL 50 MG
100 CAPSULE ORAL
Refills: 0 | Status: DISCONTINUED | OUTPATIENT
Start: 2020-03-31 | End: 2020-04-01

## 2020-03-31 RX ORDER — ACETAMINOPHEN 500 MG
2 TABLET ORAL
Qty: 0 | Refills: 0 | DISCHARGE
Start: 2020-03-31

## 2020-03-31 RX ADMIN — ZINC SULFATE TAB 220 MG (50 MG ZINC EQUIVALENT) 220 MILLIGRAM(S): 220 (50 ZN) TAB at 13:21

## 2020-03-31 RX ADMIN — Medication 1 SPRAY(S): at 20:37

## 2020-03-31 RX ADMIN — Medication 3: at 13:37

## 2020-03-31 RX ADMIN — Medication 200 MILLIGRAM(S): at 02:53

## 2020-03-31 RX ADMIN — Medication 2 UNIT(S): at 22:32

## 2020-03-31 RX ADMIN — Medication 500 MILLIGRAM(S): at 05:21

## 2020-03-31 RX ADMIN — ENOXAPARIN SODIUM 40 MILLIGRAM(S): 100 INJECTION SUBCUTANEOUS at 13:21

## 2020-03-31 RX ADMIN — Medication 4: at 09:23

## 2020-03-31 RX ADMIN — Medication 2: at 17:16

## 2020-03-31 RX ADMIN — Medication 200 MILLIGRAM(S): at 17:54

## 2020-03-31 RX ADMIN — Medication 500 MILLIGRAM(S): at 20:38

## 2020-03-31 RX ADMIN — Medication 100 MILLIGRAM(S): at 20:37

## 2020-03-31 NOTE — DISCHARGE NOTE PROVIDER - NSDCMRMEDTOKEN_GEN_ALL_CORE_FT
acetaminophen 325 mg oral tablet: 2 tab(s) orally every 4 hours, As needed, Temp greater or equal to 38C (100.4F)  ascorbic acid 500 mg oral tablet: 1 tab(s) orally every 12 hours  aspirin 81 mg oral delayed release tablet: 1 tab(s) orally once a day  atorvastatin 20 mg oral tablet: 1 tab(s) orally once a day  Calcium 600+D oral tablet: 1 tab(s) orally 2 times a day  cyclobenzaprine 5 mg oral tablet: 1 tab(s) orally 2 times a day, As needed, Muscle Spasm  gabapentin 300 mg oral capsule: 1 cap(s) orally 3 times a day, As Needed  guaiFENesin 100 mg/5 mL oral liquid: 10 milliliter(s) orally every 6 hours, As needed, Cough  metFORMIN 500 mg oral tablet: 2 tab(s) orally 2 times a day  nitrofurantoin macrocrystals 100 mg oral capsule: 1 cap(s) orally 2 times a day (with meals)  omeprazole 20 mg oral delayed release capsule: 1 cap(s) orally once a day  sulfaSALAzine 500 mg oral tablet: 1 tab(s) orally 2 times a day

## 2020-03-31 NOTE — PROGRESS NOTE ADULT - SUBJECTIVE AND OBJECTIVE BOX
Patient is a 60y old  Female who presents with a chief complaint of Fever headache. (31 Mar 2020 12:42)      INTERVAL HPI/OVERNIGHT EVENTS:  Pt seen awake in bed. Pt returned to using oxygen nasal cannula overnight, reports no fever, reports no SOB, reports some cough. Family reports pt has been noticing dried blood when she blows her nose. Reports no dyspnea on exertion and using incentive spirometer.     MEDICATIONS  (STANDING):  ascorbic acid 500 milliGRAM(s) Oral every 12 hours  dextrose 5%. 1000 milliLiter(s) (50 mL/Hr) IV Continuous <Continuous>  dextrose 50% Injectable 12.5 Gram(s) IV Push once  dextrose 50% Injectable 25 Gram(s) IV Push once  dextrose 50% Injectable 25 Gram(s) IV Push once  enoxaparin Injectable 40 milliGRAM(s) SubCutaneous daily  insulin lispro (HumaLOG) corrective regimen sliding scale   SubCutaneous three times a day before meals  sodium chloride 0.65% Nasal 1 Spray(s) Both Nostrils two times a day  zinc sulfate 220 milliGRAM(s) Oral daily    MEDICATIONS  (PRN):  acetaminophen   Tablet .. 650 milliGRAM(s) Oral every 4 hours PRN Temp greater or equal to 38C (100.4F)  acetaminophen  Suppository .. 975 milliGRAM(s) Rectal every 6 hours PRN Temp greater or equal to 38C (100.4F), Mild Pain (1 - 3)  cyclobenzaprine 5 milliGRAM(s) Oral two times a day PRN Muscle Spasm  dextrose 40% Gel 15 Gram(s) Oral once PRN Blood Glucose LESS THAN 70 milliGRAM(s)/deciliter  gabapentin 300 milliGRAM(s) Oral every 8 hours PRN neuropathic pain  glucagon  Injectable 1 milliGRAM(s) IntraMuscular once PRN Glucose LESS THAN 70 milligrams/deciliter  guaiFENesin   Syrup  (Sugar-Free) 200 milliGRAM(s) Oral every 6 hours PRN Cough      Allergies    hydrocodone (Hives)    Intolerances        REVIEW OF SYSTEMS:  CONSTITUTIONAL: No fever, weight loss, or fatigue  EYES: No eye pain, visual disturbances, or discharge  ENMT:  No difficulty hearing, tinnitus, vertigo; No sinus or throat pain. Reports seeing blood in nose as noted above.  NECK: No pain or stiffness  BREASTS: No pain, masses, or nipple discharge  RESPIRATORY: No cough, wheezing, chills or hemoptysis; No shortness of breath  CARDIOVASCULAR: No chest pain, palpitations, lightheadedness, or leg swelling  GASTROINTESTINAL: No abdominal or epigastric pain. No nausea, vomiting, or hematemesis; No diarrhea or constipation. No melena or hematochezia.  GENITOURINARY: burning on urination, denies hematuria, or incontinence  NEUROLOGICAL: No headaches, memory loss, vertigo, loss of strength, numbness, or tremors  SKIN: No itching, burning, rashes, or lesions   LYMPH NODES: No enlarged glands  ENDOCRINE: No heat or cold intolerance; No hair loss; No polydipsia or polyuria  MUSCULOSKELETAL: No joint pain or swelling; No muscle, back, or extremity pain  PSYCHIATRIC: No depression, anxiety, or mood swings  HEME/LYMPH: No easy bruising, or bleeding gums  ALLERGY AND IMMUNOLOGIC: No hives or eczema    Vital Signs Last 24 Hrs  T(C): 37.1 (31 Mar 2020 15:20), Max: 37.2 (31 Mar 2020 11:26)  T(F): 98.8 (31 Mar 2020 15:20), Max: 98.9 (31 Mar 2020 11:26)  HR: 92 (31 Mar 2020 15:20) (77 - 92)  BP: 156/83 (31 Mar 2020 15:20) (136/83 - 156/83)  BP(mean): --  RR: 19 (31 Mar 2020 15:20) (18 - 20)  SpO2: 92% (31 Mar 2020 15:20) (91% - 98%)    PHYSICAL EXAM:  GENERAL: NAD, well-groomed, well-developed  HEAD:  Atraumatic, Normocephalic  EYES: EOMI, PERRLA, conjunctiva and sclera clear  ENMT: Moist mucous membranes, Good dentition, No lesions; No tonsillar erythema, exudates, or enlargement  NECK: Supple, No JVD, Normal thyroid  NERVOUS SYSTEM:  Alert & Oriented X3, Good concentration; All 4 extremities mobile, no gross sensory deficits.   CHEST/LUNG: Clear to auscultation bilaterally; No rales, rhonchi, wheezing, or rubs  HEART: Regular rate and rhythm; No murmurs, rubs, or gallops  ABDOMEN: Soft, Nontender, Nondistended; Bowel sounds present  EXTREMITIES:  2+ Peripheral Pulses, No clubbing, cyanosis, or edema  LYMPH: No lymphadenopathy noted  SKIN: No rashes or lesions    LABS:                        12.7   8.94  )-----------( 496      ( 31 Mar 2020 07:53 )             39.7     31 Mar 2020 07:53    139    |  100    |  13     ----------------------------<  273    4.3     |  28     |  0.81     Ca    9.3        31 Mar 2020 07:53  Mg     1.8       31 Mar 2020 07:53    TPro  8.0    /  Alb  2.4    /  TBili  0.6    /  DBili  x      /  AST  35     /  ALT  47     /  AlkPhos  140    31 Mar 2020 07:53        CAPILLARY BLOOD GLUCOSE      POCT Blood Glucose.: 219 mg/dL (31 Mar 2020 16:48)  POCT Blood Glucose.: 257 mg/dL (31 Mar 2020 13:28)  POCT Blood Glucose.: 251 mg/dL (31 Mar 2020 12:13)  POCT Blood Glucose.: 336 mg/dL (31 Mar 2020 09:14)  POCT Blood Glucose.: 235 mg/dL (31 Mar 2020 07:45)  POCT Blood Glucose.: 340 mg/dL (30 Mar 2020 21:12)      RADIOLOGY & ADDITIONAL TESTS:    Imaging Personally Reviewed:  [X] YES

## 2020-03-31 NOTE — DISCHARGE NOTE PROVIDER - NSDCFUADDINST_GEN_ALL_CORE_FT
You were diagnosed with Covid 19.    You should restrict activities outside your home except for getting medical care.  Do not go to work, school or public areas.  Avoid using pubic transportation, ride share or taxis.  Seperate yourself from other people and pets.  If possible use seperate rooms, bathrooms and utensils.  Call ahead when going to visit your doctor.  Wear a facemask when interacting with other people.  Cover your coughs and sneezes with your sleeve and elbow.  Wash your hands often.  Clean surfaces with disinfectant .  Monitor your symptoms.  Call your doctor if you have fever over 100, shortness of breath or new or unusual symptoms.

## 2020-03-31 NOTE — DISCHARGE NOTE PROVIDER - NSDCCPCAREPLAN_GEN_ALL_CORE_FT
PRINCIPAL DISCHARGE DIAGNOSIS  Diagnosis: Coronavirus infection  Assessment and Plan of Treatment: Follow quarantine  precautions and follow up with your primary care provider.

## 2020-03-31 NOTE — PROGRESS NOTE ADULT - ASSESSMENT
59 yo F with DM who presents with symptomatic fever and cough, in the context of positive COVID results.       Bilateral PNA likely due to Novel coronavirus  Fever well-controlled but patient still has O2 saturation around 90-92% on room air  weaned but pt had episode of 88% on room air. Resume O2 with plan to wean in AM  vitamin C 500 q12h  continue zinc  albuterol prn  Encouraged Incentive spirometry  encouraged patient to ambulate in room    Dysuria  Multiple differentials possible including UTI or yeast infection  UA showed small bacteria on admission  urine culture ordered, UA  will treat empirically with macrobid and pyridium with instructions to complete regimen at home    Epistaxis  likely 2/2 oxygen use  nasal saline BID, will continue for a few days PRN after discharge    Diabetes  Insulin sliding scale      code: full  diet:  diabetes  dvt ppx: lovnenox

## 2020-03-31 NOTE — DISCHARGE NOTE PROVIDER - NSDCACTIVITY_GEN_ALL_CORE
Bathing allowed/Stairs allowed/Walking - Indoors allowed/Walking - Outdoors allowed/Showering allowed

## 2020-03-31 NOTE — DISCHARGE NOTE PROVIDER - HOSPITAL COURSE
60 year old female presented with complaint of 9 days of cough and fever. Was seen at [facility] in [place] prior to her admission in [place] where she was swabbed for Covid.  Covid test positive. Admitted to  [facility] on March 28.  Chest x-ray showed patchy infiltrates.  Supportive treatment was provided. Supplemental oxygen was provided for O2 sats below 92%. Azithromycin and ceftriaxone were given. Pre-existing diabetic. Clinically improved by March 31.

## 2020-03-31 NOTE — DISCHARGE NOTE PROVIDER - CARE PROVIDER_API CALL
Co, Cesar LAMBERT)  Internal Medicine  [address] [place] [address]  Phone: [phone]  Fax: [phone]  Follow Up Time:

## 2020-04-01 ENCOUNTER — OUTPATIENT (OUTPATIENT)
Dept: OUTPATIENT SERVICES | Facility: HOSPITAL | Age: 60
LOS: 1 days | End: 2020-04-01
Payer: MEDICAID

## 2020-04-01 ENCOUNTER — TRANSCRIPTION ENCOUNTER (OUTPATIENT)
Age: 60
End: 2020-04-01

## 2020-04-01 VITALS — HEART RATE: 100 BPM | OXYGEN SATURATION: 91 %

## 2020-04-01 DIAGNOSIS — Z95.828 PRESENCE OF OTHER VASCULAR IMPLANTS AND GRAFTS: Chronic | ICD-10-CM

## 2020-04-01 DIAGNOSIS — Z98.890 OTHER SPECIFIED POSTPROCEDURAL STATES: Chronic | ICD-10-CM

## 2020-04-01 DIAGNOSIS — E11.3592 TYPE 2 DIABETES MELLITUS WITH PROLIFERATIVE DIABETIC RETINOPATHY WITHOUT MACULAR EDEMA, LEFT EYE: Chronic | ICD-10-CM

## 2020-04-01 DIAGNOSIS — M25.9 JOINT DISORDER, UNSPECIFIED: Chronic | ICD-10-CM

## 2020-04-01 DIAGNOSIS — Z98.51 TUBAL LIGATION STATUS: Chronic | ICD-10-CM

## 2020-04-01 LAB
ANION GAP SERPL CALC-SCNC: 10 MMOL/L — SIGNIFICANT CHANGE UP (ref 5–17)
BASOPHILS # BLD AUTO: 0.03 K/UL — SIGNIFICANT CHANGE UP (ref 0–0.2)
BASOPHILS NFR BLD AUTO: 0.4 % — SIGNIFICANT CHANGE UP (ref 0–2)
BUN SERPL-MCNC: 16 MG/DL — SIGNIFICANT CHANGE UP (ref 7–23)
CALCIUM SERPL-MCNC: 9.4 MG/DL — SIGNIFICANT CHANGE UP (ref 8.4–10.5)
CHLORIDE SERPL-SCNC: 100 MMOL/L — SIGNIFICANT CHANGE UP (ref 96–108)
CO2 SERPL-SCNC: 28 MMOL/L — SIGNIFICANT CHANGE UP (ref 22–31)
CREAT SERPL-MCNC: 0.81 MG/DL — SIGNIFICANT CHANGE UP (ref 0.5–1.3)
CRP SERPL-MCNC: 13.53 MG/DL — HIGH (ref 0–0.4)
CULTURE RESULTS: SIGNIFICANT CHANGE UP
CULTURE RESULTS: SIGNIFICANT CHANGE UP
EOSINOPHIL # BLD AUTO: 0.37 K/UL — SIGNIFICANT CHANGE UP (ref 0–0.5)
EOSINOPHIL NFR BLD AUTO: 4.7 % — SIGNIFICANT CHANGE UP (ref 0–6)
FERRITIN SERPL-MCNC: 401 NG/ML — HIGH (ref 15–150)
GLUCOSE BLDC GLUCOMTR-MCNC: 271 MG/DL — HIGH (ref 70–99)
GLUCOSE BLDC GLUCOMTR-MCNC: 360 MG/DL — HIGH (ref 70–99)
GLUCOSE SERPL-MCNC: 301 MG/DL — HIGH (ref 70–99)
HCT VFR BLD CALC: 38.6 % — SIGNIFICANT CHANGE UP (ref 34.5–45)
HGB BLD-MCNC: 12.4 G/DL — SIGNIFICANT CHANGE UP (ref 11.5–15.5)
IMM GRANULOCYTES NFR BLD AUTO: 0.6 % — SIGNIFICANT CHANGE UP (ref 0–1.5)
LDH SERPL L TO P-CCNC: 600 U/L — HIGH (ref 50–242)
LYMPHOCYTES # BLD AUTO: 2.75 K/UL — SIGNIFICANT CHANGE UP (ref 1–3.3)
LYMPHOCYTES # BLD AUTO: 35 % — SIGNIFICANT CHANGE UP (ref 13–44)
MAGNESIUM SERPL-MCNC: 1.7 MG/DL — SIGNIFICANT CHANGE UP (ref 1.6–2.6)
MCHC RBC-ENTMCNC: 29.2 PG — SIGNIFICANT CHANGE UP (ref 27–34)
MCHC RBC-ENTMCNC: 32.1 GM/DL — SIGNIFICANT CHANGE UP (ref 32–36)
MCV RBC AUTO: 90.8 FL — SIGNIFICANT CHANGE UP (ref 80–100)
MONOCYTES # BLD AUTO: 0.63 K/UL — SIGNIFICANT CHANGE UP (ref 0–0.9)
MONOCYTES NFR BLD AUTO: 8 % — SIGNIFICANT CHANGE UP (ref 2–14)
NEUTROPHILS # BLD AUTO: 4.02 K/UL — SIGNIFICANT CHANGE UP (ref 1.8–7.4)
NEUTROPHILS NFR BLD AUTO: 51.3 % — SIGNIFICANT CHANGE UP (ref 43–77)
NRBC # BLD: 0 /100 WBCS — SIGNIFICANT CHANGE UP (ref 0–0)
PHOSPHATE SERPL-MCNC: 4.2 MG/DL — SIGNIFICANT CHANGE UP (ref 2.5–4.5)
PLATELET # BLD AUTO: 565 K/UL — HIGH (ref 150–400)
POTASSIUM SERPL-MCNC: 4.6 MMOL/L — SIGNIFICANT CHANGE UP (ref 3.5–5.3)
POTASSIUM SERPL-SCNC: 4.6 MMOL/L — SIGNIFICANT CHANGE UP (ref 3.5–5.3)
RBC # BLD: 4.25 M/UL — SIGNIFICANT CHANGE UP (ref 3.8–5.2)
RBC # FLD: 13.1 % — SIGNIFICANT CHANGE UP (ref 10.3–14.5)
SODIUM SERPL-SCNC: 138 MMOL/L — SIGNIFICANT CHANGE UP (ref 135–145)
SPECIMEN SOURCE: SIGNIFICANT CHANGE UP
SPECIMEN SOURCE: SIGNIFICANT CHANGE UP
WBC # BLD: 7.85 K/UL — SIGNIFICANT CHANGE UP (ref 3.8–10.5)
WBC # FLD AUTO: 7.85 K/UL — SIGNIFICANT CHANGE UP (ref 3.8–10.5)

## 2020-04-01 PROCEDURE — 83036 HEMOGLOBIN GLYCOSYLATED A1C: CPT

## 2020-04-01 PROCEDURE — 87086 URINE CULTURE/COLONY COUNT: CPT

## 2020-04-01 PROCEDURE — 82962 GLUCOSE BLOOD TEST: CPT

## 2020-04-01 PROCEDURE — 84145 PROCALCITONIN (PCT): CPT

## 2020-04-01 PROCEDURE — 87631 RESP VIRUS 3-5 TARGETS: CPT

## 2020-04-01 PROCEDURE — 86803 HEPATITIS C AB TEST: CPT

## 2020-04-01 PROCEDURE — 99239 HOSP IP/OBS DSCHRG MGMT >30: CPT

## 2020-04-01 PROCEDURE — 87635 SARS-COV-2 COVID-19 AMP PRB: CPT

## 2020-04-01 PROCEDURE — 99285 EMERGENCY DEPT VISIT HI MDM: CPT | Mod: 25

## 2020-04-01 PROCEDURE — 94640 AIRWAY INHALATION TREATMENT: CPT

## 2020-04-01 PROCEDURE — 85652 RBC SED RATE AUTOMATED: CPT

## 2020-04-01 PROCEDURE — 96360 HYDRATION IV INFUSION INIT: CPT

## 2020-04-01 PROCEDURE — 83735 ASSAY OF MAGNESIUM: CPT

## 2020-04-01 PROCEDURE — 36415 COLL VENOUS BLD VENIPUNCTURE: CPT

## 2020-04-01 PROCEDURE — 80053 COMPREHEN METABOLIC PANEL: CPT

## 2020-04-01 PROCEDURE — 71045 X-RAY EXAM CHEST 1 VIEW: CPT

## 2020-04-01 PROCEDURE — 83605 ASSAY OF LACTIC ACID: CPT

## 2020-04-01 PROCEDURE — 87040 BLOOD CULTURE FOR BACTERIA: CPT

## 2020-04-01 PROCEDURE — 82728 ASSAY OF FERRITIN: CPT

## 2020-04-01 PROCEDURE — 93005 ELECTROCARDIOGRAM TRACING: CPT

## 2020-04-01 PROCEDURE — G9001: CPT

## 2020-04-01 PROCEDURE — 85730 THROMBOPLASTIN TIME PARTIAL: CPT

## 2020-04-01 PROCEDURE — 83615 LACTATE (LD) (LDH) ENZYME: CPT

## 2020-04-01 PROCEDURE — 84100 ASSAY OF PHOSPHORUS: CPT

## 2020-04-01 PROCEDURE — 86140 C-REACTIVE PROTEIN: CPT

## 2020-04-01 PROCEDURE — 81001 URINALYSIS AUTO W/SCOPE: CPT

## 2020-04-01 PROCEDURE — 85610 PROTHROMBIN TIME: CPT

## 2020-04-01 PROCEDURE — 80048 BASIC METABOLIC PNL TOTAL CA: CPT

## 2020-04-01 PROCEDURE — 85027 COMPLETE CBC AUTOMATED: CPT

## 2020-04-01 RX ORDER — ALBUTEROL 90 UG/1
2 AEROSOL, METERED ORAL EVERY 4 HOURS
Refills: 0 | Status: DISCONTINUED | OUTPATIENT
Start: 2020-04-01 | End: 2020-04-01

## 2020-04-01 RX ORDER — NITROFURANTOIN MACROCRYSTAL 50 MG
1 CAPSULE ORAL
Qty: 8 | Refills: 0
Start: 2020-04-01 | End: 2020-04-04

## 2020-04-01 RX ORDER — INSULIN LISPRO 100 [IU]/ML
18 INJECTION, SUSPENSION SUBCUTANEOUS
Qty: 3 | Refills: 0
Start: 2020-04-01 | End: 2020-04-30

## 2020-04-01 RX ORDER — INSULIN LISPRO 100 [IU]/ML
18 INJECTION, SUSPENSION SUBCUTANEOUS
Qty: 3 | Refills: 0
Start: 2020-04-01

## 2020-04-01 RX ADMIN — Medication 100 MILLIGRAM(S): at 11:36

## 2020-04-01 RX ADMIN — Medication 1 SPRAY(S): at 14:45

## 2020-04-01 RX ADMIN — ZINC SULFATE TAB 220 MG (50 MG ZINC EQUIVALENT) 220 MILLIGRAM(S): 220 (50 ZN) TAB at 11:36

## 2020-04-01 RX ADMIN — Medication 500 MILLIGRAM(S): at 11:36

## 2020-04-01 RX ADMIN — Medication 200 MILLIGRAM(S): at 13:24

## 2020-04-01 RX ADMIN — Medication 3: at 08:41

## 2020-04-01 RX ADMIN — Medication 5: at 11:49

## 2020-04-01 RX ADMIN — ENOXAPARIN SODIUM 40 MILLIGRAM(S): 100 INJECTION SUBCUTANEOUS at 11:36

## 2020-04-01 RX ADMIN — Medication 1 SPRAY(S): at 05:21

## 2020-04-01 RX ADMIN — ALBUTEROL 2 PUFF(S): 90 AEROSOL, METERED ORAL at 14:36

## 2020-04-01 NOTE — ADVANCED PRACTICE NURSE CONSULT - RECOMMEDATIONS
Diabetes Outpatient referral  diabetes survival skills  Involve family in care  goal 100 to 180mg/dl  resume mix insulin at home decrease by 50%  follow up with PCP  Follow up with  Diabetes Educator post discharge

## 2020-04-01 NOTE — PROGRESS NOTE ADULT - SUBJECTIVE AND OBJECTIVE BOX
Patient is a 60y old  Female who presents with a chief complaint of Fever headache (31 Mar 2020 18:02)    Pt seen awake in bed. Pt denies fever, reports some cough, denies SOB. Pt reports using incentive spirometer. Reports burning on urination has resolved. Reports no dyspnea on exertion. Of note, patient reports using nebulizer machine at home with albuterol, and Humalog Mix 75/25 at home 40u qAM 30u qPM.    MEDICATIONS  (STANDING):  ALBUTerol    90 MICROgram(s) HFA Inhaler 2 Puff(s) Inhalation every 4 hours  ascorbic acid 500 milliGRAM(s) Oral every 12 hours  dextrose 5%. 1000 milliLiter(s) (50 mL/Hr) IV Continuous <Continuous>  dextrose 50% Injectable 12.5 Gram(s) IV Push once  dextrose 50% Injectable 25 Gram(s) IV Push once  dextrose 50% Injectable 25 Gram(s) IV Push once  enoxaparin Injectable 40 milliGRAM(s) SubCutaneous daily  insulin lispro (HumaLOG) corrective regimen sliding scale   SubCutaneous three times a day before meals  nitrofurantoin monohydrate/macrocrystals (MACROBID) 100 milliGRAM(s) Oral two times a day with meals  sodium chloride 0.65% Nasal 1 Spray(s) Both Nostrils two times a day  zinc sulfate 220 milliGRAM(s) Oral daily    MEDICATIONS  (PRN):  acetaminophen   Tablet .. 650 milliGRAM(s) Oral every 4 hours PRN Temp greater or equal to 38C (100.4F)  acetaminophen  Suppository .. 975 milliGRAM(s) Rectal every 6 hours PRN Temp greater or equal to 38C (100.4F), Mild Pain (1 - 3)  cyclobenzaprine 5 milliGRAM(s) Oral two times a day PRN Muscle Spasm  dextrose 40% Gel 15 Gram(s) Oral once PRN Blood Glucose LESS THAN 70 milliGRAM(s)/deciliter  gabapentin 300 milliGRAM(s) Oral every 8 hours PRN neuropathic pain  glucagon  Injectable 1 milliGRAM(s) IntraMuscular once PRN Glucose LESS THAN 70 milligrams/deciliter  guaiFENesin   Syrup  (Sugar-Free) 200 milliGRAM(s) Oral every 6 hours PRN Cough  phenazopyridine 200 milliGRAM(s) Oral three times a day PRN pain on urination      Allergies    hydrocodone (Hives)    Intolerances        REVIEW OF SYSTEMS:  CONSTITUTIONAL: No fever, weight loss, or fatigue  EYES: No eye pain, visual disturbances, or discharge  ENMT:  No difficulty hearing, tinnitus, vertigo; No sinus or throat pain. Reports seeing blood in nose as noted above.  NECK: No pain or stiffness  BREASTS: No pain, masses, or nipple discharge  RESPIRATORY: No cough, wheezing, chills or hemoptysis; No shortness of breath  CARDIOVASCULAR: No chest pain, palpitations, lightheadedness, or leg swelling  GASTROINTESTINAL: No abdominal or epigastric pain. No nausea, vomiting, or hematemesis; No diarrhea or constipation. No melena or hematochezia.  GENITOURINARY: burning on urination, denies hematuria, or incontinence  NEUROLOGICAL: No headaches, memory loss, vertigo, loss of strength, numbness, or tremors  SKIN: No itching, burning, rashes, or lesions   LYMPH NODES: No enlarged glands  ENDOCRINE: No heat or cold intolerance; No hair loss; No polydipsia or polyuria  MUSCULOSKELETAL: No joint pain or swelling; No muscle, back, or extremity pain  PSYCHIATRIC: No depression, anxiety, or mood swings  HEME/LYMPH: No easy bruising, or bleeding gums  ALLERGY AND IMMUNOLOGIC: No hives or eczema      Vital Signs Last 24 Hrs  T(C): 36.8 (2020 08:43), Max: 36.9 (31 Mar 2020 23:20)  T(F): 98.3 (2020 08:43), Max: 98.4 (31 Mar 2020 23:20)  HR: 100 (2020 11:05) (89 - 100)  BP: 141/83 (2020 08:43) (141/83 - 152/84)  BP(mean): --  RR: 16 (2020 08:43) (16 - 19)  SpO2: 91% (2020 11:05) (91% - 96%)    PHYSICAL EXAM:  GENERAL: NAD, well-groomed, well-developed  HEAD:  Atraumatic, Normocephalic  EYES: EOMI, PERRLA, conjunctiva and sclera clear  ENMT: Moist mucous membranes, Good dentition, No lesions; No tonsillar erythema, exudates, or enlargement  NECK: Supple, No JVD, Normal thyroid  NERVOUS SYSTEM:  Alert & Oriented X3, Good concentration; All 4 extremities mobile, no gross sensory deficits.   CHEST/LUNG: Clear to auscultation bilaterally; No rales, rhonchi, wheezing, or rubs  HEART: Regular rate and rhythm; No murmurs, rubs, or gallops  ABDOMEN: Soft, Nontender, Nondistended; Bowel sounds present  EXTREMITIES:  2+ Peripheral Pulses, No clubbing, cyanosis, or edema  LYMPH: No lymphadenopathy noted  SKIN: No rashes or lesions    LABS:                        12.4   7.85  )-----------( 565      ( 2020 07:14 )             38.6     2020 07:14    138    |  100    |  16     ----------------------------<  301    4.6     |  28     |  0.81     Ca    9.4        2020 07:14  Phos  4.2       2020 07:14  Mg     1.7       2020 07:14        Urinalysis Basic - ( 31 Mar 2020 20:41 )    Color: Yellow / Appearance: Clear / S.005 / pH: x  Gluc: x / Ketone: Trace  / Bili: Negative / Urobili: Negative mg/dL   Blood: x / Protein: 30 mg/dL / Nitrite: Negative   Leuk Esterase: Negative / RBC: 0-2 /HPF / WBC 0-2   Sq Epi: x / Non Sq Epi: Few / Bacteria: x      CAPILLARY BLOOD GLUCOSE      POCT Blood Glucose.: 360 mg/dL (2020 11:36)  POCT Blood Glucose.: 271 mg/dL (2020 08:03)  POCT Blood Glucose.: 347 mg/dL (31 Mar 2020 21:07)       Care Discussed with Consultants/Other Providers: Diabetic nurse educator    Advanced Directives: [ ] DNR  [ ] No feeding tube  [ ] MOLST in chart  [ ] MOLST completed today  [ ] Unknown

## 2020-04-01 NOTE — ADVANCED PRACTICE NURSE CONSULT - ASSESSMENT
Patient is a 60y old  Female who presents with a chief complaint of Fever headache (31 Mar 2020 18:02)      Type 2 DM on insulin at home  Humalog 75/25 40 units sq am 36 units sq PM plus oral agents.  she self manages her diabetes with blood glucose monitoring bid and had be followed by  Dr MARILY Padgett for many years.  She lives with her daughter who is very active in her care.    HPI:  59 yo F with DM admitted to [facility] for evaluation of symptomatic fever and cough, and evaluation of positive COVID results. she  was febrile, tachycardic, lactic acid of 3 with a white count of 13.. sodium of 132  CXR significant for patchy infiltrate disease  patient received: tylenol 650mg once , ceftriaxone azithromycin , tessalon snadra for cough + 1.4  bolus liter of fluid    PAST MEDICAL & SURGICAL HISTORY:  High cholesterol  Neuropathy  Diabetes type 2   No significant past surgical history      MEDICATIONS  (STANDING):  ALBUTerol    90 MICROgram(s) HFA Inhaler 2 Puff(s) Inhalation every 4 hours  ascorbic acid 500 milliGRAM(s) Oral every 12 hours  dextrose 5%. 1000 milliLiter(s) (50 mL/Hr) IV Continuous <Continuous>  dextrose 50% Injectable 12.5 Gram(s) IV Push once  dextrose 50% Injectable 25 Gram(s) IV Push once  dextrose 50% Injectable 25 Gram(s) IV Push once  enoxaparin Injectable 40 milliGRAM(s) SubCutaneous daily  insulin lispro (HumaLOG) corrective regimen sliding scale   SubCutaneous three times a day before meals  nitrofurantoin monohydrate/macrocrystals (MACROBID) 100 milliGRAM(s) Oral two times a day with meals  sodium chloride 0.65% Nasal 1 Spray(s) Both Nostrils two times a day  zinc sulfate 220 milliGRAM(s) Oral daily  Allergies    hydrocodone (Hives)    Intolerances        Daily     Daily     Vital Signs Last 24 Hrs  T(C): 36.8 (01 Apr 2020 08:43), Max: 37.1 (31 Mar 2020 15:20)  T(F): 98.3 (01 Apr 2020 08:43), Max: 98.8 (31 Mar 2020 15:20)  HR: 100 (01 Apr 2020 11:05) (89 - 100)  BP: 141/83 (01 Apr 2020 08:43) (141/83 - 156/83)  BP(mean): --  RR: 16 (01 Apr 2020 08:43) (16 - 19)  SpO2: 91% (01 Apr 2020 11:05) (91% - 96%)    04-01    138  |  100  |  16  ----------------------------<  301<H>  4.6   |  28  |  0.81    Ca    9.4      01 Apr 2020 07:14  Phos  4.2     04-01  Mg     1.7     04-01    TPro  8.0  /  Alb  2.4<L>  /  TBili  0.6  /  DBili  x   /  AST  35  /  ALT  47  /  AlkPhos  140<H>  03-31      Hemoglobin A1C, Whole Blood: 8.6 % (03-29-20 @ 12:59)       Anion Gap, Serum: 10 mmol/L (04-01-20 @ 07:14)  eGFR if Non African American: 79 mL/min/1.73M2 (04-01-20 @ 07:14)  Ketone - Urine: Trace <!> (03-31-20 @ 20:41)      CAPILLARY BLOOD GLUCOSE      POCT Blood Glucose.: 360 mg/dL (01 Apr 2020 11:36)  POCT Blood Glucose.: 271 mg/dL (01 Apr 2020 08:03)  POCT Blood Glucose.: 347 mg/dL (31 Mar 2020 21:07)  POCT Blood Glucose.: 219 mg/dL (31 Mar 2020 16:48)      DIET: Consistent Carbohydrates

## 2020-04-01 NOTE — PROGRESS NOTE ADULT - ASSESSMENT
59 yo F with DM who presents with symptomatic fever and cough, in the context of positive COVID results.       Bilateral PNA likely due to Novel coronavirus  Fever well-controlled but patient still has O2 saturation which dipped to 89% at rest on room air, resolved with trial of albuterol  vitamin C 500 q12h  continue zinc  albuterol prn  Encouraged Incentive spirometry  encouraged patient to ambulate in room    Dysuria  symptomatic improvement on macrobid and pyridium, will complete regimen at home  urine culture pending    Epistaxis  likely 2/2 oxygen use  nasal saline BID, will continue for a few days PRN after discharge    Diabetes  Insulin sliding scale    Asthma  continue albuterol PRN  advised pt to followup with PCP and continue nebulizer treatments at home    code: full  diet:  diabetes  dvt ppx: lovnenox

## 2020-04-01 NOTE — DISCHARGE NOTE NURSING/CASE MANAGEMENT/SOCIAL WORK - PATIENT PORTAL LINK FT
You can access the FollowMyHealth Patient Portal offered by [facility] by registering at the following website: [email or web address]. By joining [employer]’s FollowMyHealth portal, you will also be able to view your health information using other applications (apps) compatible with our system.

## 2020-04-02 PROBLEM — Z86.73 PERSONAL HISTORY OF TRANSIENT ISCHEMIC ATTACK (TIA), AND CEREBRAL INFARCTION WITHOUT RESIDUAL DEFICITS: Chronic | Status: ACTIVE | Noted: 2020-03-25

## 2020-04-02 PROBLEM — I26.99 OTHER PULMONARY EMBOLISM WITHOUT ACUTE COR PULMONALE: Chronic | Status: ACTIVE | Noted: 2020-03-25

## 2020-04-02 PROBLEM — F41.9 ANXIETY DISORDER, UNSPECIFIED: Chronic | Status: ACTIVE | Noted: 2020-03-25

## 2020-04-02 PROBLEM — K21.9 GASTRO-ESOPHAGEAL REFLUX DISEASE WITHOUT ESOPHAGITIS: Chronic | Status: ACTIVE | Noted: 2020-03-25

## 2020-04-02 RX ORDER — CYCLOBENZAPRINE HYDROCHLORIDE 10 MG/1
1 TABLET, FILM COATED ORAL
Qty: 0 | Refills: 0 | DISCHARGE

## 2020-04-02 RX ORDER — METHENAMINE MANDELATE 1 G
1 TABLET ORAL
Qty: 0 | Refills: 0 | DISCHARGE

## 2020-04-02 RX ORDER — INSULIN LISPRO 100 [IU]/ML
20 INJECTION, SUSPENSION SUBCUTANEOUS
Qty: 3 | Refills: 0
Start: 2020-04-02 | End: 2020-05-01

## 2020-04-06 ENCOUNTER — APPOINTMENT (OUTPATIENT)
Dept: RHEUMATOLOGY | Facility: CLINIC | Age: 60
End: 2020-04-06

## 2020-04-08 LAB
CULTURE RESULTS: SIGNIFICANT CHANGE UP
SPECIMEN SOURCE: SIGNIFICANT CHANGE UP

## 2020-04-26 ENCOUNTER — RX RENEWAL (OUTPATIENT)
Age: 60
End: 2020-04-26

## 2020-04-27 DIAGNOSIS — Z71.89 OTHER SPECIFIED COUNSELING: ICD-10-CM

## 2020-04-27 PROBLEM — G62.9 POLYNEUROPATHY, UNSPECIFIED: Chronic | Status: ACTIVE | Noted: 2020-03-28

## 2020-04-27 PROBLEM — E11.9 TYPE 2 DIABETES MELLITUS WITHOUT COMPLICATIONS: Chronic | Status: ACTIVE | Noted: 2020-03-28

## 2020-04-27 PROBLEM — E78.00 PURE HYPERCHOLESTEROLEMIA, UNSPECIFIED: Chronic | Status: ACTIVE | Noted: 2020-03-28

## 2020-05-13 ENCOUNTER — RX RENEWAL (OUTPATIENT)
Age: 60
End: 2020-05-13

## 2020-05-18 ENCOUNTER — APPOINTMENT (OUTPATIENT)
Dept: RHEUMATOLOGY | Facility: CLINIC | Age: 60
End: 2020-05-18

## 2020-06-03 ENCOUNTER — RX RENEWAL (OUTPATIENT)
Age: 60
End: 2020-06-03

## 2020-06-15 ENCOUNTER — LABORATORY RESULT (OUTPATIENT)
Age: 60
End: 2020-06-15

## 2020-06-17 ENCOUNTER — APPOINTMENT (OUTPATIENT)
Dept: RHEUMATOLOGY | Facility: CLINIC | Age: 60
End: 2020-06-17
Payer: MEDICAID

## 2020-06-17 VITALS
HEART RATE: 108 BPM | BODY MASS INDEX: 30.23 KG/M2 | OXYGEN SATURATION: 98 % | TEMPERATURE: 97.1 F | DIASTOLIC BLOOD PRESSURE: 70 MMHG | WEIGHT: 154 LBS | HEIGHT: 60 IN | SYSTOLIC BLOOD PRESSURE: 140 MMHG

## 2020-06-17 DIAGNOSIS — Z86.718 PERSONAL HISTORY OF OTHER VENOUS THROMBOSIS AND EMBOLISM: ICD-10-CM

## 2020-06-17 DIAGNOSIS — Z86.711 PERSONAL HISTORY OF PULMONARY EMBOLISM: ICD-10-CM

## 2020-06-17 DIAGNOSIS — M17.10 UNILATERAL PRIMARY OSTEOARTHRITIS, UNSPECIFIED KNEE: ICD-10-CM

## 2020-06-17 LAB
ALBUMIN SERPL ELPH-MCNC: 4.4 G/DL
ALP BLD-CCNC: 138 U/L
ALT SERPL-CCNC: 18 U/L
ANION GAP SERPL CALC-SCNC: 13 MMOL/L
AST SERPL-CCNC: 22 U/L
BASOPHILS # BLD AUTO: 0.04 K/UL
BASOPHILS NFR BLD AUTO: 0.4 %
BILIRUB SERPL-MCNC: 0.2 MG/DL
BUN SERPL-MCNC: 13 MG/DL
CALCIUM SERPL-MCNC: 10 MG/DL
CALCIUM SERPL-MCNC: 10.1 MG/DL
CHLORIDE SERPL-SCNC: 103 MMOL/L
CO2 SERPL-SCNC: 26 MMOL/L
CREAT SERPL-MCNC: 0.76 MG/DL
CRP SERPL-MCNC: 0.57 MG/DL
EOSINOPHIL # BLD AUTO: 0.39 K/UL
EOSINOPHIL NFR BLD AUTO: 3.7 %
ERYTHROCYTE [SEDIMENTATION RATE] IN BLOOD BY WESTERGREN METHOD: 36 MM/HR
GLUCOSE SERPL-MCNC: 157 MG/DL
HCT VFR BLD CALC: 40.7 %
HGB BLD-MCNC: 13.3 G/DL
IMM GRANULOCYTES NFR BLD AUTO: 0.3 %
LYMPHOCYTES # BLD AUTO: 4.58 K/UL
LYMPHOCYTES NFR BLD AUTO: 43 %
MAN DIFF?: NORMAL
MCHC RBC-ENTMCNC: 30 PG
MCHC RBC-ENTMCNC: 32.7 GM/DL
MCV RBC AUTO: 91.7 FL
MONOCYTES # BLD AUTO: 0.67 K/UL
MONOCYTES NFR BLD AUTO: 6.3 %
NEUTROPHILS # BLD AUTO: 4.94 K/UL
NEUTROPHILS NFR BLD AUTO: 46.3 %
PARATHYROID HORMONE INTACT: 35 PG/ML
PLATELET # BLD AUTO: 308 K/UL
POTASSIUM SERPL-SCNC: 4.8 MMOL/L
PROT SERPL-MCNC: 7.1 G/DL
RBC # BLD: 4.44 M/UL
RBC # FLD: 14.4 %
SODIUM SERPL-SCNC: 142 MMOL/L
WBC # FLD AUTO: 10.65 K/UL

## 2020-06-17 PROCEDURE — 99214 OFFICE O/P EST MOD 30 MIN: CPT | Mod: 25

## 2020-06-17 PROCEDURE — 96401 CHEMO ANTI-NEOPL SQ/IM: CPT

## 2020-06-17 PROCEDURE — 20610 DRAIN/INJ JOINT/BURSA W/O US: CPT | Mod: LT

## 2020-06-17 RX ORDER — MULTIVITAMIN/IRON/FOLIC ACID 18MG-0.4MG
600-400 TABLET ORAL
Qty: 60 | Refills: 1 | Status: DISCONTINUED | COMMUNITY
Start: 2020-02-19 | End: 2020-06-17

## 2020-06-17 RX ORDER — METHYLPRED ACET/NACL,ISO-OS/PF 40 MG/ML
40 VIAL (ML) INJECTION
Qty: 1 | Refills: 0 | Status: COMPLETED | OUTPATIENT
Start: 2020-06-17

## 2020-06-17 RX ORDER — DENOSUMAB 60 MG/ML
60 INJECTION SUBCUTANEOUS
Qty: 1 | Refills: 0 | Status: COMPLETED | OUTPATIENT
Start: 2020-06-17

## 2020-06-17 RX ADMIN — METHYLPREDNISOLONE ACETATE 40 MG/ML: 40 INJECTION, SUSPENSION INTRA-ARTICULAR; INTRALESIONAL; INTRAMUSCULAR; SOFT TISSUE at 00:00

## 2020-06-17 RX ADMIN — DENOSUMAB 0 MG/ML: 60 INJECTION SUBCUTANEOUS at 00:00

## 2020-06-17 NOTE — ASSESSMENT
[FreeTextEntry1] : 60-year-old female, here for follow up w/ joint aches throughout the hands/MCPs/ PIPs/ bilaterally w/ synovitis w/ xray of b/l hands reading small well marginated erosion along proximal ulnar articular margin of 3rd DIP joint w/ Seronegative arthritis w/ raised ESR w/ concern for reactive arthritis given hx of recurrent UTIs; versus Seronegative RA in the differential w/ hx of dry eyes on drops w/ her Optho.\par \par Seronegative Arthritis: \par -lost to f/u since 2/2020 due to covid19 she reports and states she has just been taking sulfasalazine 500mg 1 tab daily (instead of BID)\par -reviewed labs 6/15/2020 w/ high ESR= 36 (from 40); high CRP=0.57; CBC w/ mildly high WBC=10.65 (Nl up to 10.50; denies any signs of infection of malignancy and likely reactive to arthritis and will monitor); CMP ok\par -had hx of recurrent UTIs & knows to treat UTIs immediately w/ PCP and urologist and will monitor; last UA w/ culture was contaminated \par -discussed r/b/s of increasing Sulfasalazine 500mg 1 tab daily to 1 tab PO BID x 1 week then 2 tabs AM and 1 tab PM until f/u w/ G6PD NL w/ pt agreeable and prescription sent as below\par -xray b/l hands 2/3/2020 reading small well marginated erosion along proximal ulnar articular margin of 3rd DIP joint\par -dry eyes: on drops w/ her Optho she reports\par -dry mouth: biotene mouthwash or toothpaste PRN \par \par Lt shoulder pain/bursitis: lately for the past few weeks\par -offered Depomedrol 40mg injection today and patient agreeable.\par -Has decent ROM in b/l shoulders, no pelvic/girdle stiffness and able to stand up without using her hands, no temporal pain/unremitting headaches, no vision changes, no jaw pain.\par \par 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 and + [id number] IgM >150 high titer; LAC negative 6/15/2020\par -on ASA 81mg daily\par -referred to Heme to discuss if needs AC and need to continue ASA; forgot to go from last visit and referral given again today \par -discussed APLS panel should be repeated in 3 mo\par -GUILLERMINA w/ IF neg w/o clinical symptoms of SLE at this time \par \par LBP: intermittent \par -xray LS spine 2/3/2020 w/ DDD; SI normal \par -Motrin PRN w/ food needed sparingly helps\par \par Knee OA: xray b/l knees 2/3/2020 read as OA chagnes Rt knee\par -discussed she can consider steroid injection as needed \par \par Intermittent numbness/tingling: intermittent in hands and feet likely 2/2 to DM on insulin.\par -metabolic labs folate, B12, TSH normal for it 1/2020\par -states she takes gabapentin BID and will bring in next visit to give dose of it \par -knows if persistent can consider EMG w/ Neuro\par \par Osteoporosis: on Dexa 2/3/2020 w/ worse t-score -3.1 at spine\par -postmenopausal, no fractures; FH of it in sister\par -defers Fosamax bc states she has GERD and worries about compliance w/ bisphosphate \par -reviewed labs 6/15/2020 w/ Nl Ca=10; Nl parathyroid hormone \par -discussed r/b/s of Prolia subQ q 6mo w/ pt agreeable and denies any recent extractions or implants \par -given Prolia 60mg/ml lot # [id number] exp 06/22 subQ to Rt upper arm and tolerated well \par -discussed to continue Ca+vitD supplementation \par -encouraged weight bearing exercises as tolerated.\par \par -educated on symptoms to monitor for in detail and alert us if any concerns.\par -knows to stay up to date on health maintenance w/ PCP\par -encouraged compliance\par -f/u in 6 weeks w/ labs or sooner as needed

## 2020-06-17 NOTE — REVIEW OF SYSTEMS
[As Noted in HPI] : as noted in HPI [Joint Swelling] : joint swelling [Joint Pain] : joint pain [Fever] : no fever [Red Eyes] : eyes not red [Chills] : no chills [Eye Pain] : no eye pain [Chest Pain] : no chest pain [Earache] : no earache [Nosebleeds] : no nosebleeds [Shortness Of Breath] : no shortness of breath [Vomiting] : no vomiting [Abdominal Pain] : no abdominal pain [Dysuria] : no dysuria [Skin Lesions] : no skin lesions [Convulsions] : no convulsions [Confused] : no confusion [Suicidal] : not suicidal [Proptosis] : no proptosis [Muscle Weakness] : no muscle weakness [Easy Bleeding] : no tendency for easy bleeding

## 2020-06-17 NOTE — PROCEDURE
[Today's Date:] : Date: [unfilled] [Risks] : risks [Alternatives] : alternatives [Benefits] : benefits [Patient] : Prior to the start of the procedure a time out was taken and the identity of the patient was confirmed via name and date of birth with the patient. The correct site and the procedure to be performed were confirmed. The correct side was confirmed if applicable. The availability of the correct equipment was verified [Consent Obtained] : written consent was obtained prior to the procedure and is detailed in the patient's record [#1 Site: ______] : #1 site identified in the [unfilled] [Therapeutic] : therapeutic [Ethyl Chloride] : ethyl chloride [Alcohol] : alcohol [Betadine] : betadine solution [___ml 1% Lidocaine] : [unfilled] ml of 1% lidocaine [Depomedrol ___ mg] : Depomedrol [unfilled] mg [22 gauge 1.5 inch] : A 22 gauge 1.5 inch needle was used [#2 Site: ___] : # 2 site identified in the [unfilled] [No Complications] : there were no complications [Tolerated Well] : the patient tolerated the procedure well [de-identified] : Prolia lot # [id number] exp 06/22 [FreeTextEntry5] : x2

## 2020-06-17 NOTE — REASON FOR VISIT
[Follow-Up: _____] : a [unfilled] follow-up visit [[employer] ] : provided by [employer]   [FreeTextEntry1] : [id number] [FreeTextEntry2] : MELITON  [TWNoteComboBox1] : [place]

## 2020-06-17 NOTE — PHYSICAL EXAM
[General Appearance - Alert] : alert [General Appearance - Well Nourished] : well nourished [Extraocular Movements] : extraocular movements were intact [Sclera] : the sclera and conjunctiva were normal [Outer Ear] : the ears and nose were normal in appearance [Neck Appearance] : the appearance of the neck was normal [Nasal Cavity] : the nasal mucosa and septum were normal [Auscultation Breath Sounds / Voice Sounds] : lungs were clear to auscultation bilaterally [Respiration, Rhythm And Depth] : normal respiratory rhythm and effort [Bowel Sounds] : normal bowel sounds [Heart Rate And Rhythm] : heart rate was normal and rhythm regular [Heart Sounds] : normal S1 and S2 [Abdomen Soft] : soft [Cervical Lymph Nodes Enlarged Anterior Bilaterally] : anterior cervical [Abdomen Tenderness] : non-tender [No CVA Tenderness] : no ~M costovertebral angle tenderness [Supraclavicular Lymph Nodes Enlarged Bilaterally] : supraclavicular [Motor Tone] : muscle strength and tone were normal [] : no rash [Cranial Nerves] : cranial nerves 2-12 were intact [Impaired Insight] : insight and judgment were intact [No Focal Deficits] : no focal deficits [Mood] : the mood was normal [FreeTextEntry1] : tenderness in the Rt hand 2nd MCP and DIP; tenderness in the LT hand 2-3MCP and 3-5PIP; LT>Rt wrist tenderness w/o effusion; Lt shoulder tenderness w/ rotation and abduction w/ decent ROM; no Rt shoulder tenderness w/ full ROM; able to stand up w/o using her hands

## 2020-07-01 ENCOUNTER — RX RENEWAL (OUTPATIENT)
Age: 60
End: 2020-07-01

## 2020-07-09 ENCOUNTER — EMERGENCY (EMERGENCY)
Facility: HOSPITAL | Age: 60
LOS: 0 days | Discharge: HOME | End: 2020-07-09
Attending: EMERGENCY MEDICINE
Payer: MEDICAID

## 2020-07-09 VITALS
HEART RATE: 91 BPM | TEMPERATURE: 98 F | RESPIRATION RATE: 18 BRPM | DIASTOLIC BLOOD PRESSURE: 69 MMHG | SYSTOLIC BLOOD PRESSURE: 129 MMHG | OXYGEN SATURATION: 99 %

## 2020-07-09 VITALS — WEIGHT: 199.96 LBS | HEIGHT: 65 IN

## 2020-07-09 DIAGNOSIS — E78.00 PURE HYPERCHOLESTEROLEMIA, UNSPECIFIED: ICD-10-CM

## 2020-07-09 DIAGNOSIS — I10 ESSENTIAL (PRIMARY) HYPERTENSION: ICD-10-CM

## 2020-07-09 DIAGNOSIS — R20.2 PARESTHESIA OF SKIN: ICD-10-CM

## 2020-07-09 DIAGNOSIS — Z86.73 PERSONAL HISTORY OF TRANSIENT ISCHEMIC ATTACK (TIA), AND CEREBRAL INFARCTION WITHOUT RESIDUAL DEFICITS: ICD-10-CM

## 2020-07-09 DIAGNOSIS — Z88.0 ALLERGY STATUS TO PENICILLIN: ICD-10-CM

## 2020-07-09 DIAGNOSIS — Z88.5 ALLERGY STATUS TO NARCOTIC AGENT: ICD-10-CM

## 2020-07-09 DIAGNOSIS — G62.9 POLYNEUROPATHY, UNSPECIFIED: ICD-10-CM

## 2020-07-09 DIAGNOSIS — K21.9 GASTRO-ESOPHAGEAL REFLUX DISEASE WITHOUT ESOPHAGITIS: ICD-10-CM

## 2020-07-09 DIAGNOSIS — Z98.890 OTHER SPECIFIED POSTPROCEDURAL STATES: Chronic | ICD-10-CM

## 2020-07-09 DIAGNOSIS — M25.9 JOINT DISORDER, UNSPECIFIED: Chronic | ICD-10-CM

## 2020-07-09 DIAGNOSIS — Z95.828 PRESENCE OF OTHER VASCULAR IMPLANTS AND GRAFTS: Chronic | ICD-10-CM

## 2020-07-09 DIAGNOSIS — F41.9 ANXIETY DISORDER, UNSPECIFIED: ICD-10-CM

## 2020-07-09 DIAGNOSIS — Z79.82 LONG TERM (CURRENT) USE OF ASPIRIN: ICD-10-CM

## 2020-07-09 DIAGNOSIS — E55.9 VITAMIN D DEFICIENCY, UNSPECIFIED: ICD-10-CM

## 2020-07-09 DIAGNOSIS — E78.5 HYPERLIPIDEMIA, UNSPECIFIED: ICD-10-CM

## 2020-07-09 DIAGNOSIS — E11.3592 TYPE 2 DIABETES MELLITUS WITH PROLIFERATIVE DIABETIC RETINOPATHY WITHOUT MACULAR EDEMA, LEFT EYE: Chronic | ICD-10-CM

## 2020-07-09 DIAGNOSIS — E87.6 HYPOKALEMIA: ICD-10-CM

## 2020-07-09 DIAGNOSIS — R20.0 ANESTHESIA OF SKIN: ICD-10-CM

## 2020-07-09 DIAGNOSIS — Z86.718 PERSONAL HISTORY OF OTHER VENOUS THROMBOSIS AND EMBOLISM: ICD-10-CM

## 2020-07-09 DIAGNOSIS — Z98.51 TUBAL LIGATION STATUS: Chronic | ICD-10-CM

## 2020-07-09 DIAGNOSIS — Z86.711 PERSONAL HISTORY OF PULMONARY EMBOLISM: ICD-10-CM

## 2020-07-09 DIAGNOSIS — Z79.4 LONG TERM (CURRENT) USE OF INSULIN: ICD-10-CM

## 2020-07-09 DIAGNOSIS — E11.9 TYPE 2 DIABETES MELLITUS WITHOUT COMPLICATIONS: ICD-10-CM

## 2020-07-09 DIAGNOSIS — M19.90 UNSPECIFIED OSTEOARTHRITIS, UNSPECIFIED SITE: ICD-10-CM

## 2020-07-09 LAB
ALBUMIN SERPL ELPH-MCNC: 3.8 G/DL — SIGNIFICANT CHANGE UP (ref 3.3–5)
ALP SERPL-CCNC: 153 U/L — HIGH (ref 40–120)
ALT FLD-CCNC: 29 U/L — SIGNIFICANT CHANGE UP (ref 12–78)
ANION GAP SERPL CALC-SCNC: 5 MMOL/L — SIGNIFICANT CHANGE UP (ref 5–17)
APTT BLD: 30.4 SEC — SIGNIFICANT CHANGE UP (ref 27.5–35.5)
AST SERPL-CCNC: 21 U/L — SIGNIFICANT CHANGE UP (ref 15–37)
BASOPHILS # BLD AUTO: 0 K/UL — SIGNIFICANT CHANGE UP (ref 0–0.2)
BASOPHILS NFR BLD AUTO: 0 % — SIGNIFICANT CHANGE UP (ref 0–2)
BILIRUB SERPL-MCNC: 0.3 MG/DL — SIGNIFICANT CHANGE UP (ref 0.2–1.2)
BUN SERPL-MCNC: 10 MG/DL — SIGNIFICANT CHANGE UP (ref 7–23)
CALCIUM SERPL-MCNC: 8.5 MG/DL — SIGNIFICANT CHANGE UP (ref 8.5–10.1)
CHLORIDE SERPL-SCNC: 107 MMOL/L — SIGNIFICANT CHANGE UP (ref 96–108)
CK SERPL-CCNC: 102 U/L — SIGNIFICANT CHANGE UP (ref 26–192)
CO2 SERPL-SCNC: 27 MMOL/L — SIGNIFICANT CHANGE UP (ref 22–31)
CREAT SERPL-MCNC: 0.94 MG/DL — SIGNIFICANT CHANGE UP (ref 0.5–1.3)
EOSINOPHIL # BLD AUTO: 0.45 K/UL — SIGNIFICANT CHANGE UP (ref 0–0.5)
EOSINOPHIL NFR BLD AUTO: 4 % — SIGNIFICANT CHANGE UP (ref 0–6)
GLUCOSE SERPL-MCNC: 153 MG/DL — HIGH (ref 70–99)
HCT VFR BLD CALC: 40.5 % — SIGNIFICANT CHANGE UP (ref 34.5–45)
HGB BLD-MCNC: 13.3 G/DL — SIGNIFICANT CHANGE UP (ref 11.5–15.5)
INR BLD: 0.84 RATIO — LOW (ref 0.88–1.16)
LYMPHOCYTES # BLD AUTO: 5.58 K/UL — HIGH (ref 1–3.3)
LYMPHOCYTES # BLD AUTO: 50 % — HIGH (ref 13–44)
MAGNESIUM SERPL-MCNC: 1.9 MG/DL — SIGNIFICANT CHANGE UP (ref 1.6–2.6)
MCHC RBC-ENTMCNC: 30.3 PG — SIGNIFICANT CHANGE UP (ref 27–34)
MCHC RBC-ENTMCNC: 32.8 GM/DL — SIGNIFICANT CHANGE UP (ref 32–36)
MCV RBC AUTO: 92.3 FL — SIGNIFICANT CHANGE UP (ref 80–100)
MONOCYTES # BLD AUTO: 0.11 K/UL — SIGNIFICANT CHANGE UP (ref 0–0.9)
MONOCYTES NFR BLD AUTO: 1 % — LOW (ref 2–14)
NEUTROPHILS # BLD AUTO: 4.91 K/UL — SIGNIFICANT CHANGE UP (ref 1.8–7.4)
NEUTROPHILS NFR BLD AUTO: 44 % — SIGNIFICANT CHANGE UP (ref 43–77)
NRBC # BLD: SIGNIFICANT CHANGE UP /100 WBCS (ref 0–0)
PLATELET # BLD AUTO: 266 K/UL — SIGNIFICANT CHANGE UP (ref 150–400)
POTASSIUM SERPL-MCNC: 3.4 MMOL/L — LOW (ref 3.5–5.3)
POTASSIUM SERPL-SCNC: 3.4 MMOL/L — LOW (ref 3.5–5.3)
PROT SERPL-MCNC: 7.8 GM/DL — SIGNIFICANT CHANGE UP (ref 6–8.3)
PROTHROM AB SERPL-ACNC: 9.9 SEC — LOW (ref 10.6–13.6)
RBC # BLD: 4.39 M/UL — SIGNIFICANT CHANGE UP (ref 3.8–5.2)
RBC # FLD: 13.9 % — SIGNIFICANT CHANGE UP (ref 10.3–14.5)
SODIUM SERPL-SCNC: 139 MMOL/L — SIGNIFICANT CHANGE UP (ref 135–145)
TROPONIN I SERPL-MCNC: <0.015 NG/ML — SIGNIFICANT CHANGE UP (ref 0.01–0.04)
WBC # BLD: 11.15 K/UL — HIGH (ref 3.8–10.5)
WBC # FLD AUTO: 11.15 K/UL — HIGH (ref 3.8–10.5)

## 2020-07-09 PROCEDURE — 86850 RBC ANTIBODY SCREEN: CPT

## 2020-07-09 PROCEDURE — 85730 THROMBOPLASTIN TIME PARTIAL: CPT

## 2020-07-09 PROCEDURE — 82550 ASSAY OF CK (CPK): CPT

## 2020-07-09 PROCEDURE — 70496 CT ANGIOGRAPHY HEAD: CPT

## 2020-07-09 PROCEDURE — 93005 ELECTROCARDIOGRAM TRACING: CPT

## 2020-07-09 PROCEDURE — 84484 ASSAY OF TROPONIN QUANT: CPT

## 2020-07-09 PROCEDURE — 70498 CT ANGIOGRAPHY NECK: CPT

## 2020-07-09 PROCEDURE — 70498 CT ANGIOGRAPHY NECK: CPT | Mod: 26

## 2020-07-09 PROCEDURE — G0426: CPT | Mod: 95

## 2020-07-09 PROCEDURE — 36415 COLL VENOUS BLD VENIPUNCTURE: CPT

## 2020-07-09 PROCEDURE — 70496 CT ANGIOGRAPHY HEAD: CPT | Mod: 26

## 2020-07-09 PROCEDURE — 99284 EMERGENCY DEPT VISIT MOD MDM: CPT | Mod: 25

## 2020-07-09 PROCEDURE — 99285 EMERGENCY DEPT VISIT HI MDM: CPT

## 2020-07-09 PROCEDURE — 93010 ELECTROCARDIOGRAM REPORT: CPT

## 2020-07-09 PROCEDURE — 85025 COMPLETE CBC W/AUTO DIFF WBC: CPT

## 2020-07-09 PROCEDURE — 86900 BLOOD TYPING SEROLOGIC ABO: CPT

## 2020-07-09 PROCEDURE — 86901 BLOOD TYPING SEROLOGIC RH(D): CPT

## 2020-07-09 PROCEDURE — 82962 GLUCOSE BLOOD TEST: CPT

## 2020-07-09 PROCEDURE — 80053 COMPREHEN METABOLIC PANEL: CPT

## 2020-07-09 PROCEDURE — 85610 PROTHROMBIN TIME: CPT

## 2020-07-09 PROCEDURE — 83735 ASSAY OF MAGNESIUM: CPT

## 2020-07-09 PROCEDURE — 70450 CT HEAD/BRAIN W/O DYE: CPT

## 2020-07-09 NOTE — ED STATDOCS - PROGRESS NOTE DETAILS
Gregory Cardozo for attending Dr. Albarado: 61 y/o female with a PMHx of anxiety, constipation, DM2, DJD, DVT off blood thinners, GERD, HLD, HTN, neuropathy, PE, TIA, presents to the ED c/o numbness. Pt reports facial numbness and tingling x5 hours and LE numbness x1 hour. Code stroke called. Will send pt to main ED for further evaluation. Gregory Cardozo for attending Dr. Albarado: 59 y/o female with a PMHx of anxiety, constipation, DM2, DJD, DVT off blood thinners, GERD, HLD, HTN, neuropathy, PE, TIA, presents to the ED c/o numbness. Pt reports facial numbness and tingling x5 hours and LUE numbness x1 hour. Code stroke called. Will send pt to main ED for further evaluation.

## 2020-07-09 NOTE — ED PROVIDER NOTE - PSH
[place] filter in place  left  H/O tubal ligation    History of cystoscopy  history  nephrolithiasis and recurrent UTI S/P ESWL 5/2016  History of loop recorder    No significant past surgical history    Proliferative diabetic retinopathy of left eye  s/p PPV/laser/silicone  5/31/16 and 11/29/16 OS  Shoulder disorder  left

## 2020-07-09 NOTE — ED ADULT NURSE NOTE - CHPI ED NUR SYMPTOMS NEG
no dizziness/no confusion/no blurred vision/no weakness/no fever/no nausea/no change in level of consciousness/no vomiting/no loss of consciousness/no numbness

## 2020-07-09 NOTE — ED PROVIDER NOTE - OBJECTIVE STATEMENT
61 y/o female with a PMHx of anxiety, constipation, DM2, DJD, DVT, GERD, HLD, HTN, neuropathy, PE, TIA, presents to the ED c/o periorbital +numbness beginning at 4 PM, now improving. Also endorses +LUE tingling also improving. No fever. Takes ASA. Allergic to hydrocodone, oxycodone, and penicillin. PCP: Dr. Cesar Castro Theodosia  used, ID#: [id number]

## 2020-07-09 NOTE — ED PROVIDER NOTE - PROGRESS NOTE DETAILS
Gregory HOPKINS for ED attending, Dr. Thacker: Spoke with radiologist CT negative for hemorrhage and CTA negative for large vessel occulusion. Symptoms improving , started at 4 PM and then worsening at 7pm. Not tpa candidate. Discussed with Dr. Ovalle and will speak with telestroke doctor over telemonitor. Gregory HOPKINS for ED attending, Dr. Thacker: Delay in pt HPI, ROS, and physical exam 2/2 pt speaking Turkish and having to use . Gregory HOPKINS for ED attending, Dr. Thacker: Delay getting telestroke doctor on telecart.

## 2020-07-09 NOTE — ED ADULT NURSE NOTE - CHIEF COMPLAINT QUOTE
Pt c/o left arm numbess for since 4pm and facial numbness starting 1 hour PTA to ED. Pt evaluated by Dr Albarado at triage, code stroke called at 20:59. Pt Hx DM. LAMS score 0, pt  strength equal bilateral, no facial droop noted at triage. Pt taken directly to CT scan

## 2020-07-09 NOTE — PROGRESS NOTE ADULT - SUBJECTIVE AND OBJECTIVE BOX
HISTORY OF PRESENT ILLNESS:  Ms. Gorman  (MRN [id number])  is 60y right female with stroke risk factors of  HLD, HTN , blind left eye and visually impaired in R eye .anxiety, constipation, DM2, DJD, DVT, GERD, neuropathy, PE, TIA, presents to the ED c/o periorbital +numbness beginning at 4 PM, now improving. Also endorses +LUE tingling also improving. No fever. Takes ASA. Allergic to hydrocodone, oxycodone, and penicillin.      PAST MEDICAL HISTORY:  High cholesterol  Neuropathy  Diabetes  Anxiety  GERD (gastroesophageal reflux disease)  History of TIA (transient ischemic attack) and stroke  Pulmonary embolism  HLD (hyperlipidemia)  Vitamin D deficiency  Constipation  Syncope  DJD (degenerative joint disease)  Dvt femoral (deep venous thrombosis)  Endogenous hyperlipemia  Hypertension  Type 2 diabetes mellitus  Anxiety  GERD (gastroesophageal reflux disease)  History of TIA (transient ischemic attack) and stroke  Pulmonary embolism  No significant past surgical history  History of cystoscopy  History of loop recorder  Shoulder disorder  Proliferative diabetic retinopathy of left eye  Johnathan filter in place  H/O tubal ligation  left arm numbness    HOME MEDICATIONS:  Home Medications:  acetaminophen 325 mg oral tablet: 2 tab(s) orally every 4 hours, As needed, Temp greater or equal to 38C (100.4F) (02 Apr 2020 11:26)  ascorbic acid 500 mg oral tablet: 1 tab(s) orally every 12 hours (02 Apr 2020 11:26)  aspirin 81 mg oral delayed release tablet: 1 tab(s) orally once a day (02 Apr 2020 11:26)  atorvastatin 20 mg oral tablet: 1 tab(s) orally once a day (02 Apr 2020 11:26)  Besivance 0.6% ophthalmic suspension: 1 drop(s) in the left eye 3 times a day (22 Jul 2018 17:07)  Calcium 600+D oral tablet: 1 tab(s) orally 2 times a day (02 Apr 2020 11:26)  cyclobenzaprine 5 mg oral tablet: 1 tab(s) orally once a day, As Needed (22 Jul 2018 17:07)  cyclobenzaprine 5 mg oral tablet: 1 tab(s) orally 2 times a day, As needed, Muscle Spasm (02 Apr 2020 11:26)  docusate potassium 100 mg oral capsule: 1 cap(s) orally 2 times a day (22 Jul 2018 17:07)  dorzolamide-timolol 2%-0.5% preservative-free ophthalmic solution: 1 drop(s) in the right eye 2 times a day (22 Jul 2018 17:07)  gabapentin 300 mg oral capsule: 1 cap(s) orally 3 times a day, As Needed (02 Apr 2020 11:26)  gabapentin 300 mg oral tablet: 1 tab(s) orally 3 times a day, As Needed (22 Jul 2018 17:07)  guaiFENesin 100 mg/5 mL oral liquid: 10 milliliter(s) orally every 6 hours, As needed, Cough (02 Apr 2020 11:26)  Ilevro 0.3% ophthalmic suspension: 1 drop(s) in the left eye once a day (22 Jul 2018 17:07)  Lipitor 20 mg oral tablet: 1 tab(s) orally once a day (at bedtime) (22 Jul 2018 17:07)  lisinopril 10 mg oral tablet: 1 tab(s) orally once a day (22 Jul 2018 17:07)  metFORMIN 500 mg oral tablet: 2 tab(s) orally 2 times a day (02 Apr 2020 11:26)  omeprazole 20 mg oral delayed release capsule: 1 cap(s) orally once a day (02 Apr 2020 11:26)  omeprazole 20 mg oral delayed release capsule: 1 cap(s) orally 2 times a day (22 Jul 2018 17:07)  prednisoLONE acetate 1% ophthalmic suspension: 1 application in the right eye 2 times a day (22 Jul 2018 17:07)  sulfaSALAzine 500 mg oral tablet: 1 tab(s) orally 2 times a day (02 Apr 2020 11:26)  Systane ophthalmic solution: 1 drop(s) to each affected eye 2 times a day (22 Jul 2018 17:07)      FAMILY HISTORY:    SOCIAL HISTORY:    ALLERGIES:  hydrocodone (Hives)  oxycodone (Urticaria; Rash)  penicillin (Rash)      VITALS/DATA/ORDERS: [x] Reviewed  Vital Signs Last 24 Hrs  T(C): 36.7 (09 Jul 2020 22:07), Max: 36.7 (09 Jul 2020 21:22)  T(F): 98.1 (09 Jul 2020 22:07), Max: 98.1 (09 Jul 2020 22:07)  HR: 95 (09 Jul 2020 22:07) (92 - 95)  BP: 136/61 (09 Jul 2020 22:07) (124/71 - 137/71)  BP(mean): 80 (09 Jul 2020 22:07) (80 - 87)  RR: 18 (09 Jul 2020 22:07) (18 - 18)  SpO2: 100% (09 Jul 2020 22:07) (100% - 100%)                        13.3   11.15 )-----------( 266      ( 09 Jul 2020 21:03 )             40.5     07-09    139  |  107  |  10  ----------------------------<  153<H>  3.4<L>   |  27  |  0.94    Ca    8.5      09 Jul 2020 21:03  Mg     1.9     07-09    TPro  7.8  /  Alb  3.8  /  TBili  0.3  /  DBili  x   /  AST  21  /  ALT  29  /  AlkPhos  153<H>  07-09    PT/INR - ( 09 Jul 2020 21:03 )   PT: 9.9 sec;   INR: 0.84 ratio         PTT - ( 09 Jul 2020 21:03 )  PTT:30.4 sec      POCT Blood Glucose.: 180 mg/dL (09 Jul 2020 21:00)    CT Head:  No evidence of acute hemorrhage or infarct  CTA of brain and neck- no large vessel occlusion in the B/L ICA , MCA and ALONDRA territory, the B/L vertebrals are patent and basilar artery is patent      EXAMINATION: Assisted by Dr Thacker and Nurse RAMON  NIHSS: 0    1A: Level of consciousness       0= Alert; keenly responsive  1B: Ask month and age       0= Both questions right  1C: "Blink eyes" and "Squeeze Hands"       0= Performs both    2: Horizontal EOMs       0= Normal     3: Visual fields- patient is blind in left eye ( unable to test) and blinks to threat  in R eye       0= No visual loss    4: Facial palsy (use grimace if obtunded)       0= Normal symmetry    5A: Left arm motor drift (count out loud and use fingers to show count)       0= No drift x 10 seconds    5B: Right arm motor drift       0= No drift x 10 second    6A: Left leg motor drift       0= No drift x 10 seconds      6B: Right leg motor drift       0= No drift x 10 seconds    7: Limb ataxia (FNF/heel-shin)       0= No ataxia    8: Sensation       0= Normal, no sensory loss    9: Language/aphasia- exam was limited due to patients poor vison was able to name >2/3 of objects yet had difficulty viewing the pictures with her chronic visual disturbance. No evidence of aphasia        0= Normal, no aphasia    10: Dysarthria-       0= Normal    11: Extinction/inattention       0= No abnormality    Patients  59 yo female with hx of anxiety and multiple medical problems  presents with atypical facial , leo-oral paresthesias leading to arm parathesias. NIHSS scale=0 , CT/CTA with no evidence of stroke or vessel occlusion in the anterior / posterior  circulation,  would not make her a candidate for rTPA or any need for thrombectomy.  I believe her symptoms were neuropsychiatric .     Plan discussed with ___.

## 2020-07-09 NOTE — ED ADULT NURSE NOTE - OBJECTIVE STATEMENT
Pt presents to ED complaining of left arm numbness since 1600 today. Pt presents to ED complaining of left arm numbness since 1600 today. Patient saying that her arm and face have been numb. Upon exam pt equal bilateral strength.   No change in LOC, no confusion, no NVD. Patient saying her arm is not numb right now. Patient states that she is blind/near blind bilateral eyes. GCS 15. NIH SS completed.

## 2020-07-09 NOTE — ED ADULT TRIAGE NOTE - CHIEF COMPLAINT QUOTE
Pt c/o left arm numbess for since 4pm and facial numbness for 1 hour. Pt evaluated by Dr Albarado at triage, code stroke called at 20:59. Pt Hx DM. LAMS score 0, pt  strength equal bilateral, no facial droop noted at triage. Pt taken directly to CT scan Pt c/o left arm numbess for since 4pm and facial numbness starting 1 hour PTA to ED. Pt evaluated by Dr Albarado at triage, code stroke called at 20:59. Pt Hx DM. LAMS score 0, pt  strength equal bilateral, no facial droop noted at triage. Pt taken directly to CT scan

## 2020-07-09 NOTE — ED PROVIDER NOTE - CLINICAL SUMMARY MEDICAL DECISION MAKING FREE TEXT BOX
CT and CTA negative.  Mild hypokalemia, however, rest of labs unremarkable.  VS WNL.  Patient feeling much improved.  Discussed with neurology telestroke, believes patient has neuropsychiatric component, and does not believe patient has vascular cause of symptoms.  Discussed with patient and daughter, okay for d/c home at this time.

## 2020-07-09 NOTE — ED PROVIDER NOTE - PATIENT PORTAL LINK FT
You can access the FollowMyHealth Patient Portal offered by [facility] by registering at the following website: [email or web address]. By joining [employer]’s FollowMyHealth portal, you will also be able to view your health information using other applications (apps) compatible with our system.

## 2020-07-09 NOTE — ED STATDOCS - PMH
Anxiety    Constipation    Diabetes    DJD (degenerative joint disease)    Dvt femoral (deep venous thrombosis)  left leg in 2014  GERD (gastroesophageal reflux disease)    High cholesterol    History of TIA (transient ischemic attack) and stroke    HLD (hyperlipidemia)    Hypertension    Neuropathy    Pulmonary embolism    Syncope    Type 2 diabetes mellitus    Vitamin D deficiency

## 2020-07-20 ENCOUNTER — OUTPATIENT (OUTPATIENT)
Dept: OUTPATIENT SERVICES | Facility: HOSPITAL | Age: 60
LOS: 1 days | End: 2020-07-20
Payer: MEDICAID

## 2020-07-20 ENCOUNTER — TRANSCRIPTION ENCOUNTER (OUTPATIENT)
Age: 60
End: 2020-07-20

## 2020-07-20 DIAGNOSIS — Z98.51 TUBAL LIGATION STATUS: Chronic | ICD-10-CM

## 2020-07-20 DIAGNOSIS — Z11.59 ENCOUNTER FOR SCREENING FOR OTHER VIRAL DISEASES: ICD-10-CM

## 2020-07-20 DIAGNOSIS — E11.3592 TYPE 2 DIABETES MELLITUS WITH PROLIFERATIVE DIABETIC RETINOPATHY WITHOUT MACULAR EDEMA, LEFT EYE: Chronic | ICD-10-CM

## 2020-07-20 DIAGNOSIS — M25.9 JOINT DISORDER, UNSPECIFIED: Chronic | ICD-10-CM

## 2020-07-20 DIAGNOSIS — Z95.828 PRESENCE OF OTHER VASCULAR IMPLANTS AND GRAFTS: Chronic | ICD-10-CM

## 2020-07-20 DIAGNOSIS — Z98.890 OTHER SPECIFIED POSTPROCEDURAL STATES: Chronic | ICD-10-CM

## 2020-07-20 LAB — SARS-COV-2 RNA SPEC QL NAA+PROBE: SIGNIFICANT CHANGE UP

## 2020-07-20 PROCEDURE — 87635 SARS-COV-2 COVID-19 AMP PRB: CPT

## 2020-07-20 NOTE — ASU PATIENT PROFILE, ADULT - VISION (WITH CORRECTIVE LENSES IF THE PATIENT USUALLY WEARS THEM):
Severely impaired: cannot locate objects without hearing or touching them or patient nonresponsive./Right eye distorted, left no vision

## 2020-07-20 NOTE — ASU PATIENT PROFILE, ADULT - PMH
Anxiety    Cataract  b/l  Constipation    Diabetes    DJD (degenerative joint disease)    DM (diabetes mellitus)    Dvt femoral (deep venous thrombosis)  left leg in 2014  GERD (gastroesophageal reflux disease)    High cholesterol    History of TIA (transient ischemic attack) and stroke    HLD (hyperlipidemia)    Hypertension    Insomnia    Neuropathy    Neuropathy    Pseudophakia    Pulmonary embolism    Syncope    Type 2 diabetes mellitus    Vitamin D deficiency

## 2020-07-21 ENCOUNTER — OUTPATIENT (OUTPATIENT)
Dept: OUTPATIENT SERVICES | Facility: HOSPITAL | Age: 60
LOS: 1 days | End: 2020-07-21
Payer: MEDICAID

## 2020-07-21 VITALS
HEART RATE: 103 BPM | SYSTOLIC BLOOD PRESSURE: 126 MMHG | DIASTOLIC BLOOD PRESSURE: 65 MMHG | RESPIRATION RATE: 17 BRPM | OXYGEN SATURATION: 95 %

## 2020-07-21 VITALS
HEIGHT: 60 IN | WEIGHT: 151.24 LBS | RESPIRATION RATE: 14 BRPM | SYSTOLIC BLOOD PRESSURE: 128 MMHG | OXYGEN SATURATION: 99 % | HEART RATE: 88 BPM | DIASTOLIC BLOOD PRESSURE: 66 MMHG | TEMPERATURE: 98 F

## 2020-07-21 DIAGNOSIS — Z95.828 PRESENCE OF OTHER VASCULAR IMPLANTS AND GRAFTS: Chronic | ICD-10-CM

## 2020-07-21 DIAGNOSIS — E11.3592 TYPE 2 DIABETES MELLITUS WITH PROLIFERATIVE DIABETIC RETINOPATHY WITHOUT MACULAR EDEMA, LEFT EYE: Chronic | ICD-10-CM

## 2020-07-21 DIAGNOSIS — H40.212 ACUTE ANGLE-CLOSURE GLAUCOMA, LEFT EYE: ICD-10-CM

## 2020-07-21 DIAGNOSIS — M25.9 JOINT DISORDER, UNSPECIFIED: Chronic | ICD-10-CM

## 2020-07-21 DIAGNOSIS — Z98.51 TUBAL LIGATION STATUS: Chronic | ICD-10-CM

## 2020-07-21 DIAGNOSIS — Z98.890 OTHER SPECIFIED POSTPROCEDURAL STATES: Chronic | ICD-10-CM

## 2020-07-21 DIAGNOSIS — Z98.49 CATARACT EXTRACTION STATUS, UNSPECIFIED EYE: Chronic | ICD-10-CM

## 2020-07-21 PROCEDURE — 67036 REMOVAL OF INNER EYE FLUID: CPT | Mod: AS

## 2020-07-21 PROCEDURE — 82962 GLUCOSE BLOOD TEST: CPT

## 2020-07-21 PROCEDURE — 67036 REMOVAL OF INNER EYE FLUID: CPT | Mod: LT

## 2020-07-21 NOTE — ASU DISCHARGE PLAN (ADULT/PEDIATRIC) - CALL YOUR DOCTOR IF YOU HAVE ANY OF THE FOLLOWING:
Pain not relieved by Medications Swelling that gets worse/Pain not relieved by Medications/Fever greater than (need to indicate Fahrenheit or Celsius)/Bleeding that does not stop/Nausea and vomiting that does not stop

## 2020-07-29 ENCOUNTER — APPOINTMENT (OUTPATIENT)
Dept: RHEUMATOLOGY | Facility: CLINIC | Age: 60
End: 2020-07-29

## 2020-08-09 ENCOUNTER — EMERGENCY (EMERGENCY)
Facility: HOSPITAL | Age: 60
LOS: 0 days | Discharge: HOME | End: 2020-08-09
Attending: EMERGENCY MEDICINE
Payer: MEDICAID

## 2020-08-09 VITALS
RESPIRATION RATE: 17 BRPM | OXYGEN SATURATION: 97 % | SYSTOLIC BLOOD PRESSURE: 142 MMHG | TEMPERATURE: 98 F | DIASTOLIC BLOOD PRESSURE: 71 MMHG | HEART RATE: 78 BPM

## 2020-08-09 VITALS — WEIGHT: 164.02 LBS

## 2020-08-09 DIAGNOSIS — Z98.890 OTHER SPECIFIED POSTPROCEDURAL STATES: Chronic | ICD-10-CM

## 2020-08-09 DIAGNOSIS — F41.9 ANXIETY DISORDER, UNSPECIFIED: ICD-10-CM

## 2020-08-09 DIAGNOSIS — Z95.828 PRESENCE OF OTHER VASCULAR IMPLANTS AND GRAFTS: Chronic | ICD-10-CM

## 2020-08-09 DIAGNOSIS — Z86.711 PERSONAL HISTORY OF PULMONARY EMBOLISM: ICD-10-CM

## 2020-08-09 DIAGNOSIS — Z98.51 TUBAL LIGATION STATUS: Chronic | ICD-10-CM

## 2020-08-09 DIAGNOSIS — I10 ESSENTIAL (PRIMARY) HYPERTENSION: ICD-10-CM

## 2020-08-09 DIAGNOSIS — H53.8 OTHER VISUAL DISTURBANCES: ICD-10-CM

## 2020-08-09 DIAGNOSIS — R10.9 UNSPECIFIED ABDOMINAL PAIN: ICD-10-CM

## 2020-08-09 DIAGNOSIS — K59.00 CONSTIPATION, UNSPECIFIED: ICD-10-CM

## 2020-08-09 DIAGNOSIS — E78.5 HYPERLIPIDEMIA, UNSPECIFIED: ICD-10-CM

## 2020-08-09 DIAGNOSIS — E11.3592 TYPE 2 DIABETES MELLITUS WITH PROLIFERATIVE DIABETIC RETINOPATHY WITHOUT MACULAR EDEMA, LEFT EYE: Chronic | ICD-10-CM

## 2020-08-09 DIAGNOSIS — M25.9 JOINT DISORDER, UNSPECIFIED: Chronic | ICD-10-CM

## 2020-08-09 DIAGNOSIS — R10.2 PELVIC AND PERINEAL PAIN: ICD-10-CM

## 2020-08-09 DIAGNOSIS — Z98.49 CATARACT EXTRACTION STATUS, UNSPECIFIED EYE: Chronic | ICD-10-CM

## 2020-08-09 DIAGNOSIS — K21.9 GASTRO-ESOPHAGEAL REFLUX DISEASE WITHOUT ESOPHAGITIS: ICD-10-CM

## 2020-08-09 DIAGNOSIS — Z88.5 ALLERGY STATUS TO NARCOTIC AGENT: ICD-10-CM

## 2020-08-09 DIAGNOSIS — Z86.718 PERSONAL HISTORY OF OTHER VENOUS THROMBOSIS AND EMBOLISM: ICD-10-CM

## 2020-08-09 DIAGNOSIS — Z79.4 LONG TERM (CURRENT) USE OF INSULIN: ICD-10-CM

## 2020-08-09 DIAGNOSIS — E11.40 TYPE 2 DIABETES MELLITUS WITH DIABETIC NEUROPATHY, UNSPECIFIED: ICD-10-CM

## 2020-08-09 DIAGNOSIS — Z86.73 PERSONAL HISTORY OF TRANSIENT ISCHEMIC ATTACK (TIA), AND CEREBRAL INFARCTION WITHOUT RESIDUAL DEFICITS: ICD-10-CM

## 2020-08-09 DIAGNOSIS — Z88.0 ALLERGY STATUS TO PENICILLIN: ICD-10-CM

## 2020-08-09 PROBLEM — Z96.1 PRESENCE OF INTRAOCULAR LENS: Chronic | Status: ACTIVE | Noted: 2020-07-21

## 2020-08-09 PROBLEM — G47.00 INSOMNIA, UNSPECIFIED: Chronic | Status: ACTIVE | Noted: 2020-07-21

## 2020-08-09 PROBLEM — H26.9 UNSPECIFIED CATARACT: Chronic | Status: ACTIVE | Noted: 2020-07-21

## 2020-08-09 PROBLEM — E11.9 TYPE 2 DIABETES MELLITUS WITHOUT COMPLICATIONS: Chronic | Status: ACTIVE | Noted: 2020-07-21

## 2020-08-09 PROBLEM — G62.9 POLYNEUROPATHY, UNSPECIFIED: Chronic | Status: ACTIVE | Noted: 2020-07-21

## 2020-08-09 LAB
ADD ON TEST-SPECIMEN IN LAB: SIGNIFICANT CHANGE UP
ALBUMIN SERPL ELPH-MCNC: 3.7 G/DL — SIGNIFICANT CHANGE UP (ref 3.3–5)
ALP SERPL-CCNC: 102 U/L — SIGNIFICANT CHANGE UP (ref 40–120)
ALT FLD-CCNC: 35 U/L — SIGNIFICANT CHANGE UP (ref 12–78)
ANION GAP SERPL CALC-SCNC: 5 MMOL/L — SIGNIFICANT CHANGE UP (ref 5–17)
APPEARANCE UR: CLEAR — SIGNIFICANT CHANGE UP
AST SERPL-CCNC: 30 U/L — SIGNIFICANT CHANGE UP (ref 15–37)
BACTERIA # UR AUTO: ABNORMAL
BASOPHILS # BLD AUTO: 0.04 K/UL — SIGNIFICANT CHANGE UP (ref 0–0.2)
BASOPHILS NFR BLD AUTO: 0.4 % — SIGNIFICANT CHANGE UP (ref 0–2)
BILIRUB SERPL-MCNC: 0.3 MG/DL — SIGNIFICANT CHANGE UP (ref 0.2–1.2)
BILIRUB UR-MCNC: NEGATIVE — SIGNIFICANT CHANGE UP
BUN SERPL-MCNC: 9 MG/DL — SIGNIFICANT CHANGE UP (ref 7–23)
CALCIUM SERPL-MCNC: 9.6 MG/DL — SIGNIFICANT CHANGE UP (ref 8.5–10.1)
CHLORIDE SERPL-SCNC: 105 MMOL/L — SIGNIFICANT CHANGE UP (ref 96–108)
CO2 SERPL-SCNC: 31 MMOL/L — SIGNIFICANT CHANGE UP (ref 22–31)
COLOR SPEC: YELLOW — SIGNIFICANT CHANGE UP
CREAT SERPL-MCNC: 0.8 MG/DL — SIGNIFICANT CHANGE UP (ref 0.5–1.3)
DIFF PNL FLD: NEGATIVE — SIGNIFICANT CHANGE UP
EOSINOPHIL # BLD AUTO: 0.34 K/UL — SIGNIFICANT CHANGE UP (ref 0–0.5)
EOSINOPHIL NFR BLD AUTO: 3.2 % — SIGNIFICANT CHANGE UP (ref 0–6)
EPI CELLS # UR: SIGNIFICANT CHANGE UP
GLUCOSE BLDC GLUCOMTR-MCNC: 200 MG/DL — HIGH (ref 70–99)
GLUCOSE SERPL-MCNC: 216 MG/DL — HIGH (ref 70–99)
GLUCOSE UR QL: 50 MG/DL
HCT VFR BLD CALC: 41.8 % — SIGNIFICANT CHANGE UP (ref 34.5–45)
HGB BLD-MCNC: 13.3 G/DL — SIGNIFICANT CHANGE UP (ref 11.5–15.5)
IMM GRANULOCYTES NFR BLD AUTO: 0.3 % — SIGNIFICANT CHANGE UP (ref 0–1.5)
KETONES UR-MCNC: NEGATIVE — SIGNIFICANT CHANGE UP
LACTATE SERPL-SCNC: 1.2 MMOL/L — SIGNIFICANT CHANGE UP (ref 0.7–2)
LACTATE SERPL-SCNC: 2.3 MMOL/L — HIGH (ref 0.7–2)
LEUKOCYTE ESTERASE UR-ACNC: ABNORMAL
LYMPHOCYTES # BLD AUTO: 4.72 K/UL — HIGH (ref 1–3.3)
LYMPHOCYTES # BLD AUTO: 45 % — HIGH (ref 13–44)
MCHC RBC-ENTMCNC: 29.6 PG — SIGNIFICANT CHANGE UP (ref 27–34)
MCHC RBC-ENTMCNC: 31.8 GM/DL — LOW (ref 32–36)
MCV RBC AUTO: 92.9 FL — SIGNIFICANT CHANGE UP (ref 80–100)
MONOCYTES # BLD AUTO: 0.85 K/UL — SIGNIFICANT CHANGE UP (ref 0–0.9)
MONOCYTES NFR BLD AUTO: 8.1 % — SIGNIFICANT CHANGE UP (ref 2–14)
NEUTROPHILS # BLD AUTO: 4.5 K/UL — SIGNIFICANT CHANGE UP (ref 1.8–7.4)
NEUTROPHILS NFR BLD AUTO: 43 % — SIGNIFICANT CHANGE UP (ref 43–77)
NITRITE UR-MCNC: NEGATIVE — SIGNIFICANT CHANGE UP
PH UR: 7 — SIGNIFICANT CHANGE UP (ref 5–8)
PLATELET # BLD AUTO: 299 K/UL — SIGNIFICANT CHANGE UP (ref 150–400)
POTASSIUM SERPL-MCNC: 4.4 MMOL/L — SIGNIFICANT CHANGE UP (ref 3.5–5.3)
POTASSIUM SERPL-SCNC: 4.4 MMOL/L — SIGNIFICANT CHANGE UP (ref 3.5–5.3)
PROT SERPL-MCNC: 7.7 GM/DL — SIGNIFICANT CHANGE UP (ref 6–8.3)
PROT UR-MCNC: NEGATIVE MG/DL — SIGNIFICANT CHANGE UP
RBC # BLD: 4.5 M/UL — SIGNIFICANT CHANGE UP (ref 3.8–5.2)
RBC # FLD: 13.5 % — SIGNIFICANT CHANGE UP (ref 10.3–14.5)
RBC CASTS # UR COMP ASSIST: NEGATIVE /HPF — SIGNIFICANT CHANGE UP (ref 0–4)
SODIUM SERPL-SCNC: 141 MMOL/L — SIGNIFICANT CHANGE UP (ref 135–145)
SP GR SPEC: 1.01 — SIGNIFICANT CHANGE UP (ref 1.01–1.02)
UROBILINOGEN FLD QL: NEGATIVE MG/DL — SIGNIFICANT CHANGE UP
WBC # BLD: 10.48 K/UL — SIGNIFICANT CHANGE UP (ref 3.8–10.5)
WBC # FLD AUTO: 10.48 K/UL — SIGNIFICANT CHANGE UP (ref 3.8–10.5)
WBC UR QL: SIGNIFICANT CHANGE UP

## 2020-08-09 PROCEDURE — 99285 EMERGENCY DEPT VISIT HI MDM: CPT

## 2020-08-09 PROCEDURE — 36415 COLL VENOUS BLD VENIPUNCTURE: CPT

## 2020-08-09 PROCEDURE — 87186 SC STD MICRODIL/AGAR DIL: CPT

## 2020-08-09 PROCEDURE — 81001 URINALYSIS AUTO W/SCOPE: CPT

## 2020-08-09 PROCEDURE — 83615 LACTATE (LD) (LDH) ENZYME: CPT

## 2020-08-09 PROCEDURE — 74177 CT ABD & PELVIS W/CONTRAST: CPT | Mod: 26

## 2020-08-09 PROCEDURE — 96361 HYDRATE IV INFUSION ADD-ON: CPT

## 2020-08-09 PROCEDURE — 96374 THER/PROPH/DIAG INJ IV PUSH: CPT | Mod: XU

## 2020-08-09 PROCEDURE — 99284 EMERGENCY DEPT VISIT MOD MDM: CPT | Mod: 25

## 2020-08-09 PROCEDURE — 85025 COMPLETE CBC W/AUTO DIFF WBC: CPT

## 2020-08-09 PROCEDURE — 80053 COMPREHEN METABOLIC PANEL: CPT

## 2020-08-09 PROCEDURE — 74177 CT ABD & PELVIS W/CONTRAST: CPT

## 2020-08-09 PROCEDURE — 87086 URINE CULTURE/COLONY COUNT: CPT

## 2020-08-09 PROCEDURE — 83605 ASSAY OF LACTIC ACID: CPT

## 2020-08-09 PROCEDURE — 82962 GLUCOSE BLOOD TEST: CPT

## 2020-08-09 RX ORDER — KETOROLAC TROMETHAMINE 30 MG/ML
30 SYRINGE (ML) INJECTION ONCE
Refills: 0 | Status: DISCONTINUED | OUTPATIENT
Start: 2020-08-09 | End: 2020-08-09

## 2020-08-09 RX ORDER — SODIUM CHLORIDE 9 MG/ML
1000 INJECTION INTRAMUSCULAR; INTRAVENOUS; SUBCUTANEOUS ONCE
Refills: 0 | Status: COMPLETED | OUTPATIENT
Start: 2020-08-09 | End: 2020-08-09

## 2020-08-09 RX ADMIN — SODIUM CHLORIDE 1000 MILLILITER(S): 9 INJECTION INTRAMUSCULAR; INTRAVENOUS; SUBCUTANEOUS at 14:22

## 2020-08-09 RX ADMIN — SODIUM CHLORIDE 1000 MILLILITER(S): 9 INJECTION INTRAMUSCULAR; INTRAVENOUS; SUBCUTANEOUS at 13:29

## 2020-08-09 RX ADMIN — Medication 30 MILLIGRAM(S): at 15:18

## 2020-08-09 RX ADMIN — Medication 30 MILLIGRAM(S): at 15:40

## 2020-08-09 RX ADMIN — SODIUM CHLORIDE 1000 MILLILITER(S): 9 INJECTION INTRAMUSCULAR; INTRAVENOUS; SUBCUTANEOUS at 13:22

## 2020-08-09 RX ADMIN — SODIUM CHLORIDE 1000 MILLILITER(S): 9 INJECTION INTRAMUSCULAR; INTRAVENOUS; SUBCUTANEOUS at 14:29

## 2020-08-09 NOTE — ED STATDOCS - PATIENT PORTAL LINK FT
You can access the FollowMyHealth Patient Portal offered by [facility] by registering at the following website: [email or web address]. By joining [employer]’s FollowMyHealth portal, you will also be able to view your health information using other applications (apps) compatible with our system.

## 2020-08-09 NOTE — ED STATDOCS - OBJECTIVE STATEMENT
60 YOF PMHx syncope, DMT2, HTN, HLD, GERD, DVT, PE, TIA, neuropathy, anxiety presents to ED for pelvis pain, dysuria x 3 weeks. Pt was found to have UTI, reports 5 day course of ABx without relief. Symptoms present despite Abx. Associated with constipation x8 days. Denies fever. Pt feels now her abd is inflamed and distended.

## 2020-08-09 NOTE — ED STATDOCS - CARE PLAN
Principal Discharge DX:	Abdominal pain Principal Discharge DX:	Abdominal pain  Secondary Diagnosis:	Blurry vision, bilateral

## 2020-08-09 NOTE — ED STATDOCS - PROGRESS NOTE DETAILS
60 yr. old female PMH: presents to ED with pelvic pain, dysuria onset 3 weeks ago. Had 5 day course of antibiotics without relief. Also has constipation for 8 days. No fever. Seen and examined by attending in intake. Plan: IV, labs, CT abd./pelvis Will F/U with results and re evaluate. Seymour GARCIA VA unable VA unable to perform due to blurry vision unable to read chart. MTangredkendra NP Refused to be transferred to [facility]  for further evaluation of blurry vision agreed to F/U with her Ophthomologist. . CT results noted and discussed with patient.  Discussed at length with patient via  her vision changes.  Denies blindness to L eye, but reports worsening blurriness and R eye is blind at baseline.  Able to identify large objects and discern different colors.  I recommended eval at [facility] by ophthalmology, but patient reports she has her own private ophtho and will f/u tomorrow.  Understands that I would like eval more emergently, but declines and understands risks.  Pt otherwise well appearing.  D/c home with strict return precautions and prompt outpatient f/u.

## 2020-08-09 NOTE — ED STATDOCS - NSFOLLOWUPINSTRUCTIONS_ED_ALL_ED_FT
[facility]    Dolor abdominal en los adultos  Abdominal Pain, Adult  El dolor en el abdomen (dolor abdominal) puede tener muchas causas. A menudo, el dolor abdominal no es grave y mejora sin tratamiento o con tratamiento en la casa. Sin embargo, a veces el dolor abdominal es grave.  El médico le hará preguntas sobre sandra antecedentes médicos y le hará un examen físico para tratar de determinar la causa del dolor abdominal.  Siga estas instrucciones en antoine casa:     Medicamentos     Use los medicamentos de venta chai y los recetados solamente mahsa se lo haya indicado el médico.No tome un laxante a menos que se lo haya indicado el médico.Instrucciones generales     Controle antoine afección para detectar cualquier cambio.Liliya suficiente líquido mahsa para mantener la orina de color amarillo pálido.Concurra a todas las visitas de seguimiento mahsa se lo haya indicado el médico. [place] es importante.Comuníquese con un médico si:  El dolor abdominal cambia o empeora.No tiene apetito o baja de peso sin proponérselo.Está estreñido o tiene diarrea nima más de 2 o 3 días.Tiene dolor cuando orina o defeca.El dolor abdominal lo despierta de noche.El dolor empeora con las comidas, después de comer o con determinados alimentos.Tiene vómitos y no puede retener nada de lo que ingiere.Tiene fiebre.Observa arianna en la orina.Solicite ayuda inmediatamente si:  El dolor no desaparece tan pronto mahsa el médico le dijo que era esperable.No puede dejar de vomitar.El dolor se siente solo en zonas del abdomen, mahsa el lado derecho o la parte inferior izquierda del abdomen. Si se localiza en la stefanie derecha, posiblemente podría tratarse de apendicitis.Las heces son sanguinolentas o de color leonie, o de aspecto alquitranado.Tiene dolor intenso, cólicos o distensión abdominal.Tiene signos de deshidratación, mahsa los siguientes:  Orina de color oscuro, muy escasa o falta de orina.Labios agrietados.Sequedad de boca.Ojos hundidos.Somnolencia.Debilidad.Tiene dificultad para respirar o dolor en el pecho.Resumen  A menudo, el dolor abdominal no es grave y mejora sin tratamiento o con tratamiento en la casa. Sin embargo, a veces el dolor abdominal es grave.Controle antoine afección para detectar cualquier cambio.Use los medicamentos de venta chai y los recetados solamente mahsa se lo haya indicado el médico.Comuníquese con un médico si el dolor abdominal cambia o empeora.Busque ayuda de inmediato si tiene dolor intenso, cólicos o distensión abdominal.Esta información no tiene mahsa fin reemplazar el consejo del médico. Asegúrese de hacerle al médico cualquier pregunta que tenga.    Rest. Drink plenty of fluids. Tylenol 650 mg. PO every 4 hrs. for pain. Follow up with Gynecologist and gastroenterologist.   Follow up with Ophthamolgist to further evaluate vision.

## 2020-08-09 NOTE — ED STATDOCS - CARE PROVIDER_API CALL
Dereje Blackmon)  Gastroenterology; Internal Medicine  [address] [place] [address]  Phone: [phone]  Fax: [phone]  Follow Up Time:     [place] [address]  Phone: [phone]  Fax: [phone]  Follow Up Time:

## 2020-08-09 NOTE — ED STATDOCS - NSFOLLOWUPCLINICS_GEN_ALL_ED_FT
[facility]  Family Medicine  [address] [place] [address]  Phone: [phone]  Fax:   Follow Up Time:

## 2020-08-09 NOTE — ED STATDOCS - PSH
Asotin filter in place  left  H/O tubal ligation    History of cystoscopy  history  nephrolithiasis and recurrent UTI S/P ESWL 5/2016  History of loop recorder    Proliferative diabetic retinopathy of left eye  s/p PPV/laser/silicone  5/31/16 and 11/29/16 OS  S/P cataract surgery  b/l  Shoulder disorder  left

## 2020-08-09 NOTE — ED ADULT TRIAGE NOTE - CHIEF COMPLAINT QUOTE
pt c/o pelvic pain with blating, burnign with urination and urinary retntion for over three weeks. pt palced on abx three weeks ago but stopped aking them. pt denies fever. pt denies hematuria, pt is visually impaired and  guies patient

## 2020-08-11 LAB
-  AMIKACIN: SIGNIFICANT CHANGE UP
-  AMIKACIN: SIGNIFICANT CHANGE UP
-  AMOXICILLIN/CLAVULANIC ACID: SIGNIFICANT CHANGE UP
-  AMOXICILLIN/CLAVULANIC ACID: SIGNIFICANT CHANGE UP
-  AMPICILLIN/SULBACTAM: SIGNIFICANT CHANGE UP
-  AMPICILLIN/SULBACTAM: SIGNIFICANT CHANGE UP
-  AMPICILLIN: SIGNIFICANT CHANGE UP
-  AMPICILLIN: SIGNIFICANT CHANGE UP
-  AZTREONAM: SIGNIFICANT CHANGE UP
-  AZTREONAM: SIGNIFICANT CHANGE UP
-  CEFAZOLIN: SIGNIFICANT CHANGE UP
-  CEFAZOLIN: SIGNIFICANT CHANGE UP
-  CEFEPIME: SIGNIFICANT CHANGE UP
-  CEFEPIME: SIGNIFICANT CHANGE UP
-  CEFOXITIN: SIGNIFICANT CHANGE UP
-  CEFOXITIN: SIGNIFICANT CHANGE UP
-  CEFTRIAXONE: SIGNIFICANT CHANGE UP
-  CEFTRIAXONE: SIGNIFICANT CHANGE UP
-  CIPROFLOXACIN: SIGNIFICANT CHANGE UP
-  CIPROFLOXACIN: SIGNIFICANT CHANGE UP
-  ERTAPENEM: SIGNIFICANT CHANGE UP
-  ERTAPENEM: SIGNIFICANT CHANGE UP
-  GENTAMICIN: SIGNIFICANT CHANGE UP
-  GENTAMICIN: SIGNIFICANT CHANGE UP
-  IMIPENEM: SIGNIFICANT CHANGE UP
-  IMIPENEM: SIGNIFICANT CHANGE UP
-  LEVOFLOXACIN: SIGNIFICANT CHANGE UP
-  LEVOFLOXACIN: SIGNIFICANT CHANGE UP
-  MEROPENEM: SIGNIFICANT CHANGE UP
-  MEROPENEM: SIGNIFICANT CHANGE UP
-  NITROFURANTOIN: SIGNIFICANT CHANGE UP
-  NITROFURANTOIN: SIGNIFICANT CHANGE UP
-  PIPERACILLIN/TAZOBACTAM: SIGNIFICANT CHANGE UP
-  PIPERACILLIN/TAZOBACTAM: SIGNIFICANT CHANGE UP
-  TIGECYCLINE: SIGNIFICANT CHANGE UP
-  TIGECYCLINE: SIGNIFICANT CHANGE UP
-  TOBRAMYCIN: SIGNIFICANT CHANGE UP
-  TOBRAMYCIN: SIGNIFICANT CHANGE UP
-  TRIMETHOPRIM/SULFAMETHOXAZOLE: SIGNIFICANT CHANGE UP
-  TRIMETHOPRIM/SULFAMETHOXAZOLE: SIGNIFICANT CHANGE UP
CULTURE RESULTS: SIGNIFICANT CHANGE UP
METHOD TYPE: SIGNIFICANT CHANGE UP
METHOD TYPE: SIGNIFICANT CHANGE UP
ORGANISM # SPEC MICROSCOPIC CNT: SIGNIFICANT CHANGE UP
SPECIMEN SOURCE: SIGNIFICANT CHANGE UP

## 2020-08-11 RX ORDER — NITROFURANTOIN MACROCRYSTAL 50 MG
1 CAPSULE ORAL
Qty: 14 | Refills: 0
Start: 2020-08-11 | End: 2020-08-17

## 2020-08-11 NOTE — ED POST DISCHARGE NOTE - RESULT SUMMARY
+UC Contacted patient with results of UC and agreed to take Rx. Macrobid. Daughter trranslated in Belarusian. Seymour NP +UC Contacted patient with results of UC and agreed to take Rx. Macrobid. Daughter translated in Marshallese. Seymoru GARCIA

## 2020-08-20 ENCOUNTER — OUTPATIENT (OUTPATIENT)
Dept: OUTPATIENT SERVICES | Facility: HOSPITAL | Age: 60
LOS: 1 days | End: 2020-08-20
Payer: MEDICAID

## 2020-08-20 ENCOUNTER — RX RENEWAL (OUTPATIENT)
Age: 60
End: 2020-08-20

## 2020-08-20 ENCOUNTER — APPOINTMENT (OUTPATIENT)
Dept: ULTRASOUND IMAGING | Facility: CLINIC | Age: 60
End: 2020-08-20
Payer: MEDICAID

## 2020-08-20 DIAGNOSIS — M25.9 JOINT DISORDER, UNSPECIFIED: Chronic | ICD-10-CM

## 2020-08-20 DIAGNOSIS — E11.3592 TYPE 2 DIABETES MELLITUS WITH PROLIFERATIVE DIABETIC RETINOPATHY WITHOUT MACULAR EDEMA, LEFT EYE: Chronic | ICD-10-CM

## 2020-08-20 DIAGNOSIS — Z98.890 OTHER SPECIFIED POSTPROCEDURAL STATES: Chronic | ICD-10-CM

## 2020-08-20 DIAGNOSIS — Z00.8 ENCOUNTER FOR OTHER GENERAL EXAMINATION: ICD-10-CM

## 2020-08-20 DIAGNOSIS — Z95.828 PRESENCE OF OTHER VASCULAR IMPLANTS AND GRAFTS: Chronic | ICD-10-CM

## 2020-08-20 DIAGNOSIS — Z98.49 CATARACT EXTRACTION STATUS, UNSPECIFIED EYE: Chronic | ICD-10-CM

## 2020-08-20 DIAGNOSIS — Z98.51 TUBAL LIGATION STATUS: Chronic | ICD-10-CM

## 2020-08-20 PROCEDURE — 76830 TRANSVAGINAL US NON-OB: CPT | Mod: 26

## 2020-08-20 PROCEDURE — 76856 US EXAM PELVIC COMPLETE: CPT

## 2020-08-20 PROCEDURE — 76830 TRANSVAGINAL US NON-OB: CPT

## 2020-08-20 PROCEDURE — 76856 US EXAM PELVIC COMPLETE: CPT | Mod: 26

## 2020-08-26 ENCOUNTER — RX RENEWAL (OUTPATIENT)
Age: 60
End: 2020-08-26

## 2020-09-03 ENCOUNTER — RX RENEWAL (OUTPATIENT)
Age: 60
End: 2020-09-03

## 2020-09-27 ENCOUNTER — RX RENEWAL (OUTPATIENT)
Age: 60
End: 2020-09-27

## 2020-09-27 RX ORDER — CALCIUM CARBONATE/VITAMIN D3 600 MG-10
600-400 TABLET ORAL
Qty: 60 | Refills: 1 | Status: ACTIVE | COMMUNITY
Start: 2020-09-03 | End: 1900-01-01

## 2020-10-13 ENCOUNTER — LABORATORY RESULT (OUTPATIENT)
Age: 60
End: 2020-10-13

## 2020-10-14 ENCOUNTER — APPOINTMENT (OUTPATIENT)
Dept: RHEUMATOLOGY | Facility: CLINIC | Age: 60
End: 2020-10-14
Payer: MEDICAID

## 2020-10-14 VITALS
BODY MASS INDEX: 30.82 KG/M2 | OXYGEN SATURATION: 98 % | HEART RATE: 87 BPM | SYSTOLIC BLOOD PRESSURE: 120 MMHG | WEIGHT: 157 LBS | TEMPERATURE: 96.7 F | DIASTOLIC BLOOD PRESSURE: 70 MMHG | HEIGHT: 60 IN

## 2020-10-14 LAB
ALBUMIN SERPL ELPH-MCNC: 4.3 G/DL
ALP BLD-CCNC: 99 U/L
ALT SERPL-CCNC: 25 U/L
ANION GAP SERPL CALC-SCNC: 13 MMOL/L
APPEARANCE: CLEAR
AST SERPL-CCNC: 23 U/L
B2 GLYCOPROT1 IGG SER-ACNC: <5 SGU
B2 GLYCOPROT1 IGM SER-ACNC: >150 SMU
BASOPHILS # BLD AUTO: 0.05 K/UL
BASOPHILS NFR BLD AUTO: 0.5 %
BILIRUB SERPL-MCNC: 0.3 MG/DL
BILIRUBIN URINE: NEGATIVE
BLOOD URINE: NEGATIVE
BUN SERPL-MCNC: 10 MG/DL
CALCIUM SERPL-MCNC: 9.5 MG/DL
CHLORIDE SERPL-SCNC: 101 MMOL/L
CO2 SERPL-SCNC: 26 MMOL/L
COLOR: YELLOW
CREAT SERPL-MCNC: 0.68 MG/DL
CRP SERPL-MCNC: 0.22 MG/DL
EOSINOPHIL # BLD AUTO: 0.3 K/UL
EOSINOPHIL NFR BLD AUTO: 3 %
ERYTHROCYTE [SEDIMENTATION RATE] IN BLOOD BY WESTERGREN METHOD: 22 MM/HR
GLUCOSE QUALITATIVE U: ABNORMAL
GLUCOSE SERPL-MCNC: 292 MG/DL
HCT VFR BLD CALC: 40.8 %
HGB BLD-MCNC: 12.9 G/DL
IMM GRANULOCYTES NFR BLD AUTO: 0.3 %
KETONES URINE: NEGATIVE
LEUKOCYTE ESTERASE URINE: ABNORMAL
LYMPHOCYTES # BLD AUTO: 3.77 K/UL
LYMPHOCYTES NFR BLD AUTO: 37.5 %
MAN DIFF?: NORMAL
MCHC RBC-ENTMCNC: 29.8 PG
MCHC RBC-ENTMCNC: 31.6 GM/DL
MCV RBC AUTO: 94.2 FL
MONOCYTES # BLD AUTO: 0.62 K/UL
MONOCYTES NFR BLD AUTO: 6.2 %
NEUTROPHILS # BLD AUTO: 5.27 K/UL
NEUTROPHILS NFR BLD AUTO: 52.5 %
NITRITE URINE: NEGATIVE
PH URINE: 7.5
PLATELET # BLD AUTO: 287 K/UL
POTASSIUM SERPL-SCNC: 4.8 MMOL/L
PROT SERPL-MCNC: 6.9 G/DL
PROTEIN URINE: NORMAL
RBC # BLD: 4.33 M/UL
RBC # FLD: 13.3 %
SODIUM SERPL-SCNC: 140 MMOL/L
SPECIFIC GRAVITY URINE: 1.01
UROBILINOGEN URINE: NORMAL
WBC # FLD AUTO: 10.04 K/UL

## 2020-10-14 PROCEDURE — 99214 OFFICE O/P EST MOD 30 MIN: CPT

## 2020-10-15 LAB
CARDIOLIPIN IGM SER-MCNC: 10.2 MPL
CARDIOLIPIN IGM SER-MCNC: <5 GPL

## 2020-10-20 RX ORDER — CIPROFLOXACIN HYDROCHLORIDE 500 MG/1
500 TABLET, FILM COATED ORAL
Qty: 14 | Refills: 0 | Status: DISCONTINUED | COMMUNITY
Start: 2020-10-14 | End: 2020-10-20

## 2020-10-20 NOTE — HISTORY OF PRESENT ILLNESS
[FreeTextEntry1] : 60-year-old female here for follow up w/ Serbian language line  used today to translate. \par She is here for f/u of her seronegative erosive arthritis and Osteoporosis.\par Patient lost to follow up since 6/17/2020 and encouraged compliance please. \par \par States she has been taking Sulfasalazine 500mg 2 tabs Am and 1 tab PM w/ labs on it recently to review. \par States she is noting some achy pain in her b/l hands/MCPs that is improved but not resolved on it yet.\par States she is noting some dysuria and foul odor to urine lately and has hx of recurrent UTIs. \par Has full ROM in b/l shoulders, no pelvic/girdle stiffness and able to stand up without using her hands, no temporal pain/unremitting headaches, no vision changes, no jaw pain.\par States she notices some tingling in her arms and feet with history of diabetes on insulin and is on gabapentin BID and will bring dose next visit. \par Denies any fever/chills, no rashes, no ulcers, + dry eyes w/ her Optho, Dr. Kane Kaplan; + dry mouth at times, no raynaud's, no GI/ symptoms at this time.\par \par Reports hx of 1 DVT around 2014 w/ PE and no other blood clots; no stroke; 5 healthy pregnancies; no miscarriages. States taking ASA 81mg daily and needs referral for Heme/Onc again for high + beta glycoprotein.\par \par She is postmenopausal, no prior fractures and has FH of osteoporosis in her sister. States she does have GERD so defers PO bisphosphonates and doing well on Prolia. \par \par

## 2020-10-20 NOTE — PHYSICAL EXAM
[General Appearance - Alert] : alert [General Appearance - Well Nourished] : well nourished [Sclera] : the sclera and conjunctiva were normal [Extraocular Movements] : extraocular movements were intact [Outer Ear] : the ears and nose were normal in appearance [Nasal Cavity] : the nasal mucosa and septum were normal [Neck Appearance] : the appearance of the neck was normal [Respiration, Rhythm And Depth] : normal respiratory rhythm and effort [Auscultation Breath Sounds / Voice Sounds] : lungs were clear to auscultation bilaterally [Heart Rate And Rhythm] : heart rate was normal and rhythm regular [Heart Sounds] : normal S1 and S2 [Bowel Sounds] : normal bowel sounds [Abdomen Soft] : soft [Abdomen Tenderness] : non-tender [Cervical Lymph Nodes Enlarged Anterior Bilaterally] : anterior cervical [Supraclavicular Lymph Nodes Enlarged Bilaterally] : supraclavicular [No CVA Tenderness] : no ~M costovertebral angle tenderness [No Spinal Tenderness] : no spinal tenderness [Motor Tone] : muscle strength and tone were normal [] : no rash [Cranial Nerves] : cranial nerves 2-12 were intact [No Focal Deficits] : no focal deficits [Impaired Insight] : insight and judgment were intact [Mood] : the mood was normal [FreeTextEntry1] : tenderness in Rt hand 2-3MCP; tenderness in the Lt hand 2-3MCP w/ some swelling; no effusion

## 2020-10-20 NOTE — REASON FOR VISIT
[Follow-Up: _____] : a [unfilled] follow-up visit [FreeTextEntry1] : Seronegative erosion arthritis; recurrent UTIs; review labs/meds

## 2020-10-20 NOTE — REVIEW OF SYSTEMS
[As Noted in HPI] : as noted in HPI [Fever] : no fever [Chills] : no chills [Eye Pain] : no eye pain [Nosebleeds] : no nosebleeds [Red Eyes] : eyes not red [Shortness Of Breath] : no shortness of breath [Chest Pain] : no chest pain [Abdominal Pain] : no abdominal pain [Vomiting] : no vomiting [Confused] : no confusion [Proptosis] : no proptosis [Suicidal] : not suicidal [Easy Bleeding] : no tendency for easy bleeding [Muscle Weakness] : no muscle weakness

## 2020-10-20 NOTE — ASSESSMENT
[FreeTextEntry1] : 60-year-old female, here for follow up w/ joint aches throughout the hands/MCPs/ PIPs/ bilaterally w/ synovitis w/ xray of b/l hands reading small well marginated erosion along proximal ulnar articular margin of 3rd DIP joint w/ Seronegative arthritis w/ raised ESR w/ concern for reactive arthritis given hx of recurrent UTIs; versus Seronegative RA in the differential w/ hx of dry eyes on drops w/ her Optho.\par \par Seronegative Arthritis: \par -lost to f/u since 6/17/2020 due to covid19 she reports she has been taking sulfasalazine 500mg 2 tabs AM and 1 tab PM\par -reviewed labs 10/13/2020 w/ UA w/ large LE w/ glu; raised ESR/CRP w/ CBC ok; CMP w/ high glu (monitoring DM w/ PCP); beta2 glycoprotein IgM >150\par -had hx of recurrent UTIs to start tx from here and request surologist referral \par -discussed r/b/s of increasing Sulfasalazine 500mg to 3 tabs AM and 2 tab PM until f/u w/ G6PD NL w/ pt agreeable and prescription sent as below\par -labs as below to monitor for f/u on it \par -xray b/l hands 2/3/2020 reading small well marginated erosion along proximal ulnar articular margin of 3rd DIP joint\par -dry eyes: on drops w/ her Optho she reports\par -dry mouth: biotene mouthwash or toothpaste PRN \par \par UTI: reports of dysuria w/ foul smell; no CVA tenderness today\par -reviewed UA 10/13/2020 w/ large LE w/ 48 WBC \par -discussed r/b/s of Ciprofloxacin BID w/ pt agreeable and prescription sent as below\par -referred to Urologist for recurrent UTIs as requested by her \par -discussed to wipe front to back and use cranberry juice \par \par 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 and + [id number] IgM >150 high titer x2; LAC negative 6/15/2020\par -on ASA 81mg daily\par -referred to Heme to discuss if needs AC and need to continue ASA; forgot to go from last visit and referral given again today \par -discussed APLS panel to be repeat for f/u as below\par -GUILLERMINA w/ IF neg w/o clinical symptoms of SLE at this time & monitoring w/ labs as below to be complete\par \par LBP: intermittent \par -xray LS spine 2/3/2020 w/ DDD; SI normal \par -Motrin PRN w/ food needed sparingly helps\par \par Knee OA: xray b/l knees 2/3/2020 read as OA chagnes Rt knee\par -discussed she can consider steroid injection as needed \par \par Intermittent numbness/tingling: intermittent in hands and feet likely 2/2 to DM on insulin.\par -metabolic labs folate, B12, TSH normal for it 1/2020\par -states she takes gabapentin BID \par -knows if persistent can consider EMG w/ Neuro\par \par Osteoporosis: on Dexa 2/3/2020 w/ worse t-score -3.1 at spine\par -postmenopausal, no fractures; FH of it in sister\par -defers Fosamax bc states she has GERD and worries about compliance w/ bisphosphate \par -reviewed labs 6/15/2020 w/ Nl Ca=10; Nl parathyroid hormone \par -tolerated Prolia 6/17/2020 & q 6mo\par -discussed to continue Ca+vitD supplementation \par -encouraged weight bearing exercises as tolerated.\par \par -educated on symptoms to monitor for in detail and alert us if any concerns.\par -knows to stay up to date on health maintenance w/ PCP; encourage compliance \par -f/u in 4-6 weeks w/ labs or sooner as needed \par \par Addendum: Sandy was called with  and informed her Urine culture is + for E. coli >100,000 showing it is resistant to Cipro and bactrim so to stop the Cipro. It shows it is susceptible to Nitrofurantoin that I am prescribing for 5 days to take w/ food and to alert us if any concerns and agrees.

## 2020-10-21 ENCOUNTER — APPOINTMENT (OUTPATIENT)
Dept: UROLOGY | Facility: CLINIC | Age: 60
End: 2020-10-21
Payer: MEDICAID

## 2020-10-21 VITALS
HEART RATE: 84 BPM | BODY MASS INDEX: 28.47 KG/M2 | WEIGHT: 145 LBS | OXYGEN SATURATION: 97 % | HEIGHT: 60 IN | DIASTOLIC BLOOD PRESSURE: 58 MMHG | TEMPERATURE: 97.7 F | SYSTOLIC BLOOD PRESSURE: 136 MMHG

## 2020-10-21 DIAGNOSIS — N28.1 CYST OF KIDNEY, ACQUIRED: ICD-10-CM

## 2020-10-21 DIAGNOSIS — R33.9 RETENTION OF URINE, UNSPECIFIED: ICD-10-CM

## 2020-10-21 LAB
BILIRUB UR QL STRIP: NEGATIVE
CLARITY UR: CLEAR
COLLECTION METHOD: NORMAL
GLUCOSE UR-MCNC: NORMAL
HCG UR QL: 0.2 EU/DL
HGB UR QL STRIP.AUTO: NEGATIVE
KETONES UR-MCNC: NEGATIVE
LEUKOCYTE ESTERASE UR QL STRIP: NORMAL
NITRITE UR QL STRIP: NEGATIVE
PH UR STRIP: 6.5
PROT UR STRIP-MCNC: NEGATIVE
SP GR UR STRIP: 1.01

## 2020-10-21 PROCEDURE — 99215 OFFICE O/P EST HI 40 MIN: CPT | Mod: 25

## 2020-10-21 PROCEDURE — 51798 US URINE CAPACITY MEASURE: CPT

## 2020-10-21 PROCEDURE — 81003 URINALYSIS AUTO W/O SCOPE: CPT | Mod: QW

## 2020-10-21 NOTE — HISTORY OF PRESENT ILLNESS
[FreeTextEntry1] : 59 yo female presents for recurrent urinary tract infections. \par Has had 10 urinary tract infections in last 6 months. Has been having recurrent urinary tract infections for last 6 years. \par UTIs are characterized by urinary frequency and dysuria. \par Currently on Nitrofurantoin. \par Daytime frequency is 5-6 x or so. Nocturia of 2 x. \par Endorses hesitancy, straining, intermittency and sense of incomplete emptying. \par Denies hematuria, lower abdominal or flank pain, fever, chills or rigors.\par Has history of kidney stone. Had ureteral stent placed and underwent alkalinization and subsequent stent removal. Saw Dr Merritt. \par \par Follows with Gynecology. Reported normal Pelvic exam. \par

## 2020-10-21 NOTE — LETTER BODY
[Dear  ___] : Dear  [unfilled], [Consult Letter:] : I had the pleasure of evaluating your patient, [unfilled]. [( Thank you for referring [unfilled] for consultation for _____ )] : Thank you for referring [unfilled] for consultation for [unfilled] [Please see my note below.] : Please see my note below. [Consult Closing:] : Thank you very much for allowing me to participate in the care of this patient.  If you have any questions, please do not hesitate to contact me. [Sincerely,] : Sincerely, [FreeTextEntry3] : Louie Rivas MD\par  of Urology\par [facility]\par \par Offices:\par [facility] @\par [address], [place] [address]\par and\par [address], [place] [address], [address]\par and\par [address] [address]\par \par TEL: [phone]\par FAX: [phone]

## 2020-10-21 NOTE — HISTORY OF PRESENT ILLNESS
[FreeTextEntry1] : 61 yo female presents for recurrent urinary tract infections. \par Has had 10 urinary tract infections in last 6 months. Has been having recurrent urinary tract infections for last 6 years. \par UTIs are characterized by urinary frequency and dysuria. \par Currently on Nitrofurantoin. \par Daytime frequency is 5-6 x or so. Nocturia of 2 x. \par Endorses hesitancy, straining, intermittency and sense of incomplete emptying. \par Denies hematuria, lower abdominal or flank pain, fever, chills or rigors.\par Has history of kidney stone. Had ureteral stent placed and underwent alkalinization and subsequent stent removal. Saw Dr Merritt. \par \par Follows with Gynecology. Reported normal Pelvic exam. \par

## 2020-10-21 NOTE — REASON FOR VISIT
[Initial Visit ___] : [unfilled] is here today for an initial visit  for [unfilled] [Spouse] : spouse [[employer] ] : provided by [employer]   [FreeTextEntry1] : [id number] [FreeTextEntry2] : Satya [TWNoteComboBox1] : Bermudian

## 2020-10-21 NOTE — REASON FOR VISIT
[Initial Visit ___] : [unfilled] is here today for an initial visit  for [unfilled] [Spouse] : spouse [[employer] ] : provided by [employer]   [FreeTextEntry1] : [id number] [FreeTextEntry2] : Satya [TWNoteComboBox1] : Greenlandic

## 2020-10-21 NOTE — LETTER BODY
[Dear  ___] : Dear  [unfilled], [Consult Letter:] : I had the pleasure of evaluating your patient, [unfilled]. [( Thank you for referring [unfilled] for consultation for _____ )] : Thank you for referring [unfilled] for consultation for [unfilled] [Please see my note below.] : Please see my note below. [Consult Closing:] : Thank you very much for allowing me to participate in the care of this patient.  If you have any questions, please do not hesitate to contact me. [Sincerely,] : Sincerely, [FreeTextEntry3] : Louie Rivas MD\par  of Urology\par [facility]\par \par Offices:\par [facility] @\par [address], [place] [address]\par and\par [address], [place] [address], [address]\par and\par [address] [address]\par \par TEL: [phone]\par FAX: [phone]

## 2020-10-27 ENCOUNTER — LABORATORY RESULT (OUTPATIENT)
Age: 60
End: 2020-10-27

## 2020-10-29 ENCOUNTER — LABORATORY RESULT (OUTPATIENT)
Age: 60
End: 2020-10-29

## 2020-10-29 NOTE — ED PROVIDER NOTE - GASTROINTESTINAL, MLM
Previous Accession (Optional): [id number] Previous Accession (Optional): [id number] Abdomen soft, non-tender, no guarding.

## 2020-10-30 LAB
APPEARANCE: CLEAR
BILIRUBIN URINE: NEGATIVE
BLOOD URINE: NEGATIVE
COLOR: NORMAL
GLUCOSE QUALITATIVE U: NEGATIVE
KETONES URINE: NEGATIVE
LEUKOCYTE ESTERASE URINE: ABNORMAL
NITRITE URINE: POSITIVE
PH URINE: 7
PROTEIN URINE: NEGATIVE
SPECIFIC GRAVITY URINE: 1.01
UROBILINOGEN URINE: NORMAL

## 2020-11-02 ENCOUNTER — NON-APPOINTMENT (OUTPATIENT)
Age: 60
End: 2020-11-02

## 2020-11-02 DIAGNOSIS — N39.0 URINARY TRACT INFECTION, SITE NOT SPECIFIED: ICD-10-CM

## 2020-11-02 RX ORDER — NITROFURANTOIN (MONOHYDRATE/MACROCRYSTALS) 25; 75 MG/1; MG/1
100 CAPSULE ORAL TWICE DAILY
Qty: 14 | Refills: 0 | Status: ACTIVE | COMMUNITY
Start: 2020-11-02 | End: 1900-01-01

## 2020-11-13 ENCOUNTER — OUTPATIENT (OUTPATIENT)
Dept: OUTPATIENT SERVICES | Facility: HOSPITAL | Age: 60
LOS: 1 days | End: 2020-11-13
Payer: MEDICAID

## 2020-11-13 DIAGNOSIS — Z98.890 OTHER SPECIFIED POSTPROCEDURAL STATES: Chronic | ICD-10-CM

## 2020-11-13 DIAGNOSIS — E11.3592 TYPE 2 DIABETES MELLITUS WITH PROLIFERATIVE DIABETIC RETINOPATHY WITHOUT MACULAR EDEMA, LEFT EYE: Chronic | ICD-10-CM

## 2020-11-13 DIAGNOSIS — M25.9 JOINT DISORDER, UNSPECIFIED: Chronic | ICD-10-CM

## 2020-11-13 DIAGNOSIS — Z98.49 CATARACT EXTRACTION STATUS, UNSPECIFIED EYE: Chronic | ICD-10-CM

## 2020-11-13 DIAGNOSIS — Z95.828 PRESENCE OF OTHER VASCULAR IMPLANTS AND GRAFTS: Chronic | ICD-10-CM

## 2020-11-13 DIAGNOSIS — Z98.51 TUBAL LIGATION STATUS: Chronic | ICD-10-CM

## 2020-11-13 DIAGNOSIS — Z11.59 ENCOUNTER FOR SCREENING FOR OTHER VIRAL DISEASES: ICD-10-CM

## 2020-11-13 LAB — SARS-COV-2 RNA SPEC QL NAA+PROBE: SIGNIFICANT CHANGE UP

## 2020-11-13 PROCEDURE — U0003: CPT

## 2020-11-14 DIAGNOSIS — Z11.59 ENCOUNTER FOR SCREENING FOR OTHER VIRAL DISEASES: ICD-10-CM

## 2020-12-17 ENCOUNTER — RX RENEWAL (OUTPATIENT)
Age: 60
End: 2020-12-17

## 2021-01-06 ENCOUNTER — LABORATORY RESULT (OUTPATIENT)
Age: 61
End: 2021-01-06

## 2021-01-06 ENCOUNTER — APPOINTMENT (OUTPATIENT)
Dept: RHEUMATOLOGY | Facility: CLINIC | Age: 61
End: 2021-01-06
Payer: MEDICAID

## 2021-01-06 VITALS
HEART RATE: 88 BPM | OXYGEN SATURATION: 98 % | DIASTOLIC BLOOD PRESSURE: 70 MMHG | BODY MASS INDEX: 30.66 KG/M2 | WEIGHT: 157 LBS | SYSTOLIC BLOOD PRESSURE: 122 MMHG | TEMPERATURE: 97.9 F

## 2021-01-06 DIAGNOSIS — M70.62 TROCHANTERIC BURSITIS, LEFT HIP: ICD-10-CM

## 2021-01-06 DIAGNOSIS — R35.0 FREQUENCY OF MICTURITION: ICD-10-CM

## 2021-01-06 DIAGNOSIS — M25.511 PAIN IN RIGHT SHOULDER: ICD-10-CM

## 2021-01-06 DIAGNOSIS — R70.0 ELEVATED ERYTHROCYTE SEDIMENTATION RATE: ICD-10-CM

## 2021-01-06 PROCEDURE — 20610 DRAIN/INJ JOINT/BURSA W/O US: CPT | Mod: RT

## 2021-01-06 PROCEDURE — 99072 ADDL SUPL MATRL&STAF TM PHE: CPT

## 2021-01-06 PROCEDURE — 99214 OFFICE O/P EST MOD 30 MIN: CPT | Mod: 25

## 2021-01-06 PROCEDURE — 96401 CHEMO ANTI-NEOPL SQ/IM: CPT

## 2021-01-06 RX ORDER — METHYLPRED ACET/NACL,ISO-OS/PF 40 MG/ML
40 VIAL (ML) INJECTION
Qty: 1 | Refills: 0 | Status: COMPLETED | OUTPATIENT
Start: 2021-01-06

## 2021-01-06 RX ORDER — DENOSUMAB 60 MG/ML
60 INJECTION SUBCUTANEOUS
Qty: 1 | Refills: 0 | Status: COMPLETED | OUTPATIENT
Start: 2021-01-06

## 2021-01-06 RX ADMIN — DENOSUMAB 0 MG/ML: 60 INJECTION SUBCUTANEOUS at 00:00

## 2021-01-06 RX ADMIN — METHYLPREDNISOLONE ACETATE 40 MG/ML: 40 INJECTION, SUSPENSION INTRA-ARTICULAR; INTRALESIONAL; INTRAMUSCULAR; SOFT TISSUE at 00:00

## 2021-01-06 RX ADMIN — METHYLPREDNISOLONE ACETATE MG/ML: 40 INJECTION, SUSPENSION INTRA-ARTICULAR; INTRALESIONAL; INTRAMUSCULAR; SOFT TISSUE at 00:00

## 2021-01-06 NOTE — HISTORY OF PRESENT ILLNESS
[FreeTextEntry1] : 60-year-old female here for follow up w/ Arabic language line  used today to translate. \par She is here for f/u of her seronegative erosive arthritis and Osteoporosis.\par \par States she has been taking Sulfasalazine 500mg 2 tabs Am and 1 tab PM and forgot to increase it as advised last visit. \par States she is noting some achy pain in her b/l hands/MCPs that is improved but not resolved on it yet.\par States she is noting some increased urinary frequency and wants her urine checked again and has hx of recurrent UTIs treated here also. \par Has full ROM in b/l shoulders, no pelvic/girdle stiffness and able to stand up without using her hands, no temporal pain/unremitting headaches, no vision changes, no jaw pain.\par States she notices some tingling in her arms and feet with history of diabetes on insulin and is on gabapentin BID.\par Denies any fever/chills, no rashes, no ulcers, + dry eyes w/ her Optho, Dr. Kane Kaplan; + dry mouth at times, no raynaud's, no GI/ symptoms at this time.\par \par Today she states she has some Rt shoulder achy pain rated 5/10 for severity w/ decent ROM. States she has hx of lipoma of Rt shoulder for many yrs. Denies any injury or trauma.\par States she is some Lt sided hip pain rated 6/10 for severity w/ trouble sleeping on her side. Denies any injury or trauma.  \par \par Reports hx of 1 DVT around 2014 w/ PE and no other blood clots; no stroke; 5 healthy pregnancies; no miscarriages. States taking ASA 81mg daily and needs referral for Heme/Onc again for high + beta glycoprotein.\par \par She is postmenopausal, no prior fractures and has FH of osteoporosis in her sister. States she does have GERD so defers PO bisphosphonates and doing well on Prolia and due for it today. Denies any recent extractions or implants and knows to inform her dentist she is on [place]. \par \par \par

## 2021-01-06 NOTE — PHYSICAL EXAM
[General Appearance - Alert] : alert [General Appearance - Well Nourished] : well nourished [Sclera] : the sclera and conjunctiva were normal [Extraocular Movements] : extraocular movements were intact [Outer Ear] : the ears and nose were normal in appearance [Nasal Cavity] : the nasal mucosa and septum were normal [Neck Appearance] : the appearance of the neck was normal [Respiration, Rhythm And Depth] : normal respiratory rhythm and effort [Auscultation Breath Sounds / Voice Sounds] : lungs were clear to auscultation bilaterally [Heart Rate And Rhythm] : heart rate was normal and rhythm regular [Heart Sounds] : normal S1 and S2 [Bowel Sounds] : normal bowel sounds [Abdomen Soft] : soft [Abdomen Tenderness] : non-tender [Cervical Lymph Nodes Enlarged Anterior Bilaterally] : anterior cervical [Supraclavicular Lymph Nodes Enlarged Bilaterally] : supraclavicular [No CVA Tenderness] : no ~M costovertebral angle tenderness [No Spinal Tenderness] : no spinal tenderness [Motor Tone] : muscle strength and tone were normal [] : no rash [Cranial Nerves] : cranial nerves 2-12 were intact [No Focal Deficits] : no focal deficits [Impaired Insight] : insight and judgment were intact [Mood] : the mood was normal [FreeTextEntry1] : Rt hand 2nd  MCP tenderness/synovitis; Lt hand 2nd and 5th MCP tenderness; Rt shoulder tenderness w/ rotation w/ good ROM; no left shoulder tenderness w/ full ROM; Lt trochanteric bursa tenderness; able to stand up w/o using her hands

## 2021-01-06 NOTE — ASSESSMENT
[FreeTextEntry1] : 60-year-old female, here for follow up w/ joint aches throughout the hands/MCPs/ PIPs/ bilaterally w/ synovitis w/ xray of b/l hands reading small well marginated erosion along proximal ulnar articular margin of 3rd DIP joint w/ Seronegative arthritis w/ raised ESR w/ concern for reactive arthritis given hx of recurrent UTIs; versus Seronegative RA in the differential w/ hx of dry eyes on drops w/ her Optho.\par \par Seronegative Arthritis: \par -reports she has been taking sulfasalazine 500mg 2 tabs AM and 1 tab PM & forgot to increase from last visit and notes pain in b/l MCPs still \par -reviewed labs 10/13/2020 w/ UA w/ large LE w/ glu (UTI treated); raised ESR/CRP w/ CBC ok; CMP w/ high glu (monitoring DM w/ PCP); beta2 glycoprotein IgM >150\par -had hx of recurrent UTIs \par -discussed r/b/s of increasing Sulfasalazine 500mg to 3 tabs AM and 2 tab PM until f/u w/ G6PD NL w/ pt agreeable and prescription sent as below\par -labs as below to monitor \par -xray b/l hands 2/3/2020 reading small well marginated erosion along proximal ulnar articular margin of 3rd DIP joint\par -dry eyes: on drops w/ her Optho she reports\par -dry mouth: biotene mouthwash or toothpaste PRN \par \par Urinary frequency: put in for UA w/ urine culture as requested & call for results \par \par 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 and + [id number] IgM >150 high titer x2; LAC negative 6/15/2020\par -on ASA 81mg daily\par -referred to Heme to discuss if needs AC and need to continue ASA; forgot to go from last visit and referral given again today \par -discussed APLS panel to be repeat for f/u as below\par -GUILLERMINA w/ IF neg w/o clinical symptoms of SLE at this time & monitoring w/ labs as below to be complete\par \par LBP: intermittent \par -xray LS spine 2/3/2020 w/ DDD; SI normal \par -Motrin PRN w/ food needed sparingly helps\par \par Rt shoulder pain/ bursitis: offered Depomedrol 40mg injection today and patient agreeable.\par -Has full ROM in b/l shoulders, no pelvic/girdle stiffness and able to stand up without using her hands, no temporal pain/unremitting headaches, no vision changes, no jaw pain.\par \par Lt trochanteric bursitis: offered Depomedrol 40mg injection today and patient agreeable.\par \par Knee OA: xray b/l knees 2/3/2020 read as OA chagnes Rt knee\par -discussed she can consider steroid injection as needed \par \par Intermittent numbness/tingling: intermittent in hands and feet likely 2/2 to DM on insulin.\par -metabolic labs folate, B12, TSH normal for it 1/2020\par -states she takes gabapentin BID \par -knows if persistent can consider EMG w/ Neuro\par \par Osteoporosis: on Dexa 2/3/2020 w/ worse t-score -3.1 at spine\par -postmenopausal, no fractures; FH of it in sister\par -defers Fosamax bc states she has GERD and worries about compliance w/ bisphosphate \par -reviewed labs 10/13/2020 w/ Nl Ca=9.5\par -tolerated Prolia 6/17/2020 well\par -discussed r/b/s of Prolia 60mg/ml q 6 mo w/ pt agreeable\par -given to Lt upper arm Prolia 60mg/ml lot # [id number] exp 04/23 & tolerated well today \par -discussed and knows to avoid extraction and implants on it w/ dentist \par -discussed to continue Ca+vitD supplementation \par -encouraged weight bearing exercises as tolerated.\par \par -educated on symptoms to monitor for in detail and alert us if any concerns.\par -knows to stay up to date on health maintenance w/ PCP; encourage compliance \par -f/u in 6-8 weeks w/ labs or sooner as needed \par \par

## 2021-01-06 NOTE — REASON FOR VISIT
[Follow-Up: _____] : a [unfilled] follow-up visit [FreeTextEntry1] : Seronegative erosion arthritis; labs/meds; Rt shoulder pain; Lt sided hip pain; Osteoporosis \par

## 2021-01-06 NOTE — PROCEDURE
[Today's Date:] : Date: [unfilled] [Therapeutic] : therapeutic [#1 Site: ______] : #1 site identified in the [unfilled] [#2 Site: ___] : # 2 site identified in the [unfilled] [#3 Site: ______] : # 3 site identified in the [unfilled] [Risks] : risks [Benefits] : benefits [Alternatives] : alternatives [Consent Obtained] : written consent was obtained prior to the procedure and is detailed in the patient's record [Patient] : Prior to the start of the procedure a time out was taken and the identity of the patient was confirmed via name and date of birth with the patient. The correct site and the procedure to be performed were confirmed. The correct side was confirmed if applicable. The availability of the correct equipment was verified [Ethyl Chloride] : ethyl chloride [Betadine] : betadine solution [Alcohol] : alcohol [22 gauge 1.5 inch] : A 22 gauge 1.5 inch needle was used [___ml 1% Lidocaine] : [unfilled] ml of 1% lidocaine [Tolerated Well] : the patient tolerated the procedure well [No Complications] : there were no complications [FreeTextEntry7] : x3 [FreeTextEntry5] : x3 [de-identified] : Lt upper arm Prolia 60mg/ml lot # [id number] exp 04/23; Rt shoulder depomedrol 40mg; Lt trochanteric bursa depomedrol 40mg

## 2021-01-06 NOTE — REVIEW OF SYSTEMS
[As Noted in HPI] : as noted in HPI [Fever] : no fever [Chills] : no chills [Eye Pain] : no eye pain [Red Eyes] : eyes not red [Earache] : no earache [Nosebleeds] : no nosebleeds [Chest Pain] : no chest pain [Shortness Of Breath] : no shortness of breath [Abdominal Pain] : no abdominal pain [Vomiting] : no vomiting [Confused] : no confusion [Suicidal] : not suicidal [Proptosis] : no proptosis [Muscle Weakness] : no muscle weakness [Easy Bleeding] : no tendency for easy bleeding

## 2021-01-07 ENCOUNTER — OUTPATIENT (OUTPATIENT)
Dept: OUTPATIENT SERVICES | Facility: HOSPITAL | Age: 61
LOS: 1 days | End: 2021-01-07
Payer: MEDICAID

## 2021-01-07 DIAGNOSIS — Z95.828 PRESENCE OF OTHER VASCULAR IMPLANTS AND GRAFTS: Chronic | ICD-10-CM

## 2021-01-07 DIAGNOSIS — Z98.49 CATARACT EXTRACTION STATUS, UNSPECIFIED EYE: Chronic | ICD-10-CM

## 2021-01-07 DIAGNOSIS — Z98.51 TUBAL LIGATION STATUS: Chronic | ICD-10-CM

## 2021-01-07 DIAGNOSIS — Z20.828 CONTACT WITH AND (SUSPECTED) EXPOSURE TO OTHER VIRAL COMMUNICABLE DISEASES: ICD-10-CM

## 2021-01-07 DIAGNOSIS — Z98.890 OTHER SPECIFIED POSTPROCEDURAL STATES: Chronic | ICD-10-CM

## 2021-01-07 DIAGNOSIS — E11.3592 TYPE 2 DIABETES MELLITUS WITH PROLIFERATIVE DIABETIC RETINOPATHY WITHOUT MACULAR EDEMA, LEFT EYE: Chronic | ICD-10-CM

## 2021-01-07 DIAGNOSIS — M25.9 JOINT DISORDER, UNSPECIFIED: Chronic | ICD-10-CM

## 2021-01-07 LAB — SARS-COV-2 RNA SPEC QL NAA+PROBE: SIGNIFICANT CHANGE UP

## 2021-01-07 PROCEDURE — U0003: CPT

## 2021-01-07 PROCEDURE — U0005: CPT

## 2021-01-07 PROCEDURE — C9803: CPT

## 2021-01-08 DIAGNOSIS — Z20.828 CONTACT WITH AND (SUSPECTED) EXPOSURE TO OTHER VIRAL COMMUNICABLE DISEASES: ICD-10-CM

## 2021-01-13 ENCOUNTER — NON-APPOINTMENT (OUTPATIENT)
Age: 61
End: 2021-01-13

## 2021-01-15 LAB
ALBUMIN SERPL ELPH-MCNC: 4.6 G/DL
ALP BLD-CCNC: 110 U/L
ALT SERPL-CCNC: 24 U/L
ANA SER IF-ACNC: NEGATIVE
ANION GAP SERPL CALC-SCNC: 12 MMOL/L
APPEARANCE: CLEAR
AST SERPL-CCNC: 27 U/L
BASOPHILS # BLD AUTO: 0.05 K/UL
BASOPHILS NFR BLD AUTO: 0.5 %
BILIRUB SERPL-MCNC: 0.2 MG/DL
BILIRUBIN URINE: NEGATIVE
BLOOD URINE: NEGATIVE
BUN SERPL-MCNC: 11 MG/DL
CALCIUM SERPL-MCNC: 10 MG/DL
CENTROMERE IGG SER-ACNC: <0.2 CD:130001892
CHLORIDE SERPL-SCNC: 101 MMOL/L
CO2 SERPL-SCNC: 28 MMOL/L
COLOR: NORMAL
CREAT SERPL-MCNC: 0.8 MG/DL
CRP SERPL-MCNC: 0.19 MG/DL
ENA SCL70 IGG SER IA-ACNC: <0.2 AL
EOSINOPHIL # BLD AUTO: 0.67 K/UL
EOSINOPHIL NFR BLD AUTO: 6.5 %
ERYTHROCYTE [SEDIMENTATION RATE] IN BLOOD BY WESTERGREN METHOD: 21 MM/HR
GLUCOSE QUALITATIVE U: ABNORMAL
GLUCOSE SERPL-MCNC: 189 MG/DL
HCT VFR BLD CALC: 42 %
HGB BLD-MCNC: 13.2 G/DL
IMM GRANULOCYTES NFR BLD AUTO: 0.3 %
KETONES URINE: NEGATIVE
LEUKOCYTE ESTERASE URINE: NEGATIVE
LYMPHOCYTES # BLD AUTO: 4.3 K/UL
LYMPHOCYTES NFR BLD AUTO: 41.9 %
MAN DIFF?: NORMAL
MCHC RBC-ENTMCNC: 30.5 PG
MCHC RBC-ENTMCNC: 31.4 GM/DL
MCV RBC AUTO: 97 FL
MONOCYTES # BLD AUTO: 0.75 K/UL
MONOCYTES NFR BLD AUTO: 7.3 %
NEUTROPHILS # BLD AUTO: 4.46 K/UL
NEUTROPHILS NFR BLD AUTO: 43.5 %
NITRITE URINE: NEGATIVE
PH URINE: 6.5
PLATELET # BLD AUTO: 309 K/UL
POTASSIUM SERPL-SCNC: 4.6 MMOL/L
PROT SERPL-MCNC: 7.4 G/DL
PROTEIN URINE: NEGATIVE
RBC # BLD: 4.33 M/UL
RBC # FLD: 13.6 %
SODIUM SERPL-SCNC: 141 MMOL/L
SPECIFIC GRAVITY URINE: 1
UROBILINOGEN URINE: NORMAL
WBC # FLD AUTO: 10.26 K/UL

## 2021-02-03 ENCOUNTER — RX RENEWAL (OUTPATIENT)
Age: 61
End: 2021-02-03

## 2021-03-01 ENCOUNTER — RX RENEWAL (OUTPATIENT)
Age: 61
End: 2021-03-01

## 2021-03-04 ENCOUNTER — RX RENEWAL (OUTPATIENT)
Age: 61
End: 2021-03-04

## 2021-03-30 ENCOUNTER — APPOINTMENT (OUTPATIENT)
Dept: NEUROLOGY | Facility: CLINIC | Age: 61
End: 2021-03-30
Payer: MEDICAID

## 2021-03-30 VITALS
WEIGHT: 157 LBS | TEMPERATURE: 97.8 F | BODY MASS INDEX: 30.82 KG/M2 | SYSTOLIC BLOOD PRESSURE: 135 MMHG | HEART RATE: 99 BPM | DIASTOLIC BLOOD PRESSURE: 84 MMHG | HEIGHT: 60 IN

## 2021-03-30 DIAGNOSIS — R40.1 STUPOR: ICD-10-CM

## 2021-03-30 PROCEDURE — 99072 ADDL SUPL MATRL&STAF TM PHE: CPT

## 2021-03-30 PROCEDURE — 99204 OFFICE O/P NEW MOD 45 MIN: CPT

## 2021-03-30 NOTE — DATA REVIEWED
[de-identified] :  EXAM:  CT ANGIO BRAIN (W)AW IC                      \par \par EXAM:  CT ANGIO NECK (W)AW IC                      \par \par EXAM:  CT BRAIN STROKE PROTOCOL                      \par \par \par PROCEDURE DATE:  07/09/2020  \par \par \par \par INTERPRETATION:  CLINICAL HISTORY: Stroke code. Left arm weakness and numbness x1 hour with facial tingling. \par \par TECHNIQUE: \par Noncontrast head CT was followed by CT images acquired through the  neck and head  during the arterial phase. RAPID artificial intelligence was used for preliminary evaluation of intracranial hemorrhage.\par Intravenous contrast:  90 cc of Omnipaque-350 mg/ml were administered; 10 cc were discarded.\par Three-dimensional MIP reformats were generated.  \par \par COMPARISON STUDY: CT head of 8/26/2018.\par \par FINDINGS: \par \par NONCONTRAST CT HEAD:\par \par There is no acute intracranial hemorrhage, mass effect, midline shift, extra-axial collection, hydrocephalus, or evidence of acute vascular territorial infarction.\par \par Mild patchy hypodensities within the periventricular and subcortical white matter, although nonspecific, likely reflect chronic microvascular disease.\par \par The visualized paranasal sinuses and mastoid air cells are clear. Visualized osseous structures are intact. Right ocular globe glaucoma valve. Left globe status post silicone injection.\par \par CT ANGIOGRAPHY NECK:\par \par Thoracic aorta and branch vessels: Patent.  No atherosclerosis.  No flow-limiting stenosis.  No evidence of dissection.\par \par Right carotid system: Patent.  No atherosclerosis.  No hemodynamically significant stenosis using NASCET criteria.  No evidence of dissection.\par \par Left carotid system: Patent.  No atherosclerosis.  No hemodynamically significant stenosis using NASCET criteria.  No evidence of dissection.\par \par Vertebral arteries: Patent.  No atherosclerosis.  No flow-limiting stenosis.  No evidence of dissection.\par \par Soft tissues of the neck: Unremarkable.\par \par Visualized spine: Multilevel degenerative change.\par \par Visualized upper chest: Impression nonspecific ground less airspace opacities within the visualized lung apices..\par \par CT ANGIOGRAPHY BRAIN:\par \par Internal carotid arteries: Patent bilaterally.  No flow limiting stenosis.\par \par Anterior cerebral arteries: Patent bilaterally without flow limiting stenosis.\par \par Middle cerebral arteries: Patent bilaterally without flow limiting  stenosis.\par \par Anterior communicating artery: Not discretely identified.\par \par Posterior communicating arteries: Visualized on the left.\par \par Posterior cerebral arteries: Patent bilaterally without stenosis.\par \par Vertebrobasilar: Patent without stenosis. The distal vertebral arteries are similar in caliber.  \par \par Vascular lesions: No evidence of intracranial aneurysm within limits of CTA technique.  Tiny aneurysms may be beyond the resolution of this examination.\par \par Dural venous sinuses: Grossly patent.\par \par IMPRESSION: \par \par Noncontrast CT Head: No acute intracranal hemorrhage, mass effect, or evidence of acute vascular territorial infarct.\par \par If clinical symptoms persist or worsen, more sensitive evaluation with brain MRI may be obtained, if no contraindications exist.\par \par CTA Neck: No significant flow-limiting stenosis or evidence of acute dissection within the cervical carotid or vertebral arteries.\par \par Nonspecific groundglass airspace opacities within the visualized lung apices. Differential diagnostic considerations include edema and infection.\par \par CTA Head: No proximal large vessel occlusion.

## 2021-03-30 NOTE — DISCUSSION/SUMMARY
[FreeTextEntry1] : 61 omplaining ofrecurrent dizziness,feeling of pressure in the ears, ringing in the ears,questionable Ménière's disease, benign positional vertigo.\par history ofprogressive memory difficulties, especially short-term,examination limited, patient never learned to read or write.But does demonstrate some cognitive deficits. Possible underlying dementia.\par History of difficulty with balance,likely a combination of peripheral neuropathy, stable and dysfunction.\par Plan: We'll order an MRI of the brain, and internal ear canals. Rule out SOL.\par 24-hourEEG, rule out seizures.\par refer to ENT, , consider VENG.\par RTO, after above.

## 2021-03-30 NOTE — HISTORY OF PRESENT ILLNESS
[FreeTextEntry1] : 61 year old woman h xof DM, OA, c/o difficulty with memory.Unsteady, episodes of dizziness, at times poor sleep, fatigue, under stress at home.\par as noted for the last 2 years, noticeable difficulty with memory,short-term specialty,forgets conversations, appointments,where she places objects,her faror long-term memory is pretty good.No history of head injury or trauma,no history of stroke. Reports intermittent episodes of transient confusion, associated with a feeling of dizziness, off balance, and last several minutes, usually brought on by movement of the body.no association with loud noises,atmospheric pressure changes. In the past has had episodes where she fell to the ground without knowledge. No reported episode of tongue biting or bedwetting. No association with chest pain or palpitation.History of recurrent dizziness, very common, usually brought on by movement,will last about a minute, but recur throughout the day. 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 No trouble speaking,swallowing, no change of smell taste,no transient episodes of unilateral weakness or numbness.She has been diagnosed with diabetic peripheral neuropathy, takes gabapentin.facial headaches, described as sharp stabbing, tibial either side of the head.

## 2021-03-30 NOTE — REVIEW OF SYSTEMS
[Numbness] : numbness [Tingling] : tingling [Dizziness] : dizziness [Sleep Disturbances] : sleep disturbances [Anxiety] : anxiety [Constipation] : constipation [Arthralgias] : arthralgias [Joint Stiffness] : joint stiffness [Negative] : Heme/Lymph

## 2021-04-05 ENCOUNTER — RX RENEWAL (OUTPATIENT)
Age: 61
End: 2021-04-05

## 2021-04-08 ENCOUNTER — APPOINTMENT (OUTPATIENT)
Dept: NEUROLOGY | Facility: CLINIC | Age: 61
End: 2021-04-08
Payer: MEDICAID

## 2021-04-08 ENCOUNTER — APPOINTMENT (OUTPATIENT)
Dept: NEUROLOGY | Facility: CLINIC | Age: 61
End: 2021-04-08

## 2021-04-08 PROCEDURE — 99072 ADDL SUPL MATRL&STAF TM PHE: CPT

## 2021-04-08 PROCEDURE — 95816 EEG AWAKE AND DROWSY: CPT

## 2021-04-09 ENCOUNTER — APPOINTMENT (OUTPATIENT)
Dept: NEUROLOGY | Facility: CLINIC | Age: 61
End: 2021-04-09
Payer: MEDICAID

## 2021-04-09 PROCEDURE — 95719 EEG PHYS/QHP EA INCR W/O VID: CPT

## 2021-04-09 PROCEDURE — 95700 EEG CONT REC W/VID EEG TECH: CPT

## 2021-04-09 PROCEDURE — 95708 EEG WO VID EA 12-26HR UNMNTR: CPT

## 2021-04-09 PROCEDURE — 99072 ADDL SUPL MATRL&STAF TM PHE: CPT

## 2021-04-15 ENCOUNTER — APPOINTMENT (OUTPATIENT)
Dept: OTOLARYNGOLOGY | Facility: CLINIC | Age: 61
End: 2021-04-15
Payer: MEDICAID

## 2021-04-15 VITALS
WEIGHT: 160 LBS | SYSTOLIC BLOOD PRESSURE: 130 MMHG | TEMPERATURE: 97.3 F | HEIGHT: 60 IN | HEART RATE: 84 BPM | BODY MASS INDEX: 31.41 KG/M2 | DIASTOLIC BLOOD PRESSURE: 77 MMHG

## 2021-04-15 DIAGNOSIS — H69.83 OTHER SPECIFIED DISORDERS OF EUSTACHIAN TUBE, BILATERAL: ICD-10-CM

## 2021-04-15 DIAGNOSIS — R42 DIZZINESS AND GIDDINESS: ICD-10-CM

## 2021-04-15 DIAGNOSIS — H90.3 SENSORINEURAL HEARING LOSS, BILATERAL: ICD-10-CM

## 2021-04-15 PROCEDURE — 92553 AUDIOMETRY AIR & BONE: CPT

## 2021-04-15 PROCEDURE — 92567 TYMPANOMETRY: CPT

## 2021-04-15 PROCEDURE — 99204 OFFICE O/P NEW MOD 45 MIN: CPT

## 2021-04-15 PROCEDURE — 99072 ADDL SUPL MATRL&STAF TM PHE: CPT

## 2021-04-15 NOTE — ASSESSMENT
[FreeTextEntry1] : unsteady on feet and unstable gait\par audio wnl\par multiple factors w imbalance including neuropathy, poor vision\par and vestibular\par rec vestibular rehab program\par MR head pending

## 2021-04-15 NOTE — HISTORY OF PRESENT ILLNESS
[de-identified] : many year hx daily dizziness w room spinning can happen several x day\par no nausea, rt ear noise and plugging, can be triggered by turning over in bed or bending over\par has persitent imbalance\par no hx cva\par legally blind

## 2021-04-15 NOTE — CONSULT LETTER
[Dear  ___] : Dear  [unfilled], [Consult Letter:] : I had the pleasure of evaluating your patient, [unfilled]. [Please see my note below.] : Please see my note below. [Consult Closing:] : Thank you very much for allowing me to participate in the care of this patient.  If you have any questions, please do not hesitate to contact me. [Sincerely,] : Sincerely, [FreeTextEntry1] : Dear Dr. GARNICA CO,\par \par Thank you for your kind referral. Please refer to my enclosed office notes for ANANT HODGES . If there are any questions free to contact me.\par  [FreeTextEntry3] : Orlando Mckay MD, FACS\par

## 2021-04-15 NOTE — PHYSICAL EXAM
[Midline] : trachea located in midline position [] : Romberg test is positive [Normal] : no rashes [de-identified] : gait unsteady

## 2021-04-21 ENCOUNTER — RX RENEWAL (OUTPATIENT)
Age: 61
End: 2021-04-21

## 2021-04-21 RX ORDER — ASPIRIN 81 MG/1
81 TABLET, COATED ORAL
Qty: 30 | Refills: 0 | Status: ACTIVE | COMMUNITY
Start: 2020-07-01 | End: 1900-01-01

## 2021-05-05 ENCOUNTER — APPOINTMENT (OUTPATIENT)
Dept: RHEUMATOLOGY | Facility: CLINIC | Age: 61
End: 2021-05-05

## 2021-06-03 ENCOUNTER — RX RENEWAL (OUTPATIENT)
Age: 61
End: 2021-06-03

## 2021-06-03 ENCOUNTER — APPOINTMENT (OUTPATIENT)
Dept: NEUROLOGY | Facility: CLINIC | Age: 61
End: 2021-06-03
Payer: MEDICAID

## 2021-06-03 PROCEDURE — 99213 OFFICE O/P EST LOW 20 MIN: CPT

## 2021-06-03 PROCEDURE — 99072 ADDL SUPL MATRL&STAF TM PHE: CPT

## 2021-06-03 NOTE — ASSESSMENT
[FreeTextEntry1] : 61-year-old woman complaining of postural, transient vertigo,appears to be improving on his own. Discussed treatment options, demonstrated and discussed the use of Plasencia-Sujitoff exercises.\par return to office as needed.

## 2021-06-03 NOTE — HISTORY OF PRESENT ILLNESS
[FreeTextEntry1] : 61 -year-old woman right-handed accompanied by her daughter,history of diabetes, complaining of intermittent postural vertigo, described as short lasting transient sensation of spinning, usually when she moves or changes position. Episodes tend to last a couple seconds then go away. No associated headache or ear pain,no unilateral weakness or numbness. Reports symptoms have improved since last seen. Patient travel to [facility], and I was not able to follow through with vestibular physical therapy or MRI of the head. this past April was seen by ENT.

## 2021-06-03 NOTE — REVIEW OF SYSTEMS
[Eyesight Problems] : eyesight problems [As Noted in HPI] : as noted in HPI [Loss Of Hearing] : hearing loss [Negative] : Heme/Lymph [FreeTextEntry4] : transient vertigo

## 2021-06-16 ENCOUNTER — RX RENEWAL (OUTPATIENT)
Age: 61
End: 2021-06-16

## 2021-07-14 NOTE — PATIENT PROFILE ADULT. - HEALTHCARE QUESTIONS, PROFILE
Final Anesthesia Post-op Assessment    Patient: Maira Alcaraz  Procedure(s) Performed: LEEP (LOOP ELECTROSURGICAL EXCISION PROCEDURE)  Anesthesia type: General    Vitals Value Taken Time   Temp 36 °C (96.8 °F) 07/14/21 1139   Pulse 78 07/14/21 1139   Resp 16 07/14/21 1139   SpO2 100 % 07/14/21 1139   /94 07/14/21 1139         Patient Location: Phase II  Post-op Vital Signs:stable  Level of Consciousness: awake, alert, oriented and participates in exam  Respiratory Status: spontaneous ventilation  Cardiovascular stable  Hydration: euvolemic  Pain Management: well controlled  Handoff: Handoff to receiving nurse was performed and questions were answered  Vomiting: none  Nausea: None  Airway Patency:patent  Post-op Assessment: awake, alert, appropriately conversant, or baseline, no complications, patient tolerated procedure well with no complications, no evidence of recall, dentition within defined limits, moving all extremities and No Corneal Abrasion      No complications documented.   
None

## 2021-07-20 NOTE — ED ADULT NURSE NOTE - PAIN RATING/NUMBER SCALE (0-10): ACTIVITY
Refill fax received from [facility]- [address]     Please refill the following medication: Adderall    Current Outpatient Medications   Medication Sig Dispense Refill   • cloNIDine (CATAPRES) 0.2 MG tablet Take 1 tablet by mouth at bedtime. 30 tablet 2   • amphetamine-dextroamphetamine XR (ADDERALL XR) 15 MG 24 hr capsule Take 1 capsule by mouth daily. 30 capsule 0   • Multiple Vitamins-Minerals (HAIR SKIN AND NAILS FORMULA) Tab      • hydroCORTisone (CORTIZONE) 2.5 % ointment apply BID to rash.       No current facility-administered medications for this visit.          3

## 2021-08-03 ENCOUNTER — RX RENEWAL (OUTPATIENT)
Age: 61
End: 2021-08-03

## 2021-08-10 ENCOUNTER — EMERGENCY (EMERGENCY)
Facility: HOSPITAL | Age: 61
LOS: 0 days | Discharge: HOME | End: 2021-08-10
Attending: EMERGENCY MEDICINE
Payer: MEDICAID

## 2021-08-10 VITALS
DIASTOLIC BLOOD PRESSURE: 65 MMHG | SYSTOLIC BLOOD PRESSURE: 151 MMHG | HEART RATE: 80 BPM | RESPIRATION RATE: 16 BRPM | OXYGEN SATURATION: 100 %

## 2021-08-10 VITALS — WEIGHT: 164.91 LBS | HEIGHT: 72 IN

## 2021-08-10 DIAGNOSIS — Z86.39 PERSONAL HISTORY OF OTHER ENDOCRINE, NUTRITIONAL AND METABOLIC DISEASE: ICD-10-CM

## 2021-08-10 DIAGNOSIS — Z87.19 PERSONAL HISTORY OF OTHER DISEASES OF THE DIGESTIVE SYSTEM: ICD-10-CM

## 2021-08-10 DIAGNOSIS — E78.5 HYPERLIPIDEMIA, UNSPECIFIED: ICD-10-CM

## 2021-08-10 DIAGNOSIS — G47.00 INSOMNIA, UNSPECIFIED: ICD-10-CM

## 2021-08-10 DIAGNOSIS — E11.65 TYPE 2 DIABETES MELLITUS WITH HYPERGLYCEMIA: ICD-10-CM

## 2021-08-10 DIAGNOSIS — Z98.890 OTHER SPECIFIED POSTPROCEDURAL STATES: Chronic | ICD-10-CM

## 2021-08-10 DIAGNOSIS — Z86.711 PERSONAL HISTORY OF PULMONARY EMBOLISM: ICD-10-CM

## 2021-08-10 DIAGNOSIS — Z98.890 OTHER SPECIFIED POSTPROCEDURAL STATES: ICD-10-CM

## 2021-08-10 DIAGNOSIS — H26.9 UNSPECIFIED CATARACT: ICD-10-CM

## 2021-08-10 DIAGNOSIS — Z98.51 TUBAL LIGATION STATUS: Chronic | ICD-10-CM

## 2021-08-10 DIAGNOSIS — Z86.718 PERSONAL HISTORY OF OTHER VENOUS THROMBOSIS AND EMBOLISM: ICD-10-CM

## 2021-08-10 DIAGNOSIS — E78.00 PURE HYPERCHOLESTEROLEMIA, UNSPECIFIED: ICD-10-CM

## 2021-08-10 DIAGNOSIS — Z88.5 ALLERGY STATUS TO NARCOTIC AGENT: ICD-10-CM

## 2021-08-10 DIAGNOSIS — Z98.49 CATARACT EXTRACTION STATUS, UNSPECIFIED EYE: Chronic | ICD-10-CM

## 2021-08-10 DIAGNOSIS — M25.9 JOINT DISORDER, UNSPECIFIED: Chronic | ICD-10-CM

## 2021-08-10 DIAGNOSIS — E11.3592 TYPE 2 DIABETES MELLITUS WITH PROLIFERATIVE DIABETIC RETINOPATHY WITHOUT MACULAR EDEMA, LEFT EYE: Chronic | ICD-10-CM

## 2021-08-10 DIAGNOSIS — Z95.828 PRESENCE OF OTHER VASCULAR IMPLANTS AND GRAFTS: Chronic | ICD-10-CM

## 2021-08-10 DIAGNOSIS — R73.9 HYPERGLYCEMIA, UNSPECIFIED: ICD-10-CM

## 2021-08-10 DIAGNOSIS — K21.9 GASTRO-ESOPHAGEAL REFLUX DISEASE WITHOUT ESOPHAGITIS: ICD-10-CM

## 2021-08-10 DIAGNOSIS — Z79.82 LONG TERM (CURRENT) USE OF ASPIRIN: ICD-10-CM

## 2021-08-10 DIAGNOSIS — Z98.51 TUBAL LIGATION STATUS: ICD-10-CM

## 2021-08-10 DIAGNOSIS — Z95.828 PRESENCE OF OTHER VASCULAR IMPLANTS AND GRAFTS: ICD-10-CM

## 2021-08-10 DIAGNOSIS — Z79.899 OTHER LONG TERM (CURRENT) DRUG THERAPY: ICD-10-CM

## 2021-08-10 DIAGNOSIS — Z98.42 CATARACT EXTRACTION STATUS, LEFT EYE: ICD-10-CM

## 2021-08-10 DIAGNOSIS — R30.0 DYSURIA: ICD-10-CM

## 2021-08-10 DIAGNOSIS — Z86.73 PERSONAL HISTORY OF TRANSIENT ISCHEMIC ATTACK (TIA), AND CEREBRAL INFARCTION WITHOUT RESIDUAL DEFICITS: ICD-10-CM

## 2021-08-10 DIAGNOSIS — Z88.0 ALLERGY STATUS TO PENICILLIN: ICD-10-CM

## 2021-08-10 DIAGNOSIS — F41.8 OTHER SPECIFIED ANXIETY DISORDERS: ICD-10-CM

## 2021-08-10 DIAGNOSIS — I10 ESSENTIAL (PRIMARY) HYPERTENSION: ICD-10-CM

## 2021-08-10 DIAGNOSIS — Z98.41 CATARACT EXTRACTION STATUS, RIGHT EYE: ICD-10-CM

## 2021-08-10 LAB
ALBUMIN SERPL ELPH-MCNC: 3.9 G/DL — SIGNIFICANT CHANGE UP (ref 3.3–5)
ALP SERPL-CCNC: 104 U/L — SIGNIFICANT CHANGE UP (ref 40–120)
ALT FLD-CCNC: 36 U/L — SIGNIFICANT CHANGE UP (ref 12–78)
ANION GAP SERPL CALC-SCNC: 6 MMOL/L — SIGNIFICANT CHANGE UP (ref 5–17)
APPEARANCE UR: CLEAR — SIGNIFICANT CHANGE UP
AST SERPL-CCNC: 30 U/L — SIGNIFICANT CHANGE UP (ref 15–37)
BASOPHILS # BLD AUTO: 0.06 K/UL — SIGNIFICANT CHANGE UP (ref 0–0.2)
BASOPHILS NFR BLD AUTO: 0.6 % — SIGNIFICANT CHANGE UP (ref 0–2)
BILIRUB SERPL-MCNC: 0.3 MG/DL — SIGNIFICANT CHANGE UP (ref 0.2–1.2)
BILIRUB UR-MCNC: NEGATIVE — SIGNIFICANT CHANGE UP
BUN SERPL-MCNC: 13 MG/DL — SIGNIFICANT CHANGE UP (ref 7–23)
CALCIUM SERPL-MCNC: 9.1 MG/DL — SIGNIFICANT CHANGE UP (ref 8.5–10.1)
CHLORIDE SERPL-SCNC: 106 MMOL/L — SIGNIFICANT CHANGE UP (ref 96–108)
CO2 SERPL-SCNC: 28 MMOL/L — SIGNIFICANT CHANGE UP (ref 22–31)
COLOR SPEC: YELLOW — SIGNIFICANT CHANGE UP
CREAT SERPL-MCNC: 0.78 MG/DL — SIGNIFICANT CHANGE UP (ref 0.5–1.3)
DIFF PNL FLD: NEGATIVE — SIGNIFICANT CHANGE UP
EOSINOPHIL # BLD AUTO: 1.12 K/UL — HIGH (ref 0–0.5)
EOSINOPHIL NFR BLD AUTO: 10.4 % — HIGH (ref 0–6)
GLUCOSE SERPL-MCNC: 213 MG/DL — HIGH (ref 70–99)
GLUCOSE UR QL: 1000 MG/DL
HCT VFR BLD CALC: 39.3 % — SIGNIFICANT CHANGE UP (ref 34.5–45)
HGB BLD-MCNC: 12.5 G/DL — SIGNIFICANT CHANGE UP (ref 11.5–15.5)
IMM GRANULOCYTES NFR BLD AUTO: 0.2 % — SIGNIFICANT CHANGE UP (ref 0–1.5)
KETONES UR-MCNC: NEGATIVE — SIGNIFICANT CHANGE UP
LEUKOCYTE ESTERASE UR-ACNC: ABNORMAL
LIDOCAIN IGE QN: 78 U/L — SIGNIFICANT CHANGE UP (ref 73–393)
LYMPHOCYTES # BLD AUTO: 4.72 K/UL — HIGH (ref 1–3.3)
LYMPHOCYTES # BLD AUTO: 43.8 % — SIGNIFICANT CHANGE UP (ref 13–44)
MCHC RBC-ENTMCNC: 29.3 PG — SIGNIFICANT CHANGE UP (ref 27–34)
MCHC RBC-ENTMCNC: 31.8 GM/DL — LOW (ref 32–36)
MCV RBC AUTO: 92.3 FL — SIGNIFICANT CHANGE UP (ref 80–100)
MONOCYTES # BLD AUTO: 0.79 K/UL — SIGNIFICANT CHANGE UP (ref 0–0.9)
MONOCYTES NFR BLD AUTO: 7.3 % — SIGNIFICANT CHANGE UP (ref 2–14)
NEUTROPHILS # BLD AUTO: 4.07 K/UL — SIGNIFICANT CHANGE UP (ref 1.8–7.4)
NEUTROPHILS NFR BLD AUTO: 37.7 % — LOW (ref 43–77)
NITRITE UR-MCNC: NEGATIVE — SIGNIFICANT CHANGE UP
PH UR: 7 — SIGNIFICANT CHANGE UP (ref 5–8)
PLATELET # BLD AUTO: 328 K/UL — SIGNIFICANT CHANGE UP (ref 150–400)
POTASSIUM SERPL-MCNC: 4.1 MMOL/L — SIGNIFICANT CHANGE UP (ref 3.5–5.3)
POTASSIUM SERPL-SCNC: 4.1 MMOL/L — SIGNIFICANT CHANGE UP (ref 3.5–5.3)
PROT SERPL-MCNC: 7.7 GM/DL — SIGNIFICANT CHANGE UP (ref 6–8.3)
PROT UR-MCNC: NEGATIVE MG/DL — SIGNIFICANT CHANGE UP
RBC # BLD: 4.26 M/UL — SIGNIFICANT CHANGE UP (ref 3.8–5.2)
RBC # FLD: 13.7 % — SIGNIFICANT CHANGE UP (ref 10.3–14.5)
SODIUM SERPL-SCNC: 140 MMOL/L — SIGNIFICANT CHANGE UP (ref 135–145)
SP GR SPEC: 1 — LOW (ref 1.01–1.02)
UROBILINOGEN FLD QL: NEGATIVE MG/DL — SIGNIFICANT CHANGE UP
WBC # BLD: 10.78 K/UL — HIGH (ref 3.8–10.5)
WBC # FLD AUTO: 10.78 K/UL — HIGH (ref 3.8–10.5)

## 2021-08-10 PROCEDURE — 85025 COMPLETE CBC W/AUTO DIFF WBC: CPT

## 2021-08-10 PROCEDURE — 93010 ELECTROCARDIOGRAM REPORT: CPT

## 2021-08-10 PROCEDURE — 96374 THER/PROPH/DIAG INJ IV PUSH: CPT

## 2021-08-10 PROCEDURE — 81001 URINALYSIS AUTO W/SCOPE: CPT

## 2021-08-10 PROCEDURE — 93005 ELECTROCARDIOGRAM TRACING: CPT

## 2021-08-10 PROCEDURE — 99284 EMERGENCY DEPT VISIT MOD MDM: CPT | Mod: 25

## 2021-08-10 PROCEDURE — 87077 CULTURE AEROBIC IDENTIFY: CPT

## 2021-08-10 PROCEDURE — 36415 COLL VENOUS BLD VENIPUNCTURE: CPT

## 2021-08-10 PROCEDURE — 99285 EMERGENCY DEPT VISIT HI MDM: CPT

## 2021-08-10 PROCEDURE — 80053 COMPREHEN METABOLIC PANEL: CPT

## 2021-08-10 PROCEDURE — 83690 ASSAY OF LIPASE: CPT

## 2021-08-10 PROCEDURE — 87186 SC STD MICRODIL/AGAR DIL: CPT

## 2021-08-10 PROCEDURE — 87086 URINE CULTURE/COLONY COUNT: CPT

## 2021-08-10 RX ORDER — KETOROLAC TROMETHAMINE 30 MG/ML
15 SYRINGE (ML) INJECTION ONCE
Refills: 0 | Status: DISCONTINUED | OUTPATIENT
Start: 2021-08-10 | End: 2021-08-10

## 2021-08-10 RX ORDER — SODIUM CHLORIDE 9 MG/ML
1000 INJECTION INTRAMUSCULAR; INTRAVENOUS; SUBCUTANEOUS ONCE
Refills: 0 | Status: COMPLETED | OUTPATIENT
Start: 2021-08-10 | End: 2021-08-10

## 2021-08-10 RX ORDER — ACETAMINOPHEN 500 MG
1000 TABLET ORAL ONCE
Refills: 0 | Status: COMPLETED | OUTPATIENT
Start: 2021-08-10 | End: 2021-08-10

## 2021-08-10 RX ADMIN — Medication 1000 MILLIGRAM(S): at 18:22

## 2021-08-10 RX ADMIN — Medication 15 MILLIGRAM(S): at 20:04

## 2021-08-10 RX ADMIN — SODIUM CHLORIDE 2000 MILLILITER(S): 9 INJECTION INTRAMUSCULAR; INTRAVENOUS; SUBCUTANEOUS at 18:22

## 2021-08-10 NOTE — ED ADULT NURSE NOTE - NSIMPLEMENTINTERV_GEN_ALL_ED
Implemented All Fall Risk Interventions:  [place] to call system. Call bell, personal items and telephone within reach. Instruct patient to call for assistance. Room bathroom lighting operational. Non-slip footwear when patient is off stretcher. Physically safe environment: no spills, clutter or unnecessary equipment. Stretcher in lowest position, wheels locked, appropriate side rails in place. Provide visual cue, wrist band, yellow gown, etc. Monitor gait and stability. Monitor for mental status changes and reorient to person, place, and time. Review medications for side effects contributing to fall risk. Reinforce activity limits and safety measures with patient and family.

## 2021-08-10 NOTE — ED STATDOCS - NSFOLLOWUPINSTRUCTIONS_ED_ALL_ED_FT
Hiperglucemia  Hyperglycemia  La hiperglucemia se produce cuando el nivel de azúcar (glucosa) en la arianna es demasiado alto. La glucosa es un tipo de azúcar que constituye la principal yemi de energía del cuerpo. Determinadas hormonas, mahsa la insulina y el glucagón, controlan la glucosa en la arianna. La insulina baja la glucemia, mientras que el glucagón la aumenta. La hiperglucemia puede ser el resultado de gilma cantidad muy escasa de insulina en el torrente sanguíneo o de gilma respuesta anómala del organismo a la insulina.  La mayoría de las veces, aparece en personas que tienen diabetes (diabetes mellitus), aunque puede manifestarse en personas que no padecen esta enfermedad. Se puede desarrollar rápidamente y, cuando provoca deshidratación aguda (cetoacidocis diabética o estado hiperosmolar hiperglucémico ), puede ser potencialmente mortal. La hiperglucemia es gilma emergencia médica.  ¿Cuáles son las causas?  Si tiene diabetes, las causas de la hiperglucemia pueden ser las siguientes:  Medicamentos para la diabetes.Medicamentos que aumentan la glucemia o que afectan el control de la diabetes.No comer lo suficiente o no comer con la frecuencia suficiente.Cambios en el nivel de la actividad física.Estar enfermo o tener gilma infección.Si tiene prediabetes o gilma diabetes no diagnosticada:  Estas afecciones pueden causar la hiperglucemia.Si no tiene diabetes, las causas de la hiperglucemia pueden ser las siguientes:  Algunos medicamentos, incluidos los corticoesteroides, betabloqueantes, epinefrina y diuréticos tiazídicos.Estrés.Enfermedad grave.Cirugía.Enfermedades del páncreas.Infección.¿Qué incrementa el riesgo?  Es más probable que gilma persona con factores de riesgo de diabetes, tenga hiperglucemia; por ejemplo:  Tener un familiar con diabetes.Ser portador de un gen de la diabetes tipo 1 que se transmite de padres a hijos (hereditario).Vivir en gilma stefanie con tiempo frío.Exposición a determinados virus.Algunas afecciones en las que el sistema del cuerpo encargado de combatir las enfermedades (inmunitario) se ataca a sí mismo (trastornos autoinmunitarios).Tener exceso de peso u obesidad.Tener un estilo de fadumo inactivo (sedentario).Sunita sido diagnosticado con resistencia a la insulina.Tener antecedentes de prediabetes, diabetes gestacional o síndrome de ovario poliquístico (SOP).Ser descendiente de indígenas norteamericanos, afroamericanos, hispanos o latinos, o asiáticos o isleños del [facility].¿Cuáles son los signos o los síntomas?  Es posible que la hiperglucemia no cause ningún síntoma. Si tiene síntomas, estos pueden incluir signos tempranos de advertencia, mahsa los siguientes:  Aumento de la sed.Hambre.Mucho cansancio.Necesidad de orinar con mayor frecuencia que lo habitual.Visión borrosa.Cuando la hiperglucemia empeora, pueden aparecer otros síntomas, mahsa los siguientes:  [place].Pérdida del apetito.Aliento con olor a fruta.Debilidad.Aumento o pérdida de peso repentinos o rápidos.Hormigueo o adormecimiento de las shaila o los pies.Dolor de jeremy.Piel que no vuelve rápidamente a la normalidad cuando se la suelta luego de pellizcarla ligeramente (persistencia del pliegue cutáneo).Dolor abdominal.Florez o moretones que tardan en sanarse.¿Cómo se diagnostica?  La hiperglucemia se diagnostica con un análisis de arianna para medir el nivel de glucemia. Dariela análisis de arianna se realiza normalmente cuando tiene los síntomas. El médico también puede hacerle un examen físico y revisar sandra antecedentes médicos.  Es posible que deba hacerse otros estudios para determinar la causa de antoine hiperglucemia, mahsa, por ejemplo:  Medición de la glucemia en ayunas. No se le permitirá comer (tendrá que estar en ayunas) nima al menos 8 horas antes de que se le tome gilma muestra de arianna.Un análisis de arianna de A1c (hemoglobina A1c). Dariela análisis proporciona información sobre el control de la glucemia nima los últimos 2 o 3 meses.Prueba de tolerancia a la glucosa oral (PTGO). Esta prueba mide la glucemia en dos momentos:  Después de ayunar. Dariela es el valor inicial de la glucemia.Dos horas después de michael gilma bebida que contiene glucosa.¿Cómo se trata?  El tratamiento depende de la causa de la hiperglucemia. El tratamiento puede incluir lo siguiente:  Medicamentos para regular los niveles de glucemia. Si recibe insulina u otro medicamento para la diabetes, es posible que se deba corregir la dosis o el medicamento.Cambios en el estilo de fadumo, mahsa hacer más actividad física, comer alimentos más saludables o bajar de peso.El tratamiento de la enfermedad o la infección, si son la causa de la hiperglucemia.Control de la glucemia con mayor frecuencia.La suspensión o la reducción de los corticoesteroides, si son la causa de la hiperglucemia.Si la hiperglucemia se agrava y ocasiona un estado hiperosmolar hiperglucémico, deberá ser hospitalizado y recibir líquidos por vía intravenosa (IV).  Siga estas indicaciones en antoine casa:     Instrucciones generales     Hume los medicamentos de venta chai y los recetados solamente mahsa se lo haya indicado el médico.No consuma ningún producto que contenga nicotina o tabaco, mahsa cigarrillos y cigarrillos electrónicos. Si necesita ayuda para dejar de fumar, consulte al médico.Limite el consumo de alcohol a no más de 1 medida por día si es man y no está embarazada, y a 2 medidas si es hombre. Gilma medida equivale a 12 oz (355 ml) de cerveza, 5 oz (148 ml) de vino o 1 ½ oz (44 ml) de bebidas alcohólicas de gem graduación.Aprenda a manejar el estrés. Si necesita ayuda para lograrlo, consulte a antoine médico.Concurra a todas las visitas de seguimiento mahsa se lo haya indicado el médico. [place] es importante.Comida y bebida        Mantenga un peso saludable.Jeancarlos ejercicio regularmente mahsa se lo haya indicado el médico.Manténgase hidratado, especialmente cuando jeancarlos actividad física, esté enfermo o pase tiempo en temperaturas elevadas.Consuma alimentos saludables, por ejemplo:  Proteínas magras.Hidratos de carbono complejos.Frutas y verduras frescas.Productos lácteos descremados.Grasas saludables.Liliya suficiente líquido para mantener la orina mychal o de color amarillo pálido.Si usted tiene diabetes:     Asegúrese de conocer los síntomas de la hiperglucemia.Siga el plan de control de la diabetes mahsa se lo haya indicado el médico. Jeancarlos lo siguiente:  Adminístrese la insulina y los medicamentos mahsa se le indicó.Siga el plan de ejercicio.Siga el plan de comidas. Coma a horario y no se saltee comidas.Contrólese la glucemia con la frecuencia que le hayan indicado. Contrólese la glucemia antes y después de hacer actividad física. Si hace ejercicio nima más tiempo o de manera diferente de lo habitual, contrólese la glucemia con mayor frecuencia.Siga el plan para los días de enfermedad cuando no pueda comer o beber con normalidad. Carmela dariela plan por adelantado con el médico.Comparta antoine plan de control de la diabetes con sandra compañeros de trabajo y de la escuela, y con las otras personas que viven en antoine hogar.Contrólese las cetonas en la orina cuando esté enfermo y mahsa se lo haya indicado el médico.Lleve gilma tarjeta de alerta médica o use un brazalete o medalla de alerta médica.Comuníquese con un médico si:  El nivel de glucemia es mayor o igual que 240 mg/dl (13,3 mmol/dl) nima 2 días seguidos.Tiene problemas para mantener la glucemia dentro del rango ideal.Tiene episodios frecuentes de hiperglucemia.Solicite ayuda de inmediato si:  Tiene dificultad para respirar.Tiene cambios en la manera de sentirse, pensar o actuar (estado mental).Tiene náuseas o vómitos que no desaparecen.Estos síntomas pueden representar un problema grave que constituye gilma emergencia. No espere hasta que los síntomas desaparezcan. Solicite atención médica de inmediato. Comuníquese con el servicio de emergencias de antoine localidad (911 en los [place]). No conduzca por sandra propios medios hasta el hospital.   Resumen  La hiperglucemia se produce cuando el nivel de azúcar (glucosa) en la arianna es demasiado alto.La hiperglucemia se diagnostica con un análisis de arianna para medir el nivel de glucemia. Dariela análisis de arianna se realiza normalmente cuando tiene los síntomas. El médico también puede hacerle un examen físico y revisar sandra antecedentes médicos.Si tiene diabetes, siga el plan de control de la diabetes mahsa se lo haya indicado el médico.Comuníquese con el médico si tiene problemas para mantener la glucemia dentro del intervalo deseado.Esta información no tiene mahsa fin reemplazar el consejo del médico. Asegúrese de hacerle al médico cualquier pregunta que tenga.

## 2021-08-10 NOTE — ED STATDOCS - NSICDXPASTSURGICALHX_GEN_ALL_CORE_FT
PAST SURGICAL HISTORY:  [place] filter in place left    H/O tubal ligation     History of cystoscopy history  nephrolithiasis and recurrent UTI S/P ESWL 5/2016    History of loop recorder     Proliferative diabetic retinopathy of left eye s/p PPV/laser/silicone  5/31/16 and 11/29/16 OS    S/P cataract surgery b/l    Shoulder disorder left

## 2021-08-10 NOTE — ED STATDOCS - NSFOLLOWUPCLINICS_GEN_ALL_ED_FT
[facility] hospital  Family Medicine  [address] [place] [address]  Phone: [phone]  Fax:   Follow Up Time: 1-3 Days

## 2021-08-10 NOTE — ED STATDOCS - CLINICAL SUMMARY MEDICAL DECISION MAKING FREE TEXT BOX
62 yo pt presents to ED for dysuria and flank pain.  Plan check labs, u/a 62 yo pt presents to ED for dysuria and flank pain.  Plan check labs, u/a    PA: Pt c/o increased urinary frequency. Discussed in detail with patient regarding hyperglycemia, maybe causing increased frequency. No obvious signs of UTI. Remaining labs pending. Care transitioned to DILIP Martinez. ~Terrence Carpio PA-C

## 2021-08-10 NOTE — ED ADULT NURSE NOTE - NSICDXPASTSURGICALHX_GEN_ALL_CORE_FT
PAST SURGICAL HISTORY:  [place] filter in place left    H/O tubal ligation     History of cystoscopy history  nephrolithiasis and recurrent UTI S/P ESWL 5/2016    History of loop recorder     Proliferative diabetic retinopathy of left eye s/p PPV/laser/silicone  5/31/16 and 11/29/16 OS    S/P cataract surgery b/l    Shoulder disorder left

## 2021-08-10 NOTE — ED ADULT NURSE NOTE - OBJECTIVE STATEMENT
pt is 62 yo female presents to ED for upper back and dysuria x 1 week, "pain upon urination."  as per dtr, pt is legally blind.

## 2021-08-10 NOTE — ED STATDOCS - PHYSICAL EXAMINATION
PA NOTE: GEN: AOX3, NAD. HEENT: Throat clear. Airway is patent. EYES: PERRLA. EOMI. Head: NC/AT. NECK: Supple, No JVD. FROM. C-spine non-tender. CV:S1S2, RRR, LUNGS: Non-labored breathing, no tachypnea. O2sat 100% RA. CTA b/l. No w/r/r. CHEST: Equal chest expansion and rise. No deformity. ABD: Soft, NT/ND, no rebound, no guarding. No CVAT. EXT: No e/c/c. 2+ distal pulses. SKIN: No rashes. NEURO: No focal deficits. CN II-XII intact. FROM. 5/5 motor and sensory. ~Terrence Carpio PA-C

## 2021-08-10 NOTE — ED STATDOCS - OBJECTIVE STATEMENT
60 yo Pashto speaking and would like daughter to translate.  Pt presents to ED for pain with urination, back pain.  pt started with symptoms 1 week ago.  pt feels bloated. Some nausea, no vomit, no diarrhea, no travel.  pt also with some left arm pain.  No fever, no fall.  No travel.  No chest pain.

## 2021-08-10 NOTE — ED STATDOCS - NSICDXPASTMEDICALHX_GEN_ALL_CORE_FT
PAST MEDICAL HISTORY:  Anxiety     Cataract b/l    Constipation     Diabetes     DJD (degenerative joint disease)     DM (diabetes mellitus)     Dvt femoral (deep venous thrombosis) left leg in 2014    GERD (gastroesophageal reflux disease)     High cholesterol     History of TIA (transient ischemic attack) and stroke     HLD (hyperlipidemia)     Hypertension     Insomnia     Neuropathy     Neuropathy     Pseudophakia     Pulmonary embolism     Syncope     Type 2 diabetes mellitus     Vitamin D deficiency

## 2021-08-10 NOTE — ED STATDOCS - PROGRESS NOTE DETAILS
PA: Patient is a 61 yr old female with PMHx of DM who presents with increased urinary frequency x 1 week. DENIES fever, hematuria.   Kiswahili speaking and would like daughter to translate. Pt feels bloated. Some nausea, no vomit, no diarrhea, no travel. ~Terrence Carpio PA-C   Pt seen and evaluated in . Will get labs. Reassess. ~Terrence Carpio PA-C PA: Discussed in detail with patient regarding hyperglycemia, maybe causing increased frequency. No obvious signs of UTI. Remaining labs pending. Care transitioned to DILIP Martinez. ~Terrence Carpio PA-C

## 2021-08-10 NOTE — ED STATDOCS - ATTENDING CONTRIBUTION TO CARE
I, Allison Ramirez MD,  performed the initial face to face bedside interview with this patient regarding history of present illness, review of symptoms and relevant past medical, social and family history.  I completed an independent physical examination.  I was the initial provider who evaluated this patient. I have signed out the follow up of any pending tests (i.e. labs, radiological studies) to the ACP.  I have communicated the patient’s plan of care and disposition with the ACP.

## 2021-08-10 NOTE — ED STATDOCS - PATIENT PORTAL LINK FT
You can access the FollowMyHealth Patient Portal offered by [facility] by registering at the following website: [email or web address]. By joining [employer]’s FollowMyHealth portal, you will also be able to view your health information using other applications (apps) compatible with our system.

## 2021-08-11 ENCOUNTER — OUTPATIENT (OUTPATIENT)
Dept: OUTPATIENT SERVICES | Facility: HOSPITAL | Age: 61
LOS: 1 days | End: 2021-08-11
Payer: MEDICAID

## 2021-08-11 ENCOUNTER — APPOINTMENT (OUTPATIENT)
Dept: RADIOLOGY | Facility: CLINIC | Age: 61
End: 2021-08-11
Payer: MEDICAID

## 2021-08-11 DIAGNOSIS — E11.3592 TYPE 2 DIABETES MELLITUS WITH PROLIFERATIVE DIABETIC RETINOPATHY WITHOUT MACULAR EDEMA, LEFT EYE: Chronic | ICD-10-CM

## 2021-08-11 DIAGNOSIS — Z95.828 PRESENCE OF OTHER VASCULAR IMPLANTS AND GRAFTS: Chronic | ICD-10-CM

## 2021-08-11 DIAGNOSIS — Z98.51 TUBAL LIGATION STATUS: Chronic | ICD-10-CM

## 2021-08-11 DIAGNOSIS — Z98.890 OTHER SPECIFIED POSTPROCEDURAL STATES: Chronic | ICD-10-CM

## 2021-08-11 DIAGNOSIS — Z98.49 CATARACT EXTRACTION STATUS, UNSPECIFIED EYE: Chronic | ICD-10-CM

## 2021-08-11 DIAGNOSIS — M25.9 JOINT DISORDER, UNSPECIFIED: Chronic | ICD-10-CM

## 2021-08-11 DIAGNOSIS — M25.512 PAIN IN LEFT SHOULDER: ICD-10-CM

## 2021-08-11 PROCEDURE — 73030 X-RAY EXAM OF SHOULDER: CPT

## 2021-08-11 PROCEDURE — 73030 X-RAY EXAM OF SHOULDER: CPT | Mod: 26,LT

## 2021-08-13 LAB
-  AMIKACIN: SIGNIFICANT CHANGE UP
-  AMOXICILLIN/CLAVULANIC ACID: SIGNIFICANT CHANGE UP
-  AMPICILLIN/SULBACTAM: SIGNIFICANT CHANGE UP
-  AMPICILLIN: SIGNIFICANT CHANGE UP
-  AZTREONAM: SIGNIFICANT CHANGE UP
-  CEFAZOLIN: SIGNIFICANT CHANGE UP
-  CEFEPIME: SIGNIFICANT CHANGE UP
-  CEFOTAXIME: SIGNIFICANT CHANGE UP
-  CEFOXITIN: SIGNIFICANT CHANGE UP
-  CEFTAZIDIME: SIGNIFICANT CHANGE UP
-  CEFTRIAXONE: SIGNIFICANT CHANGE UP
-  CEFUROXIME: SIGNIFICANT CHANGE UP
-  CIPROFLOXACIN: SIGNIFICANT CHANGE UP
-  ERTAPENEM: SIGNIFICANT CHANGE UP
-  GENTAMICIN: SIGNIFICANT CHANGE UP
-  IMIPENEM: SIGNIFICANT CHANGE UP
-  LEVOFLOXACIN: SIGNIFICANT CHANGE UP
-  MEROPENEM: SIGNIFICANT CHANGE UP
-  MINOCYCLINE: SIGNIFICANT CHANGE UP
-  NITROFURANTOIN: SIGNIFICANT CHANGE UP
-  PIPERACILLIN/TAZOBACTAM: SIGNIFICANT CHANGE UP
-  TIGECYCLINE: SIGNIFICANT CHANGE UP
-  TOBRAMYCIN: SIGNIFICANT CHANGE UP
-  TRIMETHOPRIM/SULFAMETHOXAZOLE: SIGNIFICANT CHANGE UP
CULTURE RESULTS: SIGNIFICANT CHANGE UP
METHOD TYPE: SIGNIFICANT CHANGE UP
ORGANISM # SPEC MICROSCOPIC CNT: SIGNIFICANT CHANGE UP
ORGANISM # SPEC MICROSCOPIC CNT: SIGNIFICANT CHANGE UP
SPECIMEN SOURCE: SIGNIFICANT CHANGE UP

## 2021-08-16 ENCOUNTER — EMERGENCY (EMERGENCY)
Facility: HOSPITAL | Age: 61
LOS: 0 days | Discharge: HOME | End: 2021-08-16
Attending: EMERGENCY MEDICINE
Payer: MEDICAID

## 2021-08-16 VITALS — WEIGHT: 169.98 LBS | HEIGHT: 60 IN

## 2021-08-16 VITALS
RESPIRATION RATE: 18 BRPM | OXYGEN SATURATION: 98 % | TEMPERATURE: 98 F | HEART RATE: 89 BPM | DIASTOLIC BLOOD PRESSURE: 77 MMHG | SYSTOLIC BLOOD PRESSURE: 155 MMHG

## 2021-08-16 DIAGNOSIS — Z98.51 TUBAL LIGATION STATUS: ICD-10-CM

## 2021-08-16 DIAGNOSIS — Z98.49 CATARACT EXTRACTION STATUS, UNSPECIFIED EYE: ICD-10-CM

## 2021-08-16 DIAGNOSIS — E11.3592 TYPE 2 DIABETES MELLITUS WITH PROLIFERATIVE DIABETIC RETINOPATHY WITHOUT MACULAR EDEMA, LEFT EYE: Chronic | ICD-10-CM

## 2021-08-16 DIAGNOSIS — E11.9 TYPE 2 DIABETES MELLITUS WITHOUT COMPLICATIONS: ICD-10-CM

## 2021-08-16 DIAGNOSIS — R07.89 OTHER CHEST PAIN: ICD-10-CM

## 2021-08-16 DIAGNOSIS — Z98.51 TUBAL LIGATION STATUS: Chronic | ICD-10-CM

## 2021-08-16 DIAGNOSIS — K21.9 GASTRO-ESOPHAGEAL REFLUX DISEASE WITHOUT ESOPHAGITIS: ICD-10-CM

## 2021-08-16 DIAGNOSIS — M19.90 UNSPECIFIED OSTEOARTHRITIS, UNSPECIFIED SITE: ICD-10-CM

## 2021-08-16 DIAGNOSIS — Z98.890 OTHER SPECIFIED POSTPROCEDURAL STATES: ICD-10-CM

## 2021-08-16 DIAGNOSIS — Z86.69 PERSONAL HISTORY OF OTHER DISEASES OF THE NERVOUS SYSTEM AND SENSE ORGANS: ICD-10-CM

## 2021-08-16 DIAGNOSIS — Z98.49 CATARACT EXTRACTION STATUS, UNSPECIFIED EYE: Chronic | ICD-10-CM

## 2021-08-16 DIAGNOSIS — E78.00 PURE HYPERCHOLESTEROLEMIA, UNSPECIFIED: ICD-10-CM

## 2021-08-16 DIAGNOSIS — Z88.0 ALLERGY STATUS TO PENICILLIN: ICD-10-CM

## 2021-08-16 DIAGNOSIS — G47.00 INSOMNIA, UNSPECIFIED: ICD-10-CM

## 2021-08-16 DIAGNOSIS — E78.5 HYPERLIPIDEMIA, UNSPECIFIED: ICD-10-CM

## 2021-08-16 DIAGNOSIS — Z95.828 PRESENCE OF OTHER VASCULAR IMPLANTS AND GRAFTS: Chronic | ICD-10-CM

## 2021-08-16 DIAGNOSIS — Z86.73 PERSONAL HISTORY OF TRANSIENT ISCHEMIC ATTACK (TIA), AND CEREBRAL INFARCTION WITHOUT RESIDUAL DEFICITS: ICD-10-CM

## 2021-08-16 DIAGNOSIS — Z88.5 ALLERGY STATUS TO NARCOTIC AGENT: ICD-10-CM

## 2021-08-16 DIAGNOSIS — M25.9 JOINT DISORDER, UNSPECIFIED: Chronic | ICD-10-CM

## 2021-08-16 DIAGNOSIS — H26.9 UNSPECIFIED CATARACT: ICD-10-CM

## 2021-08-16 DIAGNOSIS — E55.9 VITAMIN D DEFICIENCY, UNSPECIFIED: ICD-10-CM

## 2021-08-16 DIAGNOSIS — Z98.890 OTHER SPECIFIED POSTPROCEDURAL STATES: Chronic | ICD-10-CM

## 2021-08-16 DIAGNOSIS — Z86.711 PERSONAL HISTORY OF PULMONARY EMBOLISM: ICD-10-CM

## 2021-08-16 DIAGNOSIS — Z98.41 CATARACT EXTRACTION STATUS, RIGHT EYE: ICD-10-CM

## 2021-08-16 DIAGNOSIS — K59.00 CONSTIPATION, UNSPECIFIED: ICD-10-CM

## 2021-08-16 DIAGNOSIS — G62.9 POLYNEUROPATHY, UNSPECIFIED: ICD-10-CM

## 2021-08-16 DIAGNOSIS — M25.512 PAIN IN LEFT SHOULDER: ICD-10-CM

## 2021-08-16 DIAGNOSIS — Z79.82 LONG TERM (CURRENT) USE OF ASPIRIN: ICD-10-CM

## 2021-08-16 DIAGNOSIS — F41.9 ANXIETY DISORDER, UNSPECIFIED: ICD-10-CM

## 2021-08-16 DIAGNOSIS — Z98.42 CATARACT EXTRACTION STATUS, LEFT EYE: ICD-10-CM

## 2021-08-16 LAB
ALBUMIN SERPL ELPH-MCNC: 4.1 G/DL — SIGNIFICANT CHANGE UP (ref 3.3–5)
ALP SERPL-CCNC: 108 U/L — SIGNIFICANT CHANGE UP (ref 40–120)
ALT FLD-CCNC: 38 U/L — SIGNIFICANT CHANGE UP (ref 12–78)
ANION GAP SERPL CALC-SCNC: 7 MMOL/L — SIGNIFICANT CHANGE UP (ref 5–17)
APPEARANCE UR: CLEAR — SIGNIFICANT CHANGE UP
AST SERPL-CCNC: 30 U/L — SIGNIFICANT CHANGE UP (ref 15–37)
BASOPHILS # BLD AUTO: 0 K/UL — SIGNIFICANT CHANGE UP (ref 0–0.2)
BASOPHILS NFR BLD AUTO: 0 % — SIGNIFICANT CHANGE UP (ref 0–2)
BILIRUB SERPL-MCNC: 0.3 MG/DL — SIGNIFICANT CHANGE UP (ref 0.2–1.2)
BILIRUB UR-MCNC: NEGATIVE — SIGNIFICANT CHANGE UP
BUN SERPL-MCNC: 10 MG/DL — SIGNIFICANT CHANGE UP (ref 7–23)
CALCIUM SERPL-MCNC: 9.3 MG/DL — SIGNIFICANT CHANGE UP (ref 8.5–10.1)
CHLORIDE SERPL-SCNC: 104 MMOL/L — SIGNIFICANT CHANGE UP (ref 96–108)
CO2 SERPL-SCNC: 28 MMOL/L — SIGNIFICANT CHANGE UP (ref 22–31)
COLOR SPEC: YELLOW — SIGNIFICANT CHANGE UP
CREAT SERPL-MCNC: 0.77 MG/DL — SIGNIFICANT CHANGE UP (ref 0.5–1.3)
D DIMER BLD IA.RAPID-MCNC: 220 NG/ML DDU — SIGNIFICANT CHANGE UP
DIFF PNL FLD: NEGATIVE — SIGNIFICANT CHANGE UP
EOSINOPHIL # BLD AUTO: 0.56 K/UL — HIGH (ref 0–0.5)
EOSINOPHIL NFR BLD AUTO: 5 % — SIGNIFICANT CHANGE UP (ref 0–6)
GLUCOSE SERPL-MCNC: 176 MG/DL — HIGH (ref 70–99)
GLUCOSE UR QL: 50 MG/DL
HCT VFR BLD CALC: 39.6 % — SIGNIFICANT CHANGE UP (ref 34.5–45)
HGB BLD-MCNC: 13.2 G/DL — SIGNIFICANT CHANGE UP (ref 11.5–15.5)
KETONES UR-MCNC: NEGATIVE — SIGNIFICANT CHANGE UP
LEUKOCYTE ESTERASE UR-ACNC: ABNORMAL
LYMPHOCYTES # BLD AUTO: 47 % — HIGH (ref 13–44)
LYMPHOCYTES # BLD AUTO: 5.24 K/UL — HIGH (ref 1–3.3)
MCHC RBC-ENTMCNC: 30.4 PG — SIGNIFICANT CHANGE UP (ref 27–34)
MCHC RBC-ENTMCNC: 33.3 GM/DL — SIGNIFICANT CHANGE UP (ref 32–36)
MCV RBC AUTO: 91.2 FL — SIGNIFICANT CHANGE UP (ref 80–100)
MONOCYTES # BLD AUTO: 1.12 K/UL — HIGH (ref 0–0.9)
MONOCYTES NFR BLD AUTO: 10 % — SIGNIFICANT CHANGE UP (ref 2–14)
NEUTROPHILS # BLD AUTO: 4.24 K/UL — SIGNIFICANT CHANGE UP (ref 1.8–7.4)
NEUTROPHILS NFR BLD AUTO: 38 % — LOW (ref 43–77)
NITRITE UR-MCNC: NEGATIVE — SIGNIFICANT CHANGE UP
NRBC # BLD: SIGNIFICANT CHANGE UP /100 WBCS (ref 0–0)
NT-PROBNP SERPL-SCNC: 35 PG/ML — SIGNIFICANT CHANGE UP (ref 0–125)
PH UR: 7 — SIGNIFICANT CHANGE UP (ref 5–8)
PLATELET # BLD AUTO: 320 K/UL — SIGNIFICANT CHANGE UP (ref 150–400)
POTASSIUM SERPL-MCNC: 4.1 MMOL/L — SIGNIFICANT CHANGE UP (ref 3.5–5.3)
POTASSIUM SERPL-SCNC: 4.1 MMOL/L — SIGNIFICANT CHANGE UP (ref 3.5–5.3)
PROT SERPL-MCNC: 7.7 GM/DL — SIGNIFICANT CHANGE UP (ref 6–8.3)
PROT UR-MCNC: NEGATIVE MG/DL — SIGNIFICANT CHANGE UP
RBC # BLD: 4.34 M/UL — SIGNIFICANT CHANGE UP (ref 3.8–5.2)
RBC # FLD: 13.2 % — SIGNIFICANT CHANGE UP (ref 10.3–14.5)
SODIUM SERPL-SCNC: 139 MMOL/L — SIGNIFICANT CHANGE UP (ref 135–145)
SP GR SPEC: 1.01 — SIGNIFICANT CHANGE UP (ref 1.01–1.02)
TROPONIN I SERPL-MCNC: <0.015 NG/ML — SIGNIFICANT CHANGE UP (ref 0.01–0.04)
UROBILINOGEN FLD QL: NEGATIVE MG/DL — SIGNIFICANT CHANGE UP
WBC # BLD: 11.15 K/UL — HIGH (ref 3.8–10.5)
WBC # FLD AUTO: 11.15 K/UL — HIGH (ref 3.8–10.5)

## 2021-08-16 PROCEDURE — 99285 EMERGENCY DEPT VISIT HI MDM: CPT

## 2021-08-16 PROCEDURE — 87086 URINE CULTURE/COLONY COUNT: CPT

## 2021-08-16 PROCEDURE — 87186 SC STD MICRODIL/AGAR DIL: CPT

## 2021-08-16 PROCEDURE — 87077 CULTURE AEROBIC IDENTIFY: CPT

## 2021-08-16 PROCEDURE — 93010 ELECTROCARDIOGRAM REPORT: CPT

## 2021-08-16 PROCEDURE — 96375 TX/PRO/DX INJ NEW DRUG ADDON: CPT

## 2021-08-16 PROCEDURE — 96374 THER/PROPH/DIAG INJ IV PUSH: CPT

## 2021-08-16 PROCEDURE — 99284 EMERGENCY DEPT VISIT MOD MDM: CPT | Mod: 25

## 2021-08-16 PROCEDURE — 80053 COMPREHEN METABOLIC PANEL: CPT

## 2021-08-16 PROCEDURE — 71046 X-RAY EXAM CHEST 2 VIEWS: CPT | Mod: 26

## 2021-08-16 PROCEDURE — 93005 ELECTROCARDIOGRAM TRACING: CPT

## 2021-08-16 PROCEDURE — 83880 ASSAY OF NATRIURETIC PEPTIDE: CPT

## 2021-08-16 PROCEDURE — 81001 URINALYSIS AUTO W/SCOPE: CPT

## 2021-08-16 PROCEDURE — 36415 COLL VENOUS BLD VENIPUNCTURE: CPT

## 2021-08-16 PROCEDURE — 85025 COMPLETE CBC W/AUTO DIFF WBC: CPT

## 2021-08-16 PROCEDURE — 85379 FIBRIN DEGRADATION QUANT: CPT

## 2021-08-16 PROCEDURE — 71046 X-RAY EXAM CHEST 2 VIEWS: CPT

## 2021-08-16 PROCEDURE — 84484 ASSAY OF TROPONIN QUANT: CPT

## 2021-08-16 RX ORDER — KETOROLAC TROMETHAMINE 30 MG/ML
30 SYRINGE (ML) INJECTION ONCE
Refills: 0 | Status: DISCONTINUED | OUTPATIENT
Start: 2021-08-16 | End: 2021-08-16

## 2021-08-16 RX ORDER — FAMOTIDINE 10 MG/ML
20 INJECTION INTRAVENOUS ONCE
Refills: 0 | Status: COMPLETED | OUTPATIENT
Start: 2021-08-16 | End: 2021-08-16

## 2021-08-16 RX ADMIN — FAMOTIDINE 20 MILLIGRAM(S): 10 INJECTION INTRAVENOUS at 14:18

## 2021-08-16 RX ADMIN — Medication 30 MILLIGRAM(S): at 14:18

## 2021-08-16 NOTE — ED STATDOCS - OBJECTIVE STATEMENT
62 y/o F with a PMHx of anxiety, cataract, DM, DJD, DVT, HLD, GERD, TIA, HTN, neuropathy, insomnia, PE presents to the ED c/o upper back pain that started 1 week ago. Today started having chest pain. Pt also has a known UTI, prescribed nitrofurantoin. Pt was taking Tylenol for the back pain, and switched to Advil, minimal relief. Also reports nausea since last night. Pt had COVID in March 2020.

## 2021-08-16 NOTE — ED ADULT TRIAGE NOTE - CHIEF COMPLAINT QUOTE
Patient comes in with upper back pain and left sided rib pain that started 1 week ago, today patient started having chest pain. Patient denies sob.

## 2021-08-16 NOTE — ED STATDOCS - PHYSICAL EXAMINATION
GEN - uncomfortable appearing A+O x3   HEAD - NC/AT     EYES - EOMI, no conjunctival pallor, no scleral icterus  ENT -   mucous membranes  moist , no discharge      NECK - Neck supple  PULM - CTA b/l,  symmetric breath sounds  COR -  RRR, S1 S2, no murmurs  ABD - , ND, NT, soft, no guarding, no rebound, no masses    BACK - no CVA tenderness, nontender spine     EXTREMS - no edema, no deformity, warm and well perfused    SKIN - no rash or bruising      NEUROLOGIC - alert, sensation nl, motor 5/5 RUE/LUE/RLE/LLE

## 2021-08-16 NOTE — ED STATDOCS - PROGRESS NOTE DETAILS
amanda: pt reassessed, pain apparently msk - positional, TTP. Will dc w/ ortho follow up. Pt was re-evaluated at bedside, VSS, feeling better overall. Results were discussed with patient as well as return precautions and follow up plan with PCP and/or specialist. Time was taken to answer any questions that the patient had before providing them with discharge paperwork.

## 2021-08-16 NOTE — ED STATDOCS - NSFOLLOWUPINSTRUCTIONS_ED_ALL_ED_FT
Please follow up with your primary care provider for further concerns you may have regarding your general health. Attached you will find your results from today's visit. Continue taking your medications as prescribed and keep your upcoming medical appointments.    To control your pain at home, you should take Ibuprofen 400 mg along with Tylenol 650mg-1000mg every 6 to 8 hours. Limit your maximum daily Tylenol from all sources to 4000mg. Be aware that many other medications contain acetaminophen which is also known as Tylenol. Taking Tylenol and Ibuprofen together has been shown to be more effective at relieving pain than taking them separately. These are both over the counter medications that you can  at your local pharmacy without a prescription. You need to respect all of the warnings on the bottles. You shouldn’t take these medications for more than a week without following up with your doctor. Both medications come with certain risks and side effects that you need to discuss with your doctor, especially if you are taking them for a prolonged period.    You will be contacted for further follow up with an orthopedist.

## 2021-08-16 NOTE — ED STATDOCS - NS ED ROS FT
Gen: No fever, normal appetite  Eyes: No eye irritation or discharge  ENT: No ear pain, congestion, sore throat  Resp: No cough or trouble breathing  Cardiovascular: (+) chest pain. no palpitation  Gastroenteric: (+) nausea. no vomiting, diarrhea, constipation  :  No change in urine output; no dysuria  MS: (+) back pain  Skin: No rashes  Neuro: No headache; no abnormal movements  Remainder negative, except as per the HPI

## 2021-08-16 NOTE — ED STATDOCS - NSICDXPASTSURGICALHX_GEN_ALL_CORE_FT
PAST SURGICAL HISTORY:  [place] filter in place left    H/O tubal ligation     History of cystoscopy history  nephrolithiasis and recurrent UTI S/P ESWL 5/2016    History of loop recorder     Proliferative diabetic retinopathy of left eye s/p PPV/laser/silicone  5/31/16 and 11/29/16 OS    S/P cataract surgery b/l    Shoulder disorder left

## 2021-08-16 NOTE — ED STATDOCS - PATIENT PORTAL LINK FT
You can access the FollowMyHealth Patient Portal offered by [facility] by registering at the following website: [email or web address]. By joining [employer]’s FollowMyHealth portal, you will also be able to view your health information using other applications (apps) compatible with our system.

## 2021-08-16 NOTE — ED STATDOCS - CLINICAL SUMMARY MEDICAL DECISION MAKING FREE TEXT BOX
pt with chest discomfort, evaluated for similar symptoms 1 week ago. normal EKG. will check cardiac r/o ACS. will check screening dimer for PE. will check CXR for PNA. also recent UTI will repeat UA.

## 2021-08-20 ENCOUNTER — NON-APPOINTMENT (OUTPATIENT)
Age: 61
End: 2021-08-20

## 2021-08-23 ENCOUNTER — EMERGENCY (EMERGENCY)
Facility: HOSPITAL | Age: 61
LOS: 0 days | Discharge: HOME | End: 2021-08-23
Attending: EMERGENCY MEDICINE
Payer: MEDICAID

## 2021-08-23 VITALS
HEIGHT: 60 IN | SYSTOLIC BLOOD PRESSURE: 151 MMHG | HEART RATE: 84 BPM | TEMPERATURE: 98 F | DIASTOLIC BLOOD PRESSURE: 83 MMHG | OXYGEN SATURATION: 100 % | RESPIRATION RATE: 18 BRPM

## 2021-08-23 DIAGNOSIS — N39.0 URINARY TRACT INFECTION, SITE NOT SPECIFIED: ICD-10-CM

## 2021-08-23 DIAGNOSIS — E11.3592 TYPE 2 DIABETES MELLITUS WITH PROLIFERATIVE DIABETIC RETINOPATHY WITHOUT MACULAR EDEMA, LEFT EYE: Chronic | ICD-10-CM

## 2021-08-23 DIAGNOSIS — M25.9 JOINT DISORDER, UNSPECIFIED: Chronic | ICD-10-CM

## 2021-08-23 DIAGNOSIS — Z86.59 PERSONAL HISTORY OF OTHER MENTAL AND BEHAVIORAL DISORDERS: ICD-10-CM

## 2021-08-23 DIAGNOSIS — Z98.890 OTHER SPECIFIED POSTPROCEDURAL STATES: Chronic | ICD-10-CM

## 2021-08-23 DIAGNOSIS — Z79.01 LONG TERM (CURRENT) USE OF ANTICOAGULANTS: ICD-10-CM

## 2021-08-23 DIAGNOSIS — Z87.19 PERSONAL HISTORY OF OTHER DISEASES OF THE DIGESTIVE SYSTEM: ICD-10-CM

## 2021-08-23 DIAGNOSIS — E78.5 HYPERLIPIDEMIA, UNSPECIFIED: ICD-10-CM

## 2021-08-23 DIAGNOSIS — E78.00 PURE HYPERCHOLESTEROLEMIA, UNSPECIFIED: ICD-10-CM

## 2021-08-23 DIAGNOSIS — Z79.84 LONG TERM (CURRENT) USE OF ORAL HYPOGLYCEMIC DRUGS: ICD-10-CM

## 2021-08-23 DIAGNOSIS — Z98.41 CATARACT EXTRACTION STATUS, RIGHT EYE: ICD-10-CM

## 2021-08-23 DIAGNOSIS — Z79.02 LONG TERM (CURRENT) USE OF ANTITHROMBOTICS/ANTIPLATELETS: ICD-10-CM

## 2021-08-23 DIAGNOSIS — Z98.42 CATARACT EXTRACTION STATUS, LEFT EYE: ICD-10-CM

## 2021-08-23 DIAGNOSIS — Z88.0 ALLERGY STATUS TO PENICILLIN: ICD-10-CM

## 2021-08-23 DIAGNOSIS — Z86.711 PERSONAL HISTORY OF PULMONARY EMBOLISM: ICD-10-CM

## 2021-08-23 DIAGNOSIS — Z95.818 PRESENCE OF OTHER CARDIAC IMPLANTS AND GRAFTS: ICD-10-CM

## 2021-08-23 DIAGNOSIS — Z98.51 TUBAL LIGATION STATUS: Chronic | ICD-10-CM

## 2021-08-23 DIAGNOSIS — Z88.1 ALLERGY STATUS TO OTHER ANTIBIOTIC AGENTS STATUS: ICD-10-CM

## 2021-08-23 DIAGNOSIS — M54.9 DORSALGIA, UNSPECIFIED: ICD-10-CM

## 2021-08-23 DIAGNOSIS — K21.9 GASTRO-ESOPHAGEAL REFLUX DISEASE WITHOUT ESOPHAGITIS: ICD-10-CM

## 2021-08-23 DIAGNOSIS — Z79.82 LONG TERM (CURRENT) USE OF ASPIRIN: ICD-10-CM

## 2021-08-23 DIAGNOSIS — Z95.828 PRESENCE OF OTHER VASCULAR IMPLANTS AND GRAFTS: Chronic | ICD-10-CM

## 2021-08-23 DIAGNOSIS — Z86.73 PERSONAL HISTORY OF TRANSIENT ISCHEMIC ATTACK (TIA), AND CEREBRAL INFARCTION WITHOUT RESIDUAL DEFICITS: ICD-10-CM

## 2021-08-23 DIAGNOSIS — Z98.49 CATARACT EXTRACTION STATUS, UNSPECIFIED EYE: Chronic | ICD-10-CM

## 2021-08-23 DIAGNOSIS — E11.9 TYPE 2 DIABETES MELLITUS WITHOUT COMPLICATIONS: ICD-10-CM

## 2021-08-23 DIAGNOSIS — Z95.828 PRESENCE OF OTHER VASCULAR IMPLANTS AND GRAFTS: ICD-10-CM

## 2021-08-23 DIAGNOSIS — Z79.899 OTHER LONG TERM (CURRENT) DRUG THERAPY: ICD-10-CM

## 2021-08-23 DIAGNOSIS — I10 ESSENTIAL (PRIMARY) HYPERTENSION: ICD-10-CM

## 2021-08-23 DIAGNOSIS — Z98.51 TUBAL LIGATION STATUS: ICD-10-CM

## 2021-08-23 DIAGNOSIS — Z87.39 PERSONAL HISTORY OF OTHER DISEASES OF THE MUSCULOSKELETAL SYSTEM AND CONNECTIVE TISSUE: ICD-10-CM

## 2021-08-23 LAB
ALBUMIN SERPL ELPH-MCNC: 4.2 G/DL — SIGNIFICANT CHANGE UP (ref 3.3–5)
ALP SERPL-CCNC: 92 U/L — SIGNIFICANT CHANGE UP (ref 40–120)
ALT FLD-CCNC: 38 U/L — SIGNIFICANT CHANGE UP (ref 12–78)
ANION GAP SERPL CALC-SCNC: 6 MMOL/L — SIGNIFICANT CHANGE UP (ref 5–17)
APPEARANCE UR: CLEAR — SIGNIFICANT CHANGE UP
APTT BLD: 30 SEC — SIGNIFICANT CHANGE UP (ref 27.5–35.5)
AST SERPL-CCNC: 29 U/L — SIGNIFICANT CHANGE UP (ref 15–37)
BASOPHILS # BLD AUTO: 0.05 K/UL — SIGNIFICANT CHANGE UP (ref 0–0.2)
BASOPHILS NFR BLD AUTO: 0.5 % — SIGNIFICANT CHANGE UP (ref 0–2)
BILIRUB SERPL-MCNC: 0.4 MG/DL — SIGNIFICANT CHANGE UP (ref 0.2–1.2)
BILIRUB UR-MCNC: NEGATIVE — SIGNIFICANT CHANGE UP
BUN SERPL-MCNC: 9 MG/DL — SIGNIFICANT CHANGE UP (ref 7–23)
CALCIUM SERPL-MCNC: 9.5 MG/DL — SIGNIFICANT CHANGE UP (ref 8.5–10.1)
CHLORIDE SERPL-SCNC: 102 MMOL/L — SIGNIFICANT CHANGE UP (ref 96–108)
CO2 SERPL-SCNC: 26 MMOL/L — SIGNIFICANT CHANGE UP (ref 22–31)
COLOR SPEC: YELLOW — SIGNIFICANT CHANGE UP
CREAT SERPL-MCNC: 0.68 MG/DL — SIGNIFICANT CHANGE UP (ref 0.5–1.3)
DIFF PNL FLD: NEGATIVE — SIGNIFICANT CHANGE UP
EOSINOPHIL # BLD AUTO: 0.39 K/UL — SIGNIFICANT CHANGE UP (ref 0–0.5)
EOSINOPHIL NFR BLD AUTO: 3.9 % — SIGNIFICANT CHANGE UP (ref 0–6)
GLUCOSE SERPL-MCNC: 125 MG/DL — HIGH (ref 70–99)
GLUCOSE UR QL: NEGATIVE MG/DL — SIGNIFICANT CHANGE UP
HCT VFR BLD CALC: 39.7 % — SIGNIFICANT CHANGE UP (ref 34.5–45)
HGB BLD-MCNC: 13.3 G/DL — SIGNIFICANT CHANGE UP (ref 11.5–15.5)
IMM GRANULOCYTES NFR BLD AUTO: 0.3 % — SIGNIFICANT CHANGE UP (ref 0–1.5)
INR BLD: 0.99 RATIO — SIGNIFICANT CHANGE UP (ref 0.88–1.16)
KETONES UR-MCNC: NEGATIVE — SIGNIFICANT CHANGE UP
LEUKOCYTE ESTERASE UR-ACNC: ABNORMAL
LYMPHOCYTES # BLD AUTO: 5.26 K/UL — HIGH (ref 1–3.3)
LYMPHOCYTES # BLD AUTO: 52.4 % — HIGH (ref 13–44)
MCHC RBC-ENTMCNC: 29.8 PG — SIGNIFICANT CHANGE UP (ref 27–34)
MCHC RBC-ENTMCNC: 33.5 GM/DL — SIGNIFICANT CHANGE UP (ref 32–36)
MCV RBC AUTO: 89 FL — SIGNIFICANT CHANGE UP (ref 80–100)
MONOCYTES # BLD AUTO: 0.68 K/UL — SIGNIFICANT CHANGE UP (ref 0–0.9)
MONOCYTES NFR BLD AUTO: 6.8 % — SIGNIFICANT CHANGE UP (ref 2–14)
NEUTROPHILS # BLD AUTO: 3.63 K/UL — SIGNIFICANT CHANGE UP (ref 1.8–7.4)
NEUTROPHILS NFR BLD AUTO: 36.1 % — LOW (ref 43–77)
NITRITE UR-MCNC: NEGATIVE — SIGNIFICANT CHANGE UP
PH UR: 7 — SIGNIFICANT CHANGE UP (ref 5–8)
PLATELET # BLD AUTO: 340 K/UL — SIGNIFICANT CHANGE UP (ref 150–400)
POTASSIUM SERPL-MCNC: 4.1 MMOL/L — SIGNIFICANT CHANGE UP (ref 3.5–5.3)
POTASSIUM SERPL-SCNC: 4.1 MMOL/L — SIGNIFICANT CHANGE UP (ref 3.5–5.3)
PROT SERPL-MCNC: 7.9 GM/DL — SIGNIFICANT CHANGE UP (ref 6–8.3)
PROT UR-MCNC: NEGATIVE MG/DL — SIGNIFICANT CHANGE UP
PROTHROM AB SERPL-ACNC: 11.5 SEC — SIGNIFICANT CHANGE UP (ref 10.6–13.6)
RBC # BLD: 4.46 M/UL — SIGNIFICANT CHANGE UP (ref 3.8–5.2)
RBC # FLD: 13.3 % — SIGNIFICANT CHANGE UP (ref 10.3–14.5)
SODIUM SERPL-SCNC: 134 MMOL/L — LOW (ref 135–145)
SP GR SPEC: 1 — LOW (ref 1.01–1.02)
TROPONIN I SERPL-MCNC: <0.015 NG/ML — SIGNIFICANT CHANGE UP (ref 0.01–0.04)
UROBILINOGEN FLD QL: NEGATIVE MG/DL — SIGNIFICANT CHANGE UP
WBC # BLD: 10.04 K/UL — SIGNIFICANT CHANGE UP (ref 3.8–10.5)
WBC # FLD AUTO: 10.04 K/UL — SIGNIFICANT CHANGE UP (ref 3.8–10.5)

## 2021-08-23 PROCEDURE — 74177 CT ABD & PELVIS W/CONTRAST: CPT | Mod: 26,MF

## 2021-08-23 PROCEDURE — 84484 ASSAY OF TROPONIN QUANT: CPT

## 2021-08-23 PROCEDURE — 85730 THROMBOPLASTIN TIME PARTIAL: CPT

## 2021-08-23 PROCEDURE — 71275 CT ANGIOGRAPHY CHEST: CPT | Mod: 26,ME

## 2021-08-23 PROCEDURE — 93010 ELECTROCARDIOGRAM REPORT: CPT

## 2021-08-23 PROCEDURE — 80053 COMPREHEN METABOLIC PANEL: CPT

## 2021-08-23 PROCEDURE — 96374 THER/PROPH/DIAG INJ IV PUSH: CPT

## 2021-08-23 PROCEDURE — 99285 EMERGENCY DEPT VISIT HI MDM: CPT

## 2021-08-23 PROCEDURE — 93005 ELECTROCARDIOGRAM TRACING: CPT

## 2021-08-23 PROCEDURE — 87086 URINE CULTURE/COLONY COUNT: CPT

## 2021-08-23 PROCEDURE — G1004: CPT

## 2021-08-23 PROCEDURE — 71275 CT ANGIOGRAPHY CHEST: CPT

## 2021-08-23 PROCEDURE — 81001 URINALYSIS AUTO W/SCOPE: CPT

## 2021-08-23 PROCEDURE — 85025 COMPLETE CBC W/AUTO DIFF WBC: CPT

## 2021-08-23 PROCEDURE — 36415 COLL VENOUS BLD VENIPUNCTURE: CPT

## 2021-08-23 PROCEDURE — 74177 CT ABD & PELVIS W/CONTRAST: CPT

## 2021-08-23 PROCEDURE — 85610 PROTHROMBIN TIME: CPT

## 2021-08-23 PROCEDURE — 99284 EMERGENCY DEPT VISIT MOD MDM: CPT | Mod: 25

## 2021-08-23 RX ORDER — DIAZEPAM 5 MG
5 TABLET ORAL ONCE
Refills: 0 | Status: DISCONTINUED | OUTPATIENT
Start: 2021-08-23 | End: 2021-08-23

## 2021-08-23 RX ORDER — LIDOCAINE 4 G/100G
1 CREAM TOPICAL ONCE
Refills: 0 | Status: COMPLETED | OUTPATIENT
Start: 2021-08-23 | End: 2021-08-23

## 2021-08-23 RX ORDER — KETOROLAC TROMETHAMINE 30 MG/ML
30 SYRINGE (ML) INJECTION ONCE
Refills: 0 | Status: DISCONTINUED | OUTPATIENT
Start: 2021-08-23 | End: 2021-08-23

## 2021-08-23 RX ORDER — CYCLOBENZAPRINE HYDROCHLORIDE 10 MG/1
1 TABLET, FILM COATED ORAL
Qty: 12 | Refills: 0
Start: 2021-08-23 | End: 2021-08-26

## 2021-08-23 RX ADMIN — Medication 30 MILLIGRAM(S): at 08:46

## 2021-08-23 RX ADMIN — Medication 5 MILLIGRAM(S): at 06:43

## 2021-08-23 RX ADMIN — LIDOCAINE 1 PATCH: 4 CREAM TOPICAL at 08:50

## 2021-08-23 NOTE — ED ADULT NURSE NOTE - CHIEF COMPLAINT QUOTE
worsening back pain x 1 week. diagnosed with UTI, was prescribed augmentin 875/125mg. complains of all over back pain. denies hematuria.  brought to ED by daughter Allyson [phone].

## 2021-08-23 NOTE — ED PROVIDER NOTE - NSFOLLOWUPINSTRUCTIONS_ED_ALL_ED_FT
Distensión lumbar    LO QUE NECESITA SABER:    La distensión lumbar es gilma lesión en los músculos o tendones de la parte inferior de la espalda. Los tendones son los tejidos leslie que conectan a los músculos con los huesos. La parte inferior de la espalda sostiene la mayor parte de antoine peso corporal y facilita antoine habilidad de moverse, torcerse y doblarse.    INSTRUCCIONES SOBRE EL TISH HOSPITALARIA:    Busque atención médica de inmediato si:  •Usted oye o siente un estallido en la parte inferior de la espalda.      •Usted tiene mayor inflamación o dolor en la parte inferior de antoine espalda.      •Usted tiene dificultad para  las piernas.      •Sandra piernas están entumecidas.      Comuníquese con antoine médico si:  •Tiene fiebre.      •El dolor no desaparece, incluso después del tratamiento.      •Usted tiene preguntas o inquietudes acerca de antoine condición o cuidado.      Medicamentos:Los siguientes medicamentos pueden  ser recetados por antoine médico:   •Acetaminofénalivia el dolor y baja la fiebre. Está disponible sin receta médica. Pregunte la cantidad y la frecuencia con que debe tomarlos. Siga las indicaciones. El acetaminofén puede causar daño en el hígado cuando no se lizette de forma correcta.      •Los LORENZO,mahsa el ibuprofeno, ayudan a disminuir la inflamación, el dolor y la fiebre. Martin medicamento está disponible con o sin gilma receta médica. Los LORENZO pueden causar sangrado estomacal o problemas renales en ciertas personas. Si usted lizette un medicamento anticoagulante, siempre pregúntele a antoine médico si los LORENZO son seguros para usted. Siempre adia la etiqueta de martin medicamento y siga las instrucciones.      •Relajantes muscularesayudan a reducir dolor y espasmos musculares.      •Puede administrarsepodrían administrarse. Pregunte cómo michael estos medicamentos de gilma forma phillips.      •[place] sandra medicamentos mahsa se le haya indicado.Consulte con antoine médico si usted berto que antoine medicamento no le está ayudando o si presenta efectos secundarios. Infórmele si es alérgico a cualquier medicamento. Mantenga gilma lista actualizada de los medicamentos, las vitaminas y los productos herbales que lizette. Incluya los siguientes datos de los medicamentos: cantidad, frecuencia y motivo de administración. Traiga con usted la lista o los envases de las píldoras a sandra citas de seguimiento. Lleve la lista de los medicamentos con usted en tonja de gilma emergencia.      Cuidados personales:  •El descansosegún las indicaciones. Es probable que necesite descansar en cama nima un tiempo después de lesionarse. No levante objetos pesados.      •Aplique hieloen la espalda de 15 a 20 minutos cada hora o mahsa se le indique. Use gilma compresa de hielo o ponga hielo triturado en gilma bolsa de plástico. Cúbrala con gilma toalla. El hielo ayuda a evitar daño al tejido y a disminuir la inflamación y el dolor.      •La aplicación de caloren la parte inferior de la espalda de 20 a 30 minutos cada 2 horas nima los días que le indiquen. El calor ayuda a disminuir el dolor y los espasmos musculares.      •Comience lentamente a aumentar antoine nivel de actividadconforme disminuya el dolor o mahsa se lo indiquen.      Evite otra distensión lumbar:  •Use movimientos corporales correctos.?Flexione la cadera y las rodillas cuando vaya a levantar un objeto. No doble la cintura. Utilice los músculos de las piernas mientras levanta antoine carga. No use antoine espalda. Mantenga el objeto cerca de antoine pecho mientras lo levanta. No se tuerza, ni levante cualquier cosa por encima de antoine cintura.      ?Cambie antoine posición frecuentemente cuando pase mucho tiempo de pie. Descanse un pie sobre gilma caja pequeña o un reposapiés e intercambie con el otro pie frecuentemente.      ?No permanezca sentado por lapsos de tiempo prolongados. Cuando sea necesario hacerlo, siéntese en gilma silla de respaldo recto con los pies apoyados en el suelo.      ?Nunca alcance, jale ni empuje mientras se encuentra sentando.      •Entre en calor antes de hacer ejercicio.Chavo ejercicios que fortalezcan sandra músculos de la espalda. Pregunte a antoine médico acerca del mejor plan de ejercicio para usted.      •Mantenga un peso saludable.Consulte con antoine médico cuánto debería pesar. Pida que le ayude a crear un plan para bajar de peso si usted tiene sobrepeso.      Acuda a la consulta de control con antoine médico según las indicaciones:Anote sandra preguntas para que se acuerde de hacerlas nima sandra visitas.       © Copyright [employer] 2021           back to top                          © Copyright [employer] 2021

## 2021-08-23 NOTE — ED ADULT NURSE NOTE - NSIMPLEMENTINTERV_GEN_ALL_ED
Implemented All Universal Safety Interventions:  [place] to call system. Call bell, personal items and telephone within reach. Instruct patient to call for assistance. Room bathroom lighting operational. Non-slip footwear when patient is off stretcher. Physically safe environment: no spills, clutter or unnecessary equipment. Stretcher in lowest position, wheels locked, appropriate side rails in place.

## 2021-08-23 NOTE — ED PROVIDER NOTE - PHYSICAL EXAMINATION
GEN - NAD; well appearing; A+O x3   HEAD - NC/AT     EYES - EOMI, no conjunctival pallor, no scleral icterus  ENT -   mucous membranes  moist , no discharge      NECK - Neck supple  PULM - CTA b/l,  symmetric breath sounds  COR -  RRR, S1 S2, no murmurs, TTP anterior Chest wall and inferior aspect of scapula.   ABD - , ND, NT, soft, no guarding, no rebound, no masses    BACK - no CVA tenderness, nontender spine     EXTREMS - no edema, no deformity, warm and well perfused    SKIN - no rash or bruising      NEUROLOGIC - alert, sensation nl, motor 5/5 RUE/LUE/RLE/LLE

## 2021-08-23 NOTE — ED PROVIDER NOTE - CLINICAL SUMMARY MEDICAL DECISION MAKING FREE TEXT BOX
pt with thoracic back pain, hx of clots, will CTA r/o pe, Will check troponin, ekg, monitor on telemetry, rule out acute coronary syndrome.  also recent UTI, possible high pyelo will check UA, CTAP

## 2021-08-23 NOTE — ED PROVIDER NOTE - NS ED ROS FT
Gen: No fever, normal appetite  Eyes: No eye irritation or discharge  ENT: No ear pain, congestion, sore throat  Resp: No cough or trouble breathing  Cardiovascular: + chest pain, back pain   Gastroenteric: No nausea/vomiting, diarrhea, constipation  :  No change in urine output; no dysuria  MS: back pain   Skin: No rashes  Neuro: No headache; no abnormal movements  Remainder negative, except as per the HPI

## 2021-08-23 NOTE — ED ADULT TRIAGE NOTE - CHIEF COMPLAINT QUOTE
worsening back pain x 1 week. diagnosed with UTI, was prescribed augmentin 875/125mg. complains of all over back pain. denies hematuria. worsening back pain x 1 week. diagnosed with UTI, was prescribed augmentin 875/125mg. complains of all over back pain. denies hematuria.  brought to ED by daughter Allyson [phone].

## 2021-08-23 NOTE — ED ADULT NURSE NOTE - OBJECTIVE STATEMENT
worsening upper back pain x 1 week radiating to chest when taking deep breath, radiating to left arm. pt diagnosed with UTI, was prescribed augmentin 875/125mg, taking it for 5 days. pt continues to have UTI symptoms, denies hematuria. hx loop recorder. pt states her doctor thinks it may be her loop recorder causing the pain.

## 2021-08-23 NOTE — ED PROVIDER NOTE - OBJECTIVE STATEMENT
60yo f pmh DM, DVT, HLD, HTN, p/w left upper back pain. symptoms x 3 weeks, taking tylenol w/o relief. hx of DVT on daily baby ASA. pain worse w/ movement, radiates to left arm. no cough, fevers, chills. no new lower extremity pain. recently treated for UTI.

## 2021-08-23 NOTE — ED PROVIDER NOTE - NSICDXPASTSURGICALHX_GEN_ALL_CORE_FT
PAST SURGICAL HISTORY:  Rockwell filter in place left    H/O tubal ligation     History of cystoscopy history  nephrolithiasis and recurrent UTI S/P ESWL 5/2016    History of loop recorder     Proliferative diabetic retinopathy of left eye s/p PPV/laser/silicone  5/31/16 and 11/29/16 OS    S/P cataract surgery b/l    Shoulder disorder left

## 2021-08-23 NOTE — ED PROVIDER NOTE - PATIENT PORTAL LINK FT
You can access the FollowMyHealth Patient Portal offered by [facility] by registering at the following website: [email or web address]. By joining [employer]’s FollowMyHealth portal, you will also be able to view your health information using other applications (apps) compatible with our system.

## 2021-08-24 LAB
CULTURE RESULTS: NO GROWTH — SIGNIFICANT CHANGE UP
SPECIMEN SOURCE: SIGNIFICANT CHANGE UP

## 2021-08-26 ENCOUNTER — APPOINTMENT (OUTPATIENT)
Dept: RHEUMATOLOGY | Facility: CLINIC | Age: 61
End: 2021-08-26
Payer: MEDICAID

## 2021-08-26 VITALS
HEART RATE: 91 BPM | BODY MASS INDEX: 30.43 KG/M2 | SYSTOLIC BLOOD PRESSURE: 143 MMHG | HEIGHT: 60 IN | TEMPERATURE: 96 F | DIASTOLIC BLOOD PRESSURE: 94 MMHG | WEIGHT: 155 LBS | OXYGEN SATURATION: 98 %

## 2021-08-26 DIAGNOSIS — M75.50 BURSITIS OF UNSPECIFIED SHOULDER: ICD-10-CM

## 2021-08-26 DIAGNOSIS — M25.512 PAIN IN LEFT SHOULDER: ICD-10-CM

## 2021-08-26 DIAGNOSIS — M62.838 OTHER MUSCLE SPASM: ICD-10-CM

## 2021-08-26 PROCEDURE — 20610 DRAIN/INJ JOINT/BURSA W/O US: CPT | Mod: LT

## 2021-08-26 PROCEDURE — 99214 OFFICE O/P EST MOD 30 MIN: CPT | Mod: 25

## 2021-08-26 RX ORDER — METHYLPRED ACET/NACL,ISO-OS/PF 40 MG/ML
40 VIAL (ML) INJECTION
Qty: 1 | Refills: 0 | Status: COMPLETED | OUTPATIENT
Start: 2021-08-26

## 2021-08-26 RX ADMIN — METHYLPREDNISOLONE ACETATE 40 MG/ML: 40 INJECTION, SUSPENSION INTRA-ARTICULAR; INTRALESIONAL; INTRAMUSCULAR; SOFT TISSUE at 00:00

## 2021-08-26 NOTE — ASSESSMENT
[FreeTextEntry1] : 60-year-old female, here for follow up w/ joint aches throughout the hands/MCPs/ PIPs/ bilaterally w/ synovitis w/ xray of b/l hands reading small well marginated erosion along proximal ulnar articular margin of 3rd DIP joint w/ Seronegative arthritis w/ raised ESR w/ concern for reactive arthritis given hx of recurrent UTIs; versus Seronegative RA in the differential w/ hx of dry eyes on drops w/ her Optho.\par \par Seronegative Arthritis: \par -reports she has been taking sulfasalazine 500mg 2 tabs AM and 2 tab PM & forgot to increase from last visit and notes pain in b/l MCPs/PIPs and wrists w/o effusion  \par -reviewed labs 8/23/2021 [facility] w/ CBC ok; CMP ok\par -had hx of recurrent UTIs \par -discussed r/b/s of increasing Sulfasalazine 500mg to 3 tabs AM and 3 tab PM until f/u w/ G6PD NL w/ pt agreeable and prescription sent as below\par -labs as below to monitor \par -xray b/l hands 2/3/2020 reading small well marginated erosion along proximal ulnar articular margin of 3rd DIP joint\par -dry eyes: on drops w/ her Optho she reports\par -dry mouth: biotene mouthwash or toothpaste PRN \par -chest xray 8/16/2021 normal\par \par Lt shoulder pain/ bursitis: offered Depomedrol 40mg injection today and patient agreeable.\par -Has full ROM in b/l shoulders, no pelvic/girdle stiffness and able to stand up without using her hands, no temporal pain/unremitting headaches, no vision changes, no jaw pain.\par \par Upper trapezius tightness/spasms: -discussed r/b/s of cyclobenzaprine PRN w/ pt agreeable and prescription sent as below\par \par 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 and + [id number] IgM >150 high titer x2; LAC negative 6/15/2020\par -on ASA 81mg daily\par -referred to Heme to discuss if needs AC and need to continue ASA; forgot to go from last visit and referral given again today \par -GUILLERMINA w/ IF neg w/o clinical symptoms of SLE at this time & monitoring w/ labs as below to be complete\par \par LBP: intermittent \par -xray LS spine 2/3/2020 w/ DDD; SI normal \par -Motrin PRN w/ food needed sparingly helps\par \par Knee OA: xray b/l knees 2/3/2020 read as OA chagnes Rt knee\par -discussed she can consider steroid injection as needed \par \par Intermittent numbness/tingling: intermittent in hands and feet likely 2/2 to DM on insulin.\par -metabolic labs folate, B12, TSH normal for it 1/2020\par -states she takes gabapentin BID \par -knows if persistent can consider EMG w/ Neuro\par \par Osteoporosis: on Dexa 2/3/2020 w/ worse t-score -3.1 at spine\par -postmenopausal, no fractures; FH of it in sister\par -defers Fosamax bc states she has GERD and worries about compliance w/ bisphosphate \par -reviewed labs 10/13/2020 w/ Nl Ca=9.5\par -on prolia and states going to dentist soon and might need extraction so wants to hold off on it \par -discussed to continue Ca+vitD supplementation \par -encouraged weight bearing exercises as tolerated.\par \par -educated on symptoms to monitor for in detail and alert us if any concerns.\par -knows to stay up to date on health maintenance w/ PCP; encourage compliance \par -f/u in 6-8 weeks w/ labs or sooner as needed \par \par

## 2021-08-26 NOTE — REVIEW OF SYSTEMS
[As Noted in HPI] : as noted in HPI [Fever] : no fever [Chills] : no chills [Eye Pain] : no eye pain [Red Eyes] : eyes not red [Chest Pain] : no chest pain [Nosebleeds] : no nosebleeds [Shortness Of Breath] : no shortness of breath [Abdominal Pain] : no abdominal pain [Dysuria] : no dysuria [Confused] : no confusion [Suicidal] : not suicidal [Proptosis] : no proptosis [Easy Bleeding] : no tendency for easy bleeding

## 2021-08-26 NOTE — HISTORY OF PRESENT ILLNESS
[FreeTextEntry1] : 61-year-old female here for follow up w/ her grand-daughter. \par She is here for f/u of her seronegative erosive arthritis and Osteoporosis.\par \par States she has been taking Sulfasalazine 500mg 2 tabs Am and 2 tab PM and forgot to increase it as advised last visit. \par States she is noting some achy pain in her b/l hands/MCPs/PIPs and wrists that is improved but not resolved on it yet.\par States she is noting achy pain in her left shoulder w/ rotation. Denies any injury or trauma. \par Has full ROM in b/l shoulders, no pelvic/girdle stiffness and able to stand up without using her hands, no temporal pain/unremitting headaches, no vision changes, no jaw pain.\par States she notes some tightness in her left trapezius muscle that she was told at [facility].\par States she notices some tingling in her arms and feet with history of diabetes on insulin and is on gabapentin BID.\par Denies any fever/chills, no rashes, no ulcers, + dry eyes w/ her Optho, Dr. Kane Kaplan; + dry mouth at times, no raynaud's, no GI/ symptoms at this time.\par \par Reports hx of 1 DVT around 2014 w/ PE and no other blood clots; no stroke; 5 healthy pregnancies; no miscarriages. States taking ASA 81mg daily and needs referral for Heme/Onc again for high + beta glycoprotein.\par \par She is postmenopausal, no prior fractures and has FH of osteoporosis in her sister. States she does have GERD so defers PO bisphosphonates and doing well on Prolia. Missed last appt for prolia and states she might get extraction on upcoming appt w/ dentist so defers getting it today.\par \par

## 2021-08-26 NOTE — REASON FOR VISIT
[Follow-Up: _____] : a [unfilled] follow-up visit [FreeTextEntry1] : Seronegative erosive arthritis; labs/meds; Osteoporosis; Lt shoulder pain \par

## 2021-08-26 NOTE — PHYSICAL EXAM
[General Appearance - Alert] : alert [General Appearance - In No Acute Distress] : in no acute distress [Sclera] : the sclera and conjunctiva were normal [Extraocular Movements] : extraocular movements were intact [Outer Ear] : the ears and nose were normal in appearance [Neck Appearance] : the appearance of the neck was normal [Respiration, Rhythm And Depth] : normal respiratory rhythm and effort [Heart Rate And Rhythm] : heart rate was normal and rhythm regular [Heart Sounds] : normal S1 and S2 [Abdomen Soft] : soft [Abdomen Tenderness] : non-tender [Cervical Lymph Nodes Enlarged Anterior Bilaterally] : anterior cervical [Supraclavicular Lymph Nodes Enlarged Bilaterally] : supraclavicular [No CVA Tenderness] : no ~M costovertebral angle tenderness [Motor Tone] : muscle strength and tone were normal [] : no rash [No Focal Deficits] : no focal deficits [Impaired Insight] : insight and judgment were intact [Mood] : the mood was normal [FreeTextEntry1] : Rt hand 2nd and 5th MCP tenderness; Rt hand 2nd and 4th PIP tenderness; LT hand 2-5 MCP tenderenss; LT and 2-4 PIP tenderenss; LT shoulder tenderness w/ good ROM

## 2021-08-30 ENCOUNTER — APPOINTMENT (OUTPATIENT)
Dept: ORTHOPEDIC SURGERY | Facility: CLINIC | Age: 61
End: 2021-08-30
Payer: MEDICAID

## 2021-08-30 DIAGNOSIS — M54.12 RADICULOPATHY, CERVICAL REGION: ICD-10-CM

## 2021-08-30 PROCEDURE — 99204 OFFICE O/P NEW MOD 45 MIN: CPT

## 2021-08-30 NOTE — ASSESSMENT
[FreeTextEntry1] : 61-year-old female with 4 weeks of left neck periscapular and upper extremity pain the etiology of her symptoms is likely secondary to cervical radiculopathy and periscapular strain. I recommended a prednisone taper and physical therapy. Follow up in 3-4 weeks for reevaluation.

## 2021-08-30 NOTE — HISTORY OF PRESENT ILLNESS
[FreeTextEntry1] : 61 year female with history of seronegative erosive arthritis and osteoporosis presents for evaluation of Left shoulder pain present for about 4 weeks. She denies any acute injury or inciting event. She localizes the pain to the superior aspect of the shoulder with radiation into the left side of her neck. She reports radiation down the left upper extremity to the hand as well. She reports associated intermittent paresthesias. she is currently on gabapentin b.i.d. due to history of peripheral neuropathy. She notes her pain is worsened by activity. She admits to pain at rest as well. She has been seen by her rheumatologist and recently received a steroid injection about 4 days ago with minimal benefit. she reports since onset of her symptoms she has been seen in the emergency room due to severe pain.

## 2021-08-30 NOTE — PHYSICAL EXAM
[Normal] : Oriented to person, place, and time, insight and judgement were intact and the affect was normal [de-identified] : Left shoulder exam\par \par Inspection: No malalignment, No defects\par Skin: No masses, No lesions\par Neck: Positive Spurlings, ROM limited secondary to  pain\par ROM: Active range of motion of the shoulders intact bilaterally Painful arc ROM: None\par Tenderness:tenderness to palpation of the periscapular musculature; No bicipital tenderness, no tenderness to the greater tuberosity/RTC insertion, no anterior shoulder/lesser tuberosity tenderness\par Strength: 5/5 ER, 5/5 IR in adduction, 5/5 supraspinatus testing\par AC Joint: No ttp, no pain with cross arm testing\par Biceps: Speed negative,\par Impingement test: Mild positive [place]\par Stability: Negative apprehension, negative anterior/posterior load and shift\par Vasc: 2+ radial pulse\par Neuro: AIN, PIN, Ulnar nerve in tact to motor\par Sensation: Intact to light touch throughout\par \par

## 2021-09-08 ENCOUNTER — APPOINTMENT (OUTPATIENT)
Dept: UROLOGY | Facility: CLINIC | Age: 61
End: 2021-09-08
Payer: MEDICAID

## 2021-09-08 DIAGNOSIS — N20.0 CALCULUS OF KIDNEY: ICD-10-CM

## 2021-09-08 DIAGNOSIS — R30.0 DYSURIA: ICD-10-CM

## 2021-09-08 PROCEDURE — 99213 OFFICE O/P EST LOW 20 MIN: CPT

## 2021-09-08 RX ORDER — NITROFURANTOIN MACROCRYSTALS 100 MG/1
100 CAPSULE ORAL 3 TIMES DAILY
Qty: 15 | Refills: 0 | Status: ACTIVE | COMMUNITY
Start: 2021-09-08 | End: 1900-01-01

## 2021-09-08 RX ORDER — FLUCONAZOLE 150 MG/1
150 TABLET ORAL
Qty: 1 | Refills: 0 | Status: ACTIVE | COMMUNITY
Start: 2021-09-08 | End: 1900-01-01

## 2021-09-08 NOTE — HISTORY OF PRESENT ILLNESS
[FreeTextEntry1] : This patient presents with a history of recurrent lower tract infections and pelvic discomfort when using the bathroom.

## 2021-09-08 NOTE — END OF VISIT
[FreeTextEntry3] : A CAT scan is ordered and she will be treated with Macrodantin, metronidazole, and Diflucan.  She will follow-up with urodynamics and cystoscopy

## 2021-09-08 NOTE — REVIEW OF SYSTEMS
[Feeling Tired] : feeling tired [Eyesight Problems] : eyesight problems [Dry Eyes] : dryness of the eyes [Sore Throat] : sore throat [Chest Pain] : chest pain [Abdominal Pain] : abdominal pain [Vomiting] : vomiting [Constipation] : constipation [Heartburn] : heartburn [Loss of interest] : loss of interest in sexual activity [Joint Pain] : joint pain [Dizziness] : dizziness [Limb Weakness] : limb weakness [Difficulty Walking] : difficulty walking [Hot Flashes] : hot flashes [Muscle Weakness] : muscle weakness [see HPI] : see HPI [Negative] : Endocrine

## 2021-09-09 ENCOUNTER — APPOINTMENT (OUTPATIENT)
Dept: RHEUMATOLOGY | Facility: CLINIC | Age: 61
End: 2021-09-09

## 2021-09-09 ENCOUNTER — NON-APPOINTMENT (OUTPATIENT)
Age: 61
End: 2021-09-09

## 2021-09-11 LAB
APPEARANCE: CLEAR
BACTERIA UR CULT: ABNORMAL
BACTERIA: ABNORMAL
BILIRUBIN URINE: NEGATIVE
BLOOD URINE: NEGATIVE
COLOR: NORMAL
GLUCOSE QUALITATIVE U: NORMAL
HYALINE CASTS: 0 /LPF
KETONES URINE: NEGATIVE
LEUKOCYTE ESTERASE URINE: ABNORMAL
MICROSCOPIC-UA: NORMAL
NITRITE URINE: NEGATIVE
PH URINE: 8
PROTEIN URINE: NEGATIVE
RED BLOOD CELLS URINE: 0 /HPF
SPECIFIC GRAVITY URINE: 1
SQUAMOUS EPITHELIAL CELLS: 2 /HPF
URINE CYTOLOGY: NORMAL
UROBILINOGEN URINE: NORMAL
WHITE BLOOD CELLS URINE: 1 /HPF

## 2021-09-20 ENCOUNTER — APPOINTMENT (OUTPATIENT)
Dept: CT IMAGING | Facility: CLINIC | Age: 61
End: 2021-09-20

## 2021-09-20 ENCOUNTER — APPOINTMENT (OUTPATIENT)
Dept: CARDIOLOGY | Facility: CLINIC | Age: 61
End: 2021-09-20
Payer: MEDICAID

## 2021-09-20 ENCOUNTER — APPOINTMENT (OUTPATIENT)
Dept: UROLOGY | Facility: CLINIC | Age: 61
End: 2021-09-20
Payer: MEDICAID

## 2021-09-20 ENCOUNTER — NON-APPOINTMENT (OUTPATIENT)
Age: 61
End: 2021-09-20

## 2021-09-20 ENCOUNTER — OUTPATIENT (OUTPATIENT)
Dept: OUTPATIENT SERVICES | Facility: HOSPITAL | Age: 61
LOS: 1 days | End: 2021-09-20

## 2021-09-20 VITALS
TEMPERATURE: 98 F | WEIGHT: 155 LBS | OXYGEN SATURATION: 98 % | HEART RATE: 107 BPM | BODY MASS INDEX: 30.43 KG/M2 | HEIGHT: 60 IN | SYSTOLIC BLOOD PRESSURE: 135 MMHG | DIASTOLIC BLOOD PRESSURE: 85 MMHG

## 2021-09-20 DIAGNOSIS — Z00.8 ENCOUNTER FOR OTHER GENERAL EXAMINATION: ICD-10-CM

## 2021-09-20 DIAGNOSIS — N34.3 URETHRAL SYNDROME, UNSPECIFIED: ICD-10-CM

## 2021-09-20 DIAGNOSIS — R07.9 CHEST PAIN, UNSPECIFIED: ICD-10-CM

## 2021-09-20 DIAGNOSIS — Z95.828 PRESENCE OF OTHER VASCULAR IMPLANTS AND GRAFTS: Chronic | ICD-10-CM

## 2021-09-20 DIAGNOSIS — Z98.890 OTHER SPECIFIED POSTPROCEDURAL STATES: Chronic | ICD-10-CM

## 2021-09-20 DIAGNOSIS — M25.9 JOINT DISORDER, UNSPECIFIED: Chronic | ICD-10-CM

## 2021-09-20 DIAGNOSIS — E11.3592 TYPE 2 DIABETES MELLITUS WITH PROLIFERATIVE DIABETIC RETINOPATHY WITHOUT MACULAR EDEMA, LEFT EYE: Chronic | ICD-10-CM

## 2021-09-20 DIAGNOSIS — Z98.51 TUBAL LIGATION STATUS: Chronic | ICD-10-CM

## 2021-09-20 DIAGNOSIS — Z95.818 PRESENCE OF OTHER CARDIAC IMPLANTS AND GRAFTS: ICD-10-CM

## 2021-09-20 DIAGNOSIS — Z98.49 CATARACT EXTRACTION STATUS, UNSPECIFIED EYE: Chronic | ICD-10-CM

## 2021-09-20 PROCEDURE — 52285 CYSTOSCOPY AND TREATMENT: CPT

## 2021-09-20 PROCEDURE — 99204 OFFICE O/P NEW MOD 45 MIN: CPT

## 2021-09-20 PROCEDURE — 93000 ELECTROCARDIOGRAM COMPLETE: CPT

## 2021-09-20 RX ORDER — INSULIN LISPRO 100 [IU]/ML
100 INJECTION, SOLUTION INTRAVENOUS; SUBCUTANEOUS
Refills: 0 | Status: ACTIVE | COMMUNITY

## 2021-09-20 NOTE — HISTORY OF PRESENT ILLNESS
[FreeTextEntry1] : Yokasta is in attendance (Daughter) provided translation\par \par CHEST DISCOMFORT\par On and off, 6 mo\par Left sided\par Mild\par Nonexertional. nonradiating\par lasting seconds\par denied modifying factors\par denied associated signs and symptoms\par denied dyspnea, orthopnea, pnd, edema, palpitation, recurrence of syncope near, syncope\par \par \par FUNCTIONAL CAPACITY\par Reports <4.0 METS\par \par FAMILY HISTORY\par Father  of MI at age 73\par \par \par CHRONIC, STABLE CONDITIONS\par DM\par Lower extremity  DVT and PEn few yrs ago\par CArotid artery atherosclerosis\par s/p ILR She had an episode of syncope 2016\par \par CARDIAC TESTING

## 2021-09-20 NOTE — CARDIOLOGY SUMMARY
[de-identified] : 9/20/2021\par ST at 109 bpm\par Voltage criteria for LVH  (R(I)+S(III) exceeds 2.50 mV)  -Voltage criteria w/o ST/T abnormality may be normal. \par ABNORMAL RHYTHM\par

## 2021-09-22 ENCOUNTER — APPOINTMENT (OUTPATIENT)
Dept: RHEUMATOLOGY | Facility: CLINIC | Age: 61
End: 2021-09-22
Payer: MEDICAID

## 2021-09-22 ENCOUNTER — LABORATORY RESULT (OUTPATIENT)
Age: 61
End: 2021-09-22

## 2021-09-22 VITALS
HEIGHT: 60 IN | DIASTOLIC BLOOD PRESSURE: 82 MMHG | TEMPERATURE: 96.2 F | BODY MASS INDEX: 30.43 KG/M2 | SYSTOLIC BLOOD PRESSURE: 138 MMHG | WEIGHT: 155 LBS | HEART RATE: 94 BPM

## 2021-09-22 DIAGNOSIS — N39.0 URINARY TRACT INFECTION, SITE NOT SPECIFIED: ICD-10-CM

## 2021-09-22 PROCEDURE — 99214 OFFICE O/P EST MOD 30 MIN: CPT | Mod: 25

## 2021-09-22 PROCEDURE — 96401 CHEMO ANTI-NEOPL SQ/IM: CPT

## 2021-09-22 RX ORDER — DENOSUMAB 60 MG/ML
60 INJECTION SUBCUTANEOUS
Qty: 1 | Refills: 0 | Status: COMPLETED | OUTPATIENT
Start: 2021-09-22

## 2021-09-22 RX ORDER — PREDNISONE 10 MG/1
10 TABLET ORAL
Qty: 20 | Refills: 0 | Status: DISCONTINUED | COMMUNITY
Start: 2021-08-30 | End: 2021-09-22

## 2021-09-22 RX ADMIN — DENOSUMAB 0 MG/ML: 60 INJECTION SUBCUTANEOUS at 00:00

## 2021-09-22 NOTE — ED STATDOCS - ATTENDING CONTRIBUTION TO CARE
Detail Level: Detailed Depth Of Biopsy: dermis Was A Bandage Applied: Yes Size Of Lesion In Cm: 0 Biopsy Type: H and E Biopsy Method: 15 blade Anesthesia Type: 1% lidocaine with epinephrine Anesthesia Volume In Cc (Will Not Render If 0): 0.5 Hemostasis: Drysol Wound Care: Petrolatum Dressing: bandage Destruction After The Procedure: No Type Of Destruction Used: Curettage Curettage Text: The wound bed was treated with curettage after the biopsy was performed. Cryotherapy Text: The wound bed was treated with cryotherapy after the biopsy was performed. Electrodesiccation Text: The wound bed was treated with electrodesiccation after the biopsy was performed. Electrodesiccation And Curettage Text: The wound bed was treated with electrodesiccation and curettage after the biopsy was performed. Silver Nitrate Text: The wound bed was treated with silver nitrate after the biopsy was performed. Lab: [id number] Lab Facility: [id number] Consent: Written consent was obtained and risks were reviewed including but not limited to scarring, infection, bleeding, scabbing, incomplete removal, nerve damage and allergy to anesthesia. Post-Care Instructions: I reviewed with the patient in detail post-care instructions. Patient is to keep the biopsy site dry overnight, and then apply bacitracin twice daily until healed. Patient may apply hydrogen peroxide soaks to remove any crusting. Notification Instructions: Patient will be notified of biopsy results. However, patient instructed to call the office if not contacted within 2 weeks. Billing Type: Third-Party Bill Information: Selecting Yes will display possible errors in your note based on the variables you have selected. This validation is only offered as a suggestion for you. PLEASE NOTE THAT THE VALIDATION TEXT WILL BE REMOVED WHEN YOU FINALIZE YOUR NOTE. IF YOU WANT TO FAX A PRELIMINARY NOTE YOU WILL NEED TO TOGGLE THIS TO 'NO' IF YOU DO NOT WANT IT IN YOUR FAXED NOTE. I, Galen Camarena, performed the initial face to face bedside interview with this patient regarding history of present illness, review of symptoms and relevant past medical, social and family history.  I completed an independent physical examination.  I was the initial provider who evaluated this patient. I have signed out the follow up of any pending tests (i.e. labs, radiological studies) to the ACP.  I have communicated the patient’s plan of care and disposition with the ACP.  The history, relevant review of systems, past medical and surgical history, medical decision making, and physical examination was documented by the scribe in my presence and I attest to the accuracy of the documentation.

## 2021-09-24 ENCOUNTER — APPOINTMENT (OUTPATIENT)
Dept: CARDIOLOGY | Facility: CLINIC | Age: 61
End: 2021-09-24
Payer: MEDICAID

## 2021-09-24 ENCOUNTER — APPOINTMENT (OUTPATIENT)
Dept: ORTHOPEDIC SURGERY | Facility: CLINIC | Age: 61
End: 2021-09-24

## 2021-09-24 PROCEDURE — 93306 TTE W/DOPPLER COMPLETE: CPT

## 2021-09-27 PROBLEM — N39.0 UTI (URINARY TRACT INFECTION): Status: RESOLVED | Noted: 2021-09-27 | Resolved: 2021-10-27

## 2021-09-27 LAB
ALBUMIN SERPL ELPH-MCNC: 4.4 G/DL
ALP BLD-CCNC: 112 U/L
ALT SERPL-CCNC: 29 U/L
ANION GAP SERPL CALC-SCNC: 13 MMOL/L
APPEARANCE: ABNORMAL
APPEARANCE: ABNORMAL
AST SERPL-CCNC: 27 U/L
BACTERIA: NEGATIVE
BASOPHILS # BLD AUTO: 0.04 K/UL
BASOPHILS NFR BLD AUTO: 0.5 %
BILIRUB SERPL-MCNC: 0.2 MG/DL
BILIRUBIN URINE: NEGATIVE
BILIRUBIN URINE: NEGATIVE
BLOOD URINE: NEGATIVE
BLOOD URINE: NEGATIVE
BUN SERPL-MCNC: 8 MG/DL
CALCIUM SERPL-MCNC: 10.1 MG/DL
CHLORIDE SERPL-SCNC: 102 MMOL/L
CO2 SERPL-SCNC: 24 MMOL/L
COLOR: YELLOW
COLOR: YELLOW
CREAT SERPL-MCNC: 0.71 MG/DL
CRP SERPL-MCNC: 4 MG/L
EOSINOPHIL # BLD AUTO: 0.21 K/UL
EOSINOPHIL NFR BLD AUTO: 2.5 %
ERYTHROCYTE [SEDIMENTATION RATE] IN BLOOD BY WESTERGREN METHOD: 16 MM/HR
GLUCOSE QUALITATIVE U: ABNORMAL
GLUCOSE QUALITATIVE U: ABNORMAL
GLUCOSE SERPL-MCNC: 188 MG/DL
HAV IGM SER QL: NONREACTIVE
HBV CORE IGM SER QL: NONREACTIVE
HBV SURFACE AG SER QL: NONREACTIVE
HCT VFR BLD CALC: 37.5 %
HCV AB SER QL: NONREACTIVE
HCV S/CO RATIO: 0.18 S/CO
HGB BLD-MCNC: 11.7 G/DL
HYALINE CASTS: 0 /LPF
IMM GRANULOCYTES NFR BLD AUTO: 0.2 %
KETONES URINE: NEGATIVE
KETONES URINE: NEGATIVE
LEUKOCYTE ESTERASE URINE: ABNORMAL
LEUKOCYTE ESTERASE URINE: ABNORMAL
LYMPHOCYTES # BLD AUTO: 3.96 K/UL
LYMPHOCYTES NFR BLD AUTO: 46.3 %
MAN DIFF?: NORMAL
MCHC RBC-ENTMCNC: 31 PG
MCHC RBC-ENTMCNC: 31.2 GM/DL
MCV RBC AUTO: 99.2 FL
MICROSCOPIC-UA: NORMAL
MONOCYTES # BLD AUTO: 0.58 K/UL
MONOCYTES NFR BLD AUTO: 6.8 %
NEUTROPHILS # BLD AUTO: 3.75 K/UL
NEUTROPHILS NFR BLD AUTO: 43.7 %
NITRITE URINE: NEGATIVE
NITRITE URINE: NEGATIVE
PH URINE: 6
PH URINE: 6
PLATELET # BLD AUTO: 330 K/UL
POTASSIUM SERPL-SCNC: 4.9 MMOL/L
PROT SERPL-MCNC: 7.1 G/DL
PROTEIN URINE: NEGATIVE
PROTEIN URINE: NEGATIVE
RBC # BLD: 3.78 M/UL
RBC # FLD: 15.2 %
RED BLOOD CELLS URINE: 2 /HPF
SODIUM SERPL-SCNC: 139 MMOL/L
SPECIFIC GRAVITY URINE: 1.01
SPECIFIC GRAVITY URINE: 1.01
SQUAMOUS EPITHELIAL CELLS: 7 /HPF
UROBILINOGEN URINE: NORMAL
UROBILINOGEN URINE: NORMAL
WBC # FLD AUTO: 8.56 K/UL
WHITE BLOOD CELLS URINE: 12 /HPF

## 2021-09-27 RX ORDER — NITROFURANTOIN (MONOHYDRATE/MACROCRYSTALS) 25; 75 MG/1; MG/1
100 CAPSULE ORAL
Qty: 10 | Refills: 0 | Status: ACTIVE | COMMUNITY
Start: 2020-10-20 | End: 1900-01-01

## 2021-09-27 NOTE — PHYSICAL EXAM
[General Appearance - Alert] : alert [General Appearance - In No Acute Distress] : in no acute distress [Sclera] : the sclera and conjunctiva were normal [Extraocular Movements] : extraocular movements were intact [Outer Ear] : the ears and nose were normal in appearance [Neck Appearance] : the appearance of the neck was normal [Respiration, Rhythm And Depth] : normal respiratory rhythm and effort [Heart Rate And Rhythm] : heart rate was normal and rhythm regular [Heart Sounds] : normal S1 and S2 [Abdomen Soft] : soft [Abdomen Tenderness] : non-tender [Cervical Lymph Nodes Enlarged Anterior Bilaterally] : anterior cervical [Supraclavicular Lymph Nodes Enlarged Bilaterally] : supraclavicular [No CVA Tenderness] : no ~M costovertebral angle tenderness [Motor Tone] : muscle strength and tone were normal [] : no rash [No Focal Deficits] : no focal deficits [Impaired Insight] : insight and judgment were intact [Mood] : the mood was normal [FreeTextEntry1] : no synovitis or effusion on exam noted today; good ROM in b/l shoulders

## 2021-09-27 NOTE — REVIEW OF SYSTEMS
[As Noted in HPI] : as noted in HPI [Fever] : no fever [Chills] : no chills [Eye Pain] : no eye pain [Red Eyes] : eyes not red [Nosebleeds] : no nosebleeds [Chest Pain] : no chest pain [Shortness Of Breath] : no shortness of breath [Abdominal Pain] : no abdominal pain [Dysuria] : no dysuria [Confused] : no confusion [Suicidal] : not suicidal [Muscle Weakness] : no muscle weakness [Easy Bleeding] : no tendency for easy bleeding

## 2021-09-27 NOTE — PROCEDURE
[Today's Date:] : Date: [unfilled] [Risks] : risks [Benefits] : benefits [Alternatives] : alternatives [Consent Obtained] : written consent was obtained prior to the procedure and is detailed in the patient's record [Patient] : Prior to the start of the procedure a time out was taken and the identity of the patient was confirmed via name and date of birth with the patient. The correct site and the procedure to be performed were confirmed. The correct side was confirmed if applicable. The availability of the correct equipment was verified [Therapeutic] : therapeutic [Ethyl Chloride] : ethyl chloride [Alcohol] : alcohol [Tolerated Well] : the patient tolerated the procedure well [No Complications] : there were no complications [#1 Site: ______] : #1 site identified in the [unfilled] [de-identified] : Prolia 60mg/ml lot# [id number] exp 02/24

## 2021-09-27 NOTE — ASSESSMENT
[FreeTextEntry1] : 61-year-old female, here for follow up w/ joint aches throughout the hands/MCPs/ PIPs/ bilaterally w/ synovitis w/ xray of b/l hands reading small well marginated erosion along proximal ulnar articular margin of 3rd DIP joint w/ Seronegative arthritis w/ raised ESR w/ concern for reactive arthritis given hx of recurrent UTIs; versus Seronegative RA in the differential w/ hx of dry eyes on drops w/ her Optho.\par \par Seronegative Arthritis: \par -reports she has been taking since last visit increased sulfasalazine 500mg 3 tabs AM and 3 tab PM with resolution of joint aches on it now\par -labs as below requested today \par -labs 8/23/2021 [facility] w/ CBC ok; CMP ok\par -had hx of recurrent UTIs \par -refill Sulfasalazine 500mg to 3 tabs AM and 3 tab PM w/ G6PD NL w/ pt agreeable and prescription sent as below\par -xray b/l hands 2/3/2020 reading small well marginated erosion along proximal ulnar articular margin of 3rd DIP joint\par -dry eyes: on drops w/ her Optho she reports\par -dry mouth: biotene mouthwash or toothpaste PRN \par -chest xray 8/16/2021 normal\par \par 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 and + [id number] IgM >150 high titer x2; LAC negative 6/15/2020\par -on ASA 81mg daily\par -referred to [facility] to discuss if needs AC and need to continue ASA; forgot to go from last visit and referral given again today \par -GUILLERMINA w/ IF neg w/o clinical symptoms of SLE at this time & monitoring w/ labs as below to be complete\par \par LBP: intermittent \par -xray LS spine 2/3/2020 w/ DDD; SI normal \par -Motrin PRN w/ food needed sparingly helps\par \par Knee OA: xray b/l knees 2/3/2020 read as OA chagnes Rt knee\par -discussed she can consider steroid injection as needed \par \par Intermittent numbness/tingling: intermittent in hands and feet likely 2/2 to DM on insulin.\par -metabolic labs folate, B12, TSH normal for it 1/2020\par -states she takes gabapentin BID \par -knows if persistent can consider EMG w/ Neuro\par \par Osteoporosis: on Dexa 2/3/2020 w/ worse t-score -3.1 at spine\par -postmenopausal, no fractures; FH of it in sister\par -defers Fosamax bc states she has GERD and worries about compliance w/ bisphosphate \par -reviewed labs 1/6/2021 w/ Nl Ca=10 w/ last prolia 1/6/2021 & encouraged compliance w/ getting it \par -on prolia and states no extractions or implants w/ dentist and knows to let her dentist know she is on prolia\par -Risks and benefits of prolia use were d/w patient including but not limited to myalgias, flu like symptoms, atypical fractures, infections, avascular necrosis of the jaw and hypocalcemia.\par -today given Prolia 60mg/ml lot # [id number] exp 02/24 to Rt upper arm & tolerated well \par -discussed to continue Ca+vitD supplementation \par -encouraged weight bearing exercises as tolerated.\par \par -educated on symptoms to monitor for in detail and alert us if any concerns.\par -knows to stay up to date on health maintenance w/ PCP; encourage compliance \par -f/u in 3mo or sooner as needed \par \par Addendum: informed UA micro and culture 9/2021 consistent w/ UTI w/ reports of dysuria susceptible to Nitrofurantoin called in and knows to alert physician if any concerns.\par \par  \par

## 2021-09-27 NOTE — REASON FOR VISIT
[Follow-Up: _____] : a [unfilled] follow-up visit [FreeTextEntry1] : Seronegative erosive arthritis; labs/meds; Osteoporosis; get Prolia \par

## 2021-09-27 NOTE — HISTORY OF PRESENT ILLNESS
[FreeTextEntry1] : 61-year-old female here for follow up of her seronegative erosive arthritis and Osteoporosis to get prolia today also.\par \par States she has been taking Sulfasalazine 500mg 3 tabs Am and 3 tab PM since last visit and needs to do labs on it today.\par States the joint aches in the b/l hands/MCPs/PIPs is much better now the medication now.\par Denies any swelling or redness or warmth of any joints on the sulfasalazine.\par Has full ROM in b/l shoulders, no pelvic/girdle stiffness and able to stand up without using her hands, no temporal pain/unremitting headaches, no vision changes, no jaw pain.\par Denies any fever/chills, no rashes, no ulcers, + dry eyes w/ her Optho, Dr. Kane Kaplan; + dry mouth at times, no raynaud's, no GI/ symptoms at this time.\par \par Reports hx of 1 DVT around 2014 w/ PE and no other blood clots; no stroke; 5 healthy pregnancies; no miscarriages. States taking ASA 81mg daily and needs referral for Heme/Onc again for high + beta glycoprotein.\par \par She is postmenopausal, no prior fractures and has FH of osteoporosis in her sister. States she does have GERD so defers PO bisphosphonates and doing well on Prolia. States  no extractions or implants w/ dentist and knows to let her dentist know she is on prolia.\par \par

## 2021-10-04 ENCOUNTER — APPOINTMENT (OUTPATIENT)
Dept: UROLOGY | Facility: CLINIC | Age: 61
End: 2021-10-04
Payer: MEDICAID

## 2021-10-04 VITALS
SYSTOLIC BLOOD PRESSURE: 124 MMHG | BODY MASS INDEX: 30.43 KG/M2 | WEIGHT: 155 LBS | TEMPERATURE: 96.7 F | HEART RATE: 96 BPM | HEIGHT: 60 IN | OXYGEN SATURATION: 100 % | DIASTOLIC BLOOD PRESSURE: 64 MMHG

## 2021-10-04 DIAGNOSIS — N39.0 URINARY TRACT INFECTION, SITE NOT SPECIFIED: ICD-10-CM

## 2021-10-04 PROCEDURE — 51798 US URINE CAPACITY MEASURE: CPT

## 2021-10-04 PROCEDURE — 51797 INTRAABDOMINAL PRESSURE TEST: CPT

## 2021-10-04 PROCEDURE — 51784 ANAL/URINARY MUSCLE STUDY: CPT

## 2021-10-04 PROCEDURE — 51729 CYSTOMETROGRAM W/VP&UP: CPT

## 2021-10-04 PROCEDURE — 51741 ELECTRO-UROFLOWMETRY FIRST: CPT

## 2021-10-05 LAB
APPEARANCE: ABNORMAL
BACTERIA: ABNORMAL
BILIRUBIN URINE: NEGATIVE
BLOOD URINE: NORMAL
COLOR: ABNORMAL
GLUCOSE QUALITATIVE U: ABNORMAL
HYALINE CASTS: 2 /LPF
KETONES URINE: NEGATIVE
LEUKOCYTE ESTERASE URINE: ABNORMAL
MICROSCOPIC-UA: NORMAL
NITRITE URINE: POSITIVE
PH URINE: 7
PROTEIN URINE: NORMAL
RED BLOOD CELLS URINE: 9 /HPF
SPECIFIC GRAVITY URINE: 1.02
SQUAMOUS EPITHELIAL CELLS: 2 /HPF
UROBILINOGEN URINE: NORMAL
WHITE BLOOD CELLS URINE: 42 /HPF

## 2021-10-05 RX ORDER — CIPROFLOXACIN HYDROCHLORIDE 500 MG/1
500 TABLET, FILM COATED ORAL
Qty: 8 | Refills: 0 | Status: ACTIVE | COMMUNITY
Start: 2021-10-04 | End: 1900-01-01

## 2021-10-05 RX ORDER — NITROFURANTOIN MACROCRYSTALS 50 MG/1
50 CAPSULE ORAL
Qty: 90 | Refills: 3 | Status: ACTIVE | COMMUNITY
Start: 2021-10-04 | End: 1900-01-01

## 2021-10-05 RX ORDER — BETHANECHOL CHLORIDE 25 MG/1
25 TABLET ORAL
Qty: 180 | Refills: 3 | Status: ACTIVE | COMMUNITY
Start: 2021-10-04 | End: 1900-01-01

## 2021-10-07 LAB — BACTERIA UR CULT: ABNORMAL

## 2021-10-11 ENCOUNTER — NON-APPOINTMENT (OUTPATIENT)
Age: 61
End: 2021-10-11

## 2021-10-11 DIAGNOSIS — N39.0 URINARY TRACT INFECTION, SITE NOT SPECIFIED: ICD-10-CM

## 2021-10-14 ENCOUNTER — APPOINTMENT (OUTPATIENT)
Dept: CARDIOLOGY | Facility: CLINIC | Age: 61
End: 2021-10-14

## 2021-10-28 RX ORDER — DENOSUMAB 60 MG/ML
60 INJECTION SUBCUTANEOUS
Qty: 1 | Refills: 0 | Status: ACTIVE | COMMUNITY
Start: 2020-02-19

## 2021-11-03 ENCOUNTER — RX RENEWAL (OUTPATIENT)
Age: 61
End: 2021-11-03

## 2021-11-03 RX ORDER — CALCIUM CARBONATE/VITAMIN D3 600 MG-10
600-400 TABLET ORAL
Qty: 60 | Refills: 1 | Status: ACTIVE | COMMUNITY
Start: 2021-02-03 | End: 1900-01-01

## 2021-11-10 RX ORDER — CYCLOBENZAPRINE HYDROCHLORIDE 5 MG/1
5 TABLET, FILM COATED ORAL
Qty: 120 | Refills: 0 | Status: ACTIVE | COMMUNITY
Start: 1900-01-01 | End: 1900-01-01

## 2021-11-16 ENCOUNTER — APPOINTMENT (OUTPATIENT)
Dept: UROLOGY | Facility: CLINIC | Age: 61
End: 2021-11-16
Payer: MEDICAID

## 2021-11-16 DIAGNOSIS — N95.2 POSTMENOPAUSAL ATROPHIC VAGINITIS: ICD-10-CM

## 2021-11-16 LAB
BILIRUB UR QL STRIP: NORMAL
GLUCOSE UR-MCNC: NORMAL
HCG UR QL: 0.2 EU/DL
HGB UR QL STRIP.AUTO: NORMAL
KETONES UR-MCNC: NORMAL
LEUKOCYTE ESTERASE UR QL STRIP: NORMAL
NITRITE UR QL STRIP: NORMAL
PH UR STRIP: 7
PROT UR STRIP-MCNC: NORMAL
SP GR UR STRIP: 1.01

## 2021-11-16 PROCEDURE — 81003 URINALYSIS AUTO W/O SCOPE: CPT | Mod: QW

## 2021-11-16 PROCEDURE — 99213 OFFICE O/P EST LOW 20 MIN: CPT

## 2021-11-16 RX ORDER — METRONIDAZOLE 250 MG/1
250 TABLET ORAL
Qty: 3 | Refills: 0 | Status: ACTIVE | COMMUNITY
Start: 2021-11-16 | End: 1900-01-01

## 2021-11-16 NOTE — ED STATDOCS - NSDCPRINTRESULTS_ED_ALL_ED
WENDI GRIJALVA  MEDICAL RECORD NO:  [id number]                      : Kan Freeman, " MD  Professor  [facility]  [phone].       Patient requests all Lab and Radiology Results on their Discharge Instructions

## 2021-11-16 NOTE — END OF VISIT
[FreeTextEntry3] : I prescribed metronidazole and Diflucan.  The urine is sent for culture analysis and plans a follow-up

## 2021-11-16 NOTE — HISTORY OF PRESENT ILLNESS
[FreeTextEntry1] : This patient complains of some vaginal discomfort.  Her urine dips negative today

## 2021-11-17 LAB
APPEARANCE: CLEAR
BACTERIA: ABNORMAL
BILIRUBIN URINE: NEGATIVE
BLOOD URINE: NEGATIVE
COLOR: NORMAL
GLUCOSE QUALITATIVE U: ABNORMAL
HYALINE CASTS: 0 /LPF
KETONES URINE: NEGATIVE
LEUKOCYTE ESTERASE URINE: ABNORMAL
MICROSCOPIC-UA: NORMAL
NITRITE URINE: NEGATIVE
PH URINE: 7.5
PROTEIN URINE: NEGATIVE
RED BLOOD CELLS URINE: 2 /HPF
SPECIFIC GRAVITY URINE: 1.01
SQUAMOUS EPITHELIAL CELLS: 2 /HPF
UROBILINOGEN URINE: NORMAL
WHITE BLOOD CELLS URINE: 32 /HPF

## 2021-11-19 LAB — BACTERIA UR CULT: ABNORMAL

## 2021-11-19 RX ORDER — AMOXICILLIN AND CLAVULANATE POTASSIUM 875; 125 MG/1; MG/1
875-125 TABLET, COATED ORAL
Qty: 20 | Refills: 0 | Status: ACTIVE | COMMUNITY
Start: 2021-10-11 | End: 1900-01-01

## 2021-11-19 RX ORDER — FLUCONAZOLE 150 MG/1
150 TABLET ORAL
Qty: 2 | Refills: 0 | Status: COMPLETED | COMMUNITY
Start: 2021-11-16 | End: 2021-11-19

## 2021-11-19 RX ORDER — METRONIDAZOLE 250 MG/1
250 TABLET ORAL
Qty: 3 | Refills: 0 | Status: COMPLETED | COMMUNITY
Start: 2021-09-08 | End: 2021-11-19

## 2021-11-26 LAB — URINE CYTOLOGY: NORMAL

## 2021-12-07 ENCOUNTER — APPOINTMENT (OUTPATIENT)
Dept: UROLOGY | Facility: CLINIC | Age: 61
End: 2021-12-07
Payer: MEDICAID

## 2021-12-07 DIAGNOSIS — N39.0 URINARY TRACT INFECTION, SITE NOT SPECIFIED: ICD-10-CM

## 2021-12-07 DIAGNOSIS — E11.9 TYPE 2 DIABETES MELLITUS W/OUT COMPLICATIONS: ICD-10-CM

## 2021-12-07 PROCEDURE — 99214 OFFICE O/P EST MOD 30 MIN: CPT

## 2021-12-07 RX ORDER — NITROFURANTOIN MACROCRYSTALS 100 MG/1
100 CAPSULE ORAL 3 TIMES DAILY
Qty: 21 | Refills: 0 | Status: ACTIVE | COMMUNITY
Start: 2021-12-07 | End: 1900-01-01

## 2021-12-07 NOTE — END OF VISIT
[FreeTextEntry3] : I have asked her to see our department in neurosurgery regarding her back pain and to see her primary doctor regarding better diabetic control.  Macrodantin was prescribed and will be compared to her culture when that is available

## 2021-12-07 NOTE — HISTORY OF PRESENT ILLNESS
[FreeTextEntry1] : This patient is here for the evaluation of back pain and recurrent urinary tract infections.  Her urine is always show high sugar and she states she does not always take her diabetic medication.  She has had chronic back pain for some time

## 2021-12-09 ENCOUNTER — APPOINTMENT (OUTPATIENT)
Dept: RHEUMATOLOGY | Facility: CLINIC | Age: 61
End: 2021-12-09
Payer: MEDICAID

## 2021-12-09 VITALS
OXYGEN SATURATION: 98 % | WEIGHT: 160 LBS | HEART RATE: 88 BPM | DIASTOLIC BLOOD PRESSURE: 72 MMHG | TEMPERATURE: 97.9 F | BODY MASS INDEX: 31.25 KG/M2 | SYSTOLIC BLOOD PRESSURE: 122 MMHG

## 2021-12-09 DIAGNOSIS — M85.80 OTHER SPECIFIED DISORDERS OF BONE DENSITY AND STRUCTURE, UNSPECIFIED SITE: ICD-10-CM

## 2021-12-09 DIAGNOSIS — M47.816 SPONDYLOSIS W/OUT MYELOPATHY OR RADICULOPATHY, LUMBAR REGION: ICD-10-CM

## 2021-12-09 DIAGNOSIS — M81.0 AGE-RELATED OSTEOPOROSIS W/OUT CURRENT PATHOLOGICAL FRACTURE: ICD-10-CM

## 2021-12-09 DIAGNOSIS — M13.80 OTHER SPECIFIED ARTHRITIS, UNSPECIFIED SITE: ICD-10-CM

## 2021-12-09 DIAGNOSIS — N39.0 URINARY TRACT INFECTION, SITE NOT SPECIFIED: ICD-10-CM

## 2021-12-09 DIAGNOSIS — R76.0 RAISED ANTIBODY TITER: ICD-10-CM

## 2021-12-09 DIAGNOSIS — M19.049 PRIMARY OSTEOARTHRITIS, UNSPECIFIED HAND: ICD-10-CM

## 2021-12-09 PROCEDURE — 36415 COLL VENOUS BLD VENIPUNCTURE: CPT

## 2021-12-09 PROCEDURE — 99214 OFFICE O/P EST MOD 30 MIN: CPT | Mod: 25

## 2021-12-09 RX ORDER — SULFASALAZINE 500 MG/1
500 TABLET ORAL
Qty: 360 | Refills: 1 | Status: ACTIVE | COMMUNITY
Start: 2020-02-19 | End: 1900-01-01

## 2021-12-09 RX ORDER — MULTIVITAMIN/IRON/FOLIC ACID 18MG-0.4MG
600-400 TABLET ORAL
Qty: 120 | Refills: 1 | Status: ACTIVE | COMMUNITY
Start: 2020-05-13 | End: 1900-01-01

## 2021-12-09 NOTE — ASSESSMENT
[FreeTextEntry1] : 61-year-old female, here for follow up w/ joint aches throughout the hands/MCPs/ PIPs/ bilaterally w/ synovitis w/ xray of b/l hands reading small well marginated erosion along proximal ulnar articular margin of 3rd DIP joint w/ Seronegative arthritis w/ raised ESR w/ concern for reactive arthritis given hx of recurrent UTIs; versus Seronegative RA in the differential w/ hx of dry eyes on drops w/ her Optho.\par \par Seronegative Arthritis: stable today without synovitis \par -labs as below requested today \par -reviewed labs 9/22/21 w/ normal ESR/CRP; CBC ok; CMP w/ high glu (advised to control DM better please)\par -refill Sulfasalazine 500mg to 3 tabs AM and 3 tab PM w/ G6PD NL w/ pt agreeable and prescription sent as below\par -had hx of recurrent UTIs and states on antibiotic for UTI since yesterday now for dysuria\par -xray b/l hands 2/3/2020 reading small well marginated erosion along proximal ulnar articular margin of 3rd DIP joint\par -dry eyes: on drops w/ her Optho she reports\par -dry mouth: biotene mouthwash or toothpaste PRN \par -chest xray 8/16/2021 normal\par \par 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 and + [id number] IgM >150 high titer x2; LAC negative 6/15/2020\par -on ASA 81mg daily\par -referred to Heme to discuss if needs any further AC; forgot to go from last visit and referral given again today \par -GUILLERMINA w/ IF neg w/o clinical symptoms of SLE at this time & monitoring \par \par LBP: intermittent \par -xray LS spine 2/3/2020 w/ DDD; SI normal \par -Motrin PRN w/ food needed sparingly helps\par \par Knee OA: xray b/l knees 2/3/2020 read as OA changes Rt knee\par -discussed she can consider steroid injection as needed \par \par Intermittent numbness/tingling: intermittent in hands and feet likely 2/2 to DM on insulin.\par -metabolic labs folate, B12, TSH normal for it 1/2020\par -states she takes gabapentin BID \par -knows if persistent can consider EMG w/ Neuro\par \par Osteoporosis: on Dexa 2/3/2020 w/ worse t-score -3.1 at spine\par -Dexa referral given to do after 2/3/22\par -postmenopausal, no fractures; FH of it in sister\par -defers Fosamax bc states she has GERD and worries about compliance w/ bisphosphate \par -on prolia and states no extractions or implants w/ dentist to avoid and knows to let her dentist know she is on prolia\par -prolia last give 9/22/21 q 6 mo\par -discussed to continue Ca+vitD supplementation \par -encouraged weight bearing exercises as tolerated.\par \par -educated on symptoms to monitor for in detail and alert us if any concerns.\par -knows to stay up to date on health maintenance w/ PCP; encourage compliance \par -f/u in 3 mo w/ Dexa, prolia or sooner as needed \par

## 2021-12-09 NOTE — REASON FOR VISIT
[Follow-Up: _____] : a [unfilled] follow-up visit [FreeTextEntry1] : Seronegative erosive arthritis; labs/meds; Osteoporosis\par \par

## 2021-12-09 NOTE — PHYSICAL EXAM
[General Appearance - Alert] : alert [General Appearance - In No Acute Distress] : in no acute distress [Sclera] : the sclera and conjunctiva were normal [Extraocular Movements] : extraocular movements were intact [Outer Ear] : the ears and nose were normal in appearance [Neck Appearance] : the appearance of the neck was normal [Respiration, Rhythm And Depth] : normal respiratory rhythm and effort [Heart Rate And Rhythm] : heart rate was normal and rhythm regular [Heart Sounds] : normal S1 and S2 [Abdomen Soft] : soft [Abdomen Tenderness] : non-tender [Cervical Lymph Nodes Enlarged Anterior Bilaterally] : anterior cervical [Supraclavicular Lymph Nodes Enlarged Bilaterally] : supraclavicular [No CVA Tenderness] : no ~M costovertebral angle tenderness [Motor Tone] : muscle strength and tone were normal [] : no rash [No Focal Deficits] : no focal deficits [Impaired Insight] : insight and judgment were intact [Mood] : the mood was normal [FreeTextEntry1] : no synovitis or effusion on exam noted today

## 2021-12-09 NOTE — HISTORY OF PRESENT ILLNESS
[FreeTextEntry1] : 61-year-old female here for follow up of her seronegative erosive arthritis and Osteoporosis to get prolia today also.\par \par States she has been taking Sulfasalazine 500mg 3 tabs Am and 3 tab PM and needs to do labs on it today.\par States the joint aches in the b/l hands/MCPs/PIPs is much better now the medication now.\par Denies any swelling or redness or warmth of any joints on the sulfasalazine.\par Has full ROM in b/l shoulders, no pelvic/girdle stiffness and able to stand up without using her hands, no temporal pain/unremitting headaches, no vision changes, no jaw pain.\par Denies any fever/chills, no rashes, no ulcers, + dry eyes w/ her Optho, Dr. Kane Kaplan; + dry mouth at times, no raynaud's, no GI/ symptoms at this time.\par \par Reports hx of 1 DVT around 2014 w/ PE and no other blood clots; no stroke; 5 healthy pregnancies; no miscarriages. States taking ASA 81mg daily and needs referral for Heme/Onc again for high + beta glycoprotein to see if needs any further AC.\par \par She is postmenopausal, no prior fractures and has FH of osteoporosis in her sister. States she does have GERD so defers PO bisphosphonates and doing well on Prolia. States no extractions or implants w/ dentist to avoid and knows to let her dentist know she is on prolia.\par \par \par

## 2021-12-10 ENCOUNTER — EMERGENCY (EMERGENCY)
Facility: HOSPITAL | Age: 61
LOS: 0 days | Discharge: HOME | End: 2021-12-11
Attending: FAMILY MEDICINE
Payer: MEDICAID

## 2021-12-10 VITALS — HEIGHT: 60 IN | WEIGHT: 169.98 LBS

## 2021-12-10 DIAGNOSIS — R30.0 DYSURIA: ICD-10-CM

## 2021-12-10 DIAGNOSIS — Z79.82 LONG TERM (CURRENT) USE OF ASPIRIN: ICD-10-CM

## 2021-12-10 DIAGNOSIS — Z98.890 OTHER SPECIFIED POSTPROCEDURAL STATES: Chronic | ICD-10-CM

## 2021-12-10 DIAGNOSIS — D64.9 ANEMIA, UNSPECIFIED: ICD-10-CM

## 2021-12-10 DIAGNOSIS — R11.2 NAUSEA WITH VOMITING, UNSPECIFIED: ICD-10-CM

## 2021-12-10 DIAGNOSIS — E11.3592 TYPE 2 DIABETES MELLITUS WITH PROLIFERATIVE DIABETIC RETINOPATHY WITHOUT MACULAR EDEMA, LEFT EYE: Chronic | ICD-10-CM

## 2021-12-10 DIAGNOSIS — I10 ESSENTIAL (PRIMARY) HYPERTENSION: ICD-10-CM

## 2021-12-10 DIAGNOSIS — Z79.84 LONG TERM (CURRENT) USE OF ORAL HYPOGLYCEMIC DRUGS: ICD-10-CM

## 2021-12-10 DIAGNOSIS — Z88.0 ALLERGY STATUS TO PENICILLIN: ICD-10-CM

## 2021-12-10 DIAGNOSIS — E78.00 PURE HYPERCHOLESTEROLEMIA, UNSPECIFIED: ICD-10-CM

## 2021-12-10 DIAGNOSIS — Z20.822 CONTACT WITH AND (SUSPECTED) EXPOSURE TO COVID-19: ICD-10-CM

## 2021-12-10 DIAGNOSIS — R10.9 UNSPECIFIED ABDOMINAL PAIN: ICD-10-CM

## 2021-12-10 DIAGNOSIS — Z95.828 PRESENCE OF OTHER VASCULAR IMPLANTS AND GRAFTS: Chronic | ICD-10-CM

## 2021-12-10 DIAGNOSIS — Z88.5 ALLERGY STATUS TO NARCOTIC AGENT: ICD-10-CM

## 2021-12-10 DIAGNOSIS — Z79.4 LONG TERM (CURRENT) USE OF INSULIN: ICD-10-CM

## 2021-12-10 DIAGNOSIS — Z88.1 ALLERGY STATUS TO OTHER ANTIBIOTIC AGENTS STATUS: ICD-10-CM

## 2021-12-10 DIAGNOSIS — Z98.49 CATARACT EXTRACTION STATUS, UNSPECIFIED EYE: Chronic | ICD-10-CM

## 2021-12-10 DIAGNOSIS — E11.319 TYPE 2 DIABETES MELLITUS WITH UNSPECIFIED DIABETIC RETINOPATHY WITHOUT MACULAR EDEMA: ICD-10-CM

## 2021-12-10 DIAGNOSIS — M25.9 JOINT DISORDER, UNSPECIFIED: Chronic | ICD-10-CM

## 2021-12-10 DIAGNOSIS — Z98.51 TUBAL LIGATION STATUS: Chronic | ICD-10-CM

## 2021-12-10 DIAGNOSIS — E78.5 HYPERLIPIDEMIA, UNSPECIFIED: ICD-10-CM

## 2021-12-10 LAB
ALBUMIN SERPL ELPH-MCNC: 3.9 G/DL — SIGNIFICANT CHANGE UP (ref 3.3–5)
ALP SERPL-CCNC: 98 U/L — SIGNIFICANT CHANGE UP (ref 40–120)
ALT FLD-CCNC: 30 U/L — SIGNIFICANT CHANGE UP (ref 12–78)
ANION GAP SERPL CALC-SCNC: 4 MMOL/L — LOW (ref 5–17)
APPEARANCE UR: CLEAR — SIGNIFICANT CHANGE UP
APTT BLD: 30.8 SEC — SIGNIFICANT CHANGE UP (ref 27.5–35.5)
AST SERPL-CCNC: 28 U/L — SIGNIFICANT CHANGE UP (ref 15–37)
BASOPHILS # BLD AUTO: 0.02 K/UL — SIGNIFICANT CHANGE UP (ref 0–0.2)
BASOPHILS NFR BLD AUTO: 0.3 % — SIGNIFICANT CHANGE UP (ref 0–2)
BILIRUB SERPL-MCNC: 0.2 MG/DL — SIGNIFICANT CHANGE UP (ref 0.2–1.2)
BILIRUB UR-MCNC: NEGATIVE — SIGNIFICANT CHANGE UP
BUN SERPL-MCNC: 9 MG/DL — SIGNIFICANT CHANGE UP (ref 7–23)
CALCIUM SERPL-MCNC: 9 MG/DL — SIGNIFICANT CHANGE UP (ref 8.5–10.1)
CHLORIDE SERPL-SCNC: 105 MMOL/L — SIGNIFICANT CHANGE UP (ref 96–108)
CO2 SERPL-SCNC: 30 MMOL/L — SIGNIFICANT CHANGE UP (ref 22–31)
COLOR SPEC: YELLOW — SIGNIFICANT CHANGE UP
CREAT SERPL-MCNC: 0.84 MG/DL — SIGNIFICANT CHANGE UP (ref 0.5–1.3)
DIFF PNL FLD: NEGATIVE — SIGNIFICANT CHANGE UP
EOSINOPHIL # BLD AUTO: 0.25 K/UL — SIGNIFICANT CHANGE UP (ref 0–0.5)
EOSINOPHIL NFR BLD AUTO: 3.5 % — SIGNIFICANT CHANGE UP (ref 0–6)
GLUCOSE SERPL-MCNC: 109 MG/DL — HIGH (ref 70–99)
GLUCOSE UR QL: NEGATIVE MG/DL — SIGNIFICANT CHANGE UP
HCT VFR BLD CALC: 25.8 % — LOW (ref 34.5–45)
HGB BLD-MCNC: 8.3 G/DL — LOW (ref 11.5–15.5)
IMM GRANULOCYTES NFR BLD AUTO: 0.3 % — SIGNIFICANT CHANGE UP (ref 0–1.5)
INR BLD: 0.89 RATIO — SIGNIFICANT CHANGE UP (ref 0.88–1.16)
KETONES UR-MCNC: NEGATIVE — SIGNIFICANT CHANGE UP
LACTATE SERPL-SCNC: 3.1 MMOL/L — HIGH (ref 0.7–2)
LEUKOCYTE ESTERASE UR-ACNC: NEGATIVE — SIGNIFICANT CHANGE UP
LIDOCAIN IGE QN: 63 U/L — LOW (ref 73–393)
LYMPHOCYTES # BLD AUTO: 2.88 K/UL — SIGNIFICANT CHANGE UP (ref 1–3.3)
LYMPHOCYTES # BLD AUTO: 40.8 % — SIGNIFICANT CHANGE UP (ref 13–44)
MCHC RBC-ENTMCNC: 31.3 PG — SIGNIFICANT CHANGE UP (ref 27–34)
MCHC RBC-ENTMCNC: 32.2 GM/DL — SIGNIFICANT CHANGE UP (ref 32–36)
MCV RBC AUTO: 97.4 FL — SIGNIFICANT CHANGE UP (ref 80–100)
MONOCYTES # BLD AUTO: 0.52 K/UL — SIGNIFICANT CHANGE UP (ref 0–0.9)
MONOCYTES NFR BLD AUTO: 7.4 % — SIGNIFICANT CHANGE UP (ref 2–14)
NEUTROPHILS # BLD AUTO: 3.37 K/UL — SIGNIFICANT CHANGE UP (ref 1.8–7.4)
NEUTROPHILS NFR BLD AUTO: 47.7 % — SIGNIFICANT CHANGE UP (ref 43–77)
NITRITE UR-MCNC: NEGATIVE — SIGNIFICANT CHANGE UP
PH UR: 8 — SIGNIFICANT CHANGE UP (ref 5–8)
PLATELET # BLD AUTO: 183 K/UL — SIGNIFICANT CHANGE UP (ref 150–400)
POTASSIUM SERPL-MCNC: 3.7 MMOL/L — SIGNIFICANT CHANGE UP (ref 3.5–5.3)
POTASSIUM SERPL-SCNC: 3.7 MMOL/L — SIGNIFICANT CHANGE UP (ref 3.5–5.3)
PROT SERPL-MCNC: 7.5 GM/DL — SIGNIFICANT CHANGE UP (ref 6–8.3)
PROT UR-MCNC: NEGATIVE MG/DL — SIGNIFICANT CHANGE UP
PROTHROM AB SERPL-ACNC: 10.4 SEC — LOW (ref 10.6–13.6)
RBC # BLD: 2.65 M/UL — LOW (ref 3.8–5.2)
RBC # FLD: 13.1 % — SIGNIFICANT CHANGE UP (ref 10.3–14.5)
SODIUM SERPL-SCNC: 139 MMOL/L — SIGNIFICANT CHANGE UP (ref 135–145)
SP GR SPEC: 1.01 — SIGNIFICANT CHANGE UP (ref 1.01–1.02)
UROBILINOGEN FLD QL: NEGATIVE MG/DL — SIGNIFICANT CHANGE UP
WBC # BLD: 7.06 K/UL — SIGNIFICANT CHANGE UP (ref 3.8–10.5)
WBC # FLD AUTO: 7.06 K/UL — SIGNIFICANT CHANGE UP (ref 3.8–10.5)

## 2021-12-10 PROCEDURE — 36415 COLL VENOUS BLD VENIPUNCTURE: CPT

## 2021-12-10 PROCEDURE — 99284 EMERGENCY DEPT VISIT MOD MDM: CPT | Mod: 25

## 2021-12-10 PROCEDURE — 85025 COMPLETE CBC W/AUTO DIFF WBC: CPT

## 2021-12-10 PROCEDURE — 85610 PROTHROMBIN TIME: CPT

## 2021-12-10 PROCEDURE — 81003 URINALYSIS AUTO W/O SCOPE: CPT

## 2021-12-10 PROCEDURE — 71045 X-RAY EXAM CHEST 1 VIEW: CPT

## 2021-12-10 PROCEDURE — 96374 THER/PROPH/DIAG INJ IV PUSH: CPT | Mod: XU

## 2021-12-10 PROCEDURE — 85730 THROMBOPLASTIN TIME PARTIAL: CPT

## 2021-12-10 PROCEDURE — 74177 CT ABD & PELVIS W/CONTRAST: CPT | Mod: MA

## 2021-12-10 PROCEDURE — 87086 URINE CULTURE/COLONY COUNT: CPT

## 2021-12-10 PROCEDURE — 83605 ASSAY OF LACTIC ACID: CPT

## 2021-12-10 PROCEDURE — U0003: CPT

## 2021-12-10 PROCEDURE — 99285 EMERGENCY DEPT VISIT HI MDM: CPT

## 2021-12-10 PROCEDURE — 80053 COMPREHEN METABOLIC PANEL: CPT

## 2021-12-10 PROCEDURE — 83690 ASSAY OF LIPASE: CPT

## 2021-12-10 PROCEDURE — U0005: CPT

## 2021-12-10 RX ORDER — SODIUM CHLORIDE 9 MG/ML
2000 INJECTION INTRAMUSCULAR; INTRAVENOUS; SUBCUTANEOUS ONCE
Refills: 0 | Status: COMPLETED | OUTPATIENT
Start: 2021-12-10 | End: 2021-12-10

## 2021-12-10 RX ORDER — CEFTRIAXONE 500 MG/1
1000 INJECTION, POWDER, FOR SOLUTION INTRAMUSCULAR; INTRAVENOUS ONCE
Refills: 0 | Status: DISCONTINUED | OUTPATIENT
Start: 2021-12-10 | End: 2021-12-10

## 2021-12-10 RX ORDER — CEFTRIAXONE 500 MG/1
1000 INJECTION, POWDER, FOR SOLUTION INTRAMUSCULAR; INTRAVENOUS ONCE
Refills: 0 | Status: COMPLETED | OUTPATIENT
Start: 2021-12-10 | End: 2021-12-10

## 2021-12-10 RX ADMIN — SODIUM CHLORIDE 2000 MILLILITER(S): 9 INJECTION INTRAMUSCULAR; INTRAVENOUS; SUBCUTANEOUS at 23:04

## 2021-12-10 RX ADMIN — CEFTRIAXONE 100 MILLIGRAM(S): 500 INJECTION, POWDER, FOR SOLUTION INTRAMUSCULAR; INTRAVENOUS at 22:10

## 2021-12-10 NOTE — ED STATDOCS - OBJECTIVE STATEMENT
60 y/o female with PMHx of anxiety, bilateral cataracts (legally blind), DM II, DJD, DVT/PE (on ASA), GERD, HLD, HTN, Neuropathy, TIA presents to the ED for evaluation. Pt states she has a UTI and is being treated by her Urologist: Dr. Merritt. States she took antibiotics for 2 weeks, but is still having continued symptoms. States she has been having a lot of dysuria, left flank pain, abdominal pain and swelling. Also endorses nausea, vomiting, as well as constipation. States sometimes she has to use suppositories. PCP: Dr. Guerrero Co. Allergic to Amoxicillin, Hydrocodone, Oxycodone, Penicillin.

## 2021-12-10 NOTE — ED ADULT TRIAGE NOTE - CHIEF COMPLAINT QUOTE
c/o abdominal pain with anxiety, patient reports no bowel movements for past 5 days, c/o burning when urinating, was taking antibiotics for UTI 3 weeks ago, stopped taking medication and started again 2 days ago with no relief in symptoms, pain is worse today, denies fever or hematuria HX legal blind , patient spouse shu crain #[phone]

## 2021-12-10 NOTE — ED STATDOCS - CARE PROVIDER_API CALL
Chito Merritt)  Urology  [address], [address] [place] [address]  Phone: [phone]  Fax: [phone]  Follow Up Time:

## 2021-12-10 NOTE — ED STATDOCS - PATIENT PORTAL LINK FT
You can access the FollowMyHealth Patient Portal offered by [facility] by registering at the following website: [email or web address]. By joining [employer]’s FollowMyHealth portal, you will also be able to view your health information using other applications (apps) compatible with our system.

## 2021-12-10 NOTE — ED STATDOCS - NSICDXPASTSURGICALHX_GEN_ALL_CORE_FT
PAST SURGICAL HISTORY:  [place] filter in place left    H/O tubal ligation     History of cystoscopy history  nephrolithiasis and recurrent UTI S/P ESWL 5/2016    History of loop recorder     Proliferative diabetic retinopathy of left eye s/p PPV/laser/silicone  5/31/16 and 11/29/16 OS    S/P cataract surgery b/l    Shoulder disorder left

## 2021-12-10 NOTE — ED STATDOCS - CLINICAL SUMMARY MEDICAL DECISION MAKING FREE TEXT BOX
Pt in room 14 for evaluation of right ankle pain.  Pt is awake, alert and oriented. Resp even and unlabored. David breath sounds clear.  Pt denies chest pain or sob.  Abd soft and non-tender. Pt reports pain and swelling to right ankle from twist it today.  Painful to bear weight.   
Labs, UA, IV antibiotics, CT AP.

## 2021-12-10 NOTE — ED STATDOCS - NSFOLLOWUPINSTRUCTIONS_ED_ALL_ED_FT
please follow up with your primary doctor for further anemia work up and follow up with Dr Merritt for the burning with urination.   please continue with your antibiotic as prescribed.   drink plenty of fluids.   take motrin and tylenol for pain.   return to ED for any concerns

## 2021-12-10 NOTE — ED STATDOCS - PROGRESS NOTE DETAILS
Pt informed of anemia. States hasnot had menses in years and cannot see her stools due to blindness. Rectal brown stool heme neg [id number] exp [id number]. qc pos. Pt informed re need to admit. Signed out to Dr. Espino pending ct abdomen. Bruna MUIR PI #[id number] - pt told of results.   pt states feels better.   pt denies any vomiting.   rectal exam negative by Cusati.   pt agrees with plan for d/c home with continuation of abx and will f/u with Dr Merritt and her PMD for f/u w/u of the anemia Pt informed of anemia. States has not had menses in years and cannot see her stools due to blindness. Rectal brown stool heme neg [id number] exp [id number]. qc pos. Pt informed re need to admit. Signed out to Dr. Espino pending ct abdomen. Bruna MUIR

## 2021-12-10 NOTE — ED STATDOCS - MDM PATIENT STATEMENT FOR ADDL TREATMENT
Received discharge instructions, ambulated to [facility]    Patient with one or more new problems requiring additional work-up/treatment.

## 2021-12-11 VITALS
HEART RATE: 83 BPM | OXYGEN SATURATION: 97 % | RESPIRATION RATE: 18 BRPM | TEMPERATURE: 98 F | DIASTOLIC BLOOD PRESSURE: 70 MMHG | SYSTOLIC BLOOD PRESSURE: 133 MMHG

## 2021-12-11 LAB — SARS-COV-2 RNA SPEC QL NAA+PROBE: SIGNIFICANT CHANGE UP

## 2021-12-11 PROCEDURE — 74177 CT ABD & PELVIS W/CONTRAST: CPT | Mod: 26,MA

## 2021-12-11 PROCEDURE — 71045 X-RAY EXAM CHEST 1 VIEW: CPT | Mod: 26

## 2021-12-11 NOTE — ED ADULT NURSE NOTE - CHIEF COMPLAINT QUOTE
c/o abdominal pain with anxiety, patient reports no bowel movements for past 5 days, c/o burning when urinating, was taking antibiotics for UTI 3 weeks ago, stopped taking medication and started again 2 days ago with no relief in symptoms, pain is worse today, denies fever or hematuria HX legal blind , patient spouse shu crain #[phone]

## 2021-12-11 NOTE — ED ADULT NURSE NOTE - NSICDXPASTSURGICALHX_GEN_ALL_CORE_FT
PAST SURGICAL HISTORY:  [place] filter in place left    H/O tubal ligation     History of cystoscopy history  nephrolithiasis and recurrent UTI S/P ESWL 5/2016    History of loop recorder     Proliferative diabetic retinopathy of left eye s/p PPV/laser/silicone  5/31/16 and 11/29/16 OS    S/P cataract surgery b/l    Shoulder disorder left

## 2021-12-12 LAB
CULTURE RESULTS: SIGNIFICANT CHANGE UP
SPECIMEN SOURCE: SIGNIFICANT CHANGE UP

## 2021-12-13 LAB
ALBUMIN SERPL ELPH-MCNC: 4.4 G/DL
ALP BLD-CCNC: 125 U/L
ALT SERPL-CCNC: 21 U/L
ANION GAP SERPL CALC-SCNC: 11 MMOL/L
APPEARANCE: CLEAR
APPEARANCE: CLEAR
AST SERPL-CCNC: 26 U/L
BACTERIA: NEGATIVE
BASOPHILS # BLD AUTO: 0.03 K/UL
BASOPHILS NFR BLD AUTO: 0.3 %
BILIRUB SERPL-MCNC: 0.2 MG/DL
BILIRUBIN URINE: NEGATIVE
BILIRUBIN URINE: NEGATIVE
BLOOD URINE: NEGATIVE
BLOOD URINE: NEGATIVE
BUN SERPL-MCNC: 8 MG/DL
CALCIUM SERPL-MCNC: 9.8 MG/DL
CHLORIDE SERPL-SCNC: 104 MMOL/L
CO2 SERPL-SCNC: 26 MMOL/L
COLOR: YELLOW
COLOR: YELLOW
CREAT SERPL-MCNC: 1.09 MG/DL
CRP SERPL-MCNC: 7 MG/L
EOSINOPHIL # BLD AUTO: 0.39 K/UL
EOSINOPHIL NFR BLD AUTO: 3.9 %
ERYTHROCYTE [SEDIMENTATION RATE] IN BLOOD BY WESTERGREN METHOD: 20 MM/HR
GLUCOSE QUALITATIVE U: ABNORMAL
GLUCOSE QUALITATIVE U: ABNORMAL
GLUCOSE SERPL-MCNC: 203 MG/DL
HCT VFR BLD CALC: 39.5 %
HGB BLD-MCNC: 12.4 G/DL
HYALINE CASTS: 0 /LPF
IMM GRANULOCYTES NFR BLD AUTO: 0.1 %
KETONES URINE: NEGATIVE
KETONES URINE: NEGATIVE
LEUKOCYTE ESTERASE URINE: NEGATIVE
LEUKOCYTE ESTERASE URINE: NEGATIVE
LYMPHOCYTES # BLD AUTO: 3.99 K/UL
LYMPHOCYTES NFR BLD AUTO: 40.3 %
MAN DIFF?: NORMAL
MCHC RBC-ENTMCNC: 30.8 PG
MCHC RBC-ENTMCNC: 31.4 GM/DL
MCV RBC AUTO: 98 FL
MICROSCOPIC-UA: NORMAL
MONOCYTES # BLD AUTO: 0.71 K/UL
MONOCYTES NFR BLD AUTO: 7.2 %
NEUTROPHILS # BLD AUTO: 4.78 K/UL
NEUTROPHILS NFR BLD AUTO: 48.2 %
NITRITE URINE: NEGATIVE
NITRITE URINE: NEGATIVE
PH URINE: 6.5
PH URINE: 6.5
PLATELET # BLD AUTO: 298 K/UL
POTASSIUM SERPL-SCNC: 4.6 MMOL/L
PROT SERPL-MCNC: 6.9 G/DL
PROTEIN URINE: NEGATIVE
PROTEIN URINE: NEGATIVE
RBC # BLD: 4.03 M/UL
RBC # FLD: 13.2 %
RED BLOOD CELLS URINE: 0 /HPF
SODIUM SERPL-SCNC: 141 MMOL/L
SPECIFIC GRAVITY URINE: 1
SPECIFIC GRAVITY URINE: 1
SQUAMOUS EPITHELIAL CELLS: 1 /HPF
UROBILINOGEN URINE: NORMAL
UROBILINOGEN URINE: NORMAL
WBC # FLD AUTO: 9.91 K/UL
WHITE BLOOD CELLS URINE: 3 /HPF

## 2022-03-21 ENCOUNTER — APPOINTMENT (OUTPATIENT)
Dept: CARDIOLOGY | Facility: CLINIC | Age: 62
End: 2022-03-21

## 2022-03-23 ENCOUNTER — APPOINTMENT (OUTPATIENT)
Dept: RHEUMATOLOGY | Facility: CLINIC | Age: 62
End: 2022-03-23

## 2022-03-24 ENCOUNTER — APPOINTMENT (OUTPATIENT)
Dept: RHEUMATOLOGY | Facility: CLINIC | Age: 62
End: 2022-03-24

## 2022-04-21 NOTE — ASU PATIENT PROFILE, ADULT - FALL HARM RISK CONCLUSION
Universal Safety Interventions
I have personally evaluated and examined the patient. The Attending was available to me as a supervising provider if needed.

## 2022-07-08 NOTE — ED STATDOCS - CARDIOVASCULAR NEGATIVE STATEMENT, MLM
no chest pain and no edema. Cartilage Graft Text: The defect edges were debeveled with a #15 scalpel blade.  Given the location of the defect, shape of the defect, the fact the defect involved a full thickness cartilage defect a cartilage graft was deemed most appropriate.  An appropriate donor site was identified, cleansed, and anesthetized. The cartilage graft was then harvested and transferred to the recipient site, oriented appropriately and then sutured into place.  The secondary defect was then repaired using a primary closure.

## 2022-07-22 NOTE — ED ADULT NURSE NOTE - NS TRANSFER PATIENT BELONGINGS
**06/06/2022 Pt has decided to go with Basic cataract surgery [facility] and requesting Imprimis dops. Carmina**. None

## 2022-10-31 NOTE — ED STATDOCS - NSTIMEPROVIDERCAREINITIATE_GEN_ER
Called and spoke with ENT specialist of [place].AJ has an appointment scheduled 11/10. I will follow up after to determine if biopsy was done.    15-Aug-2019 19:01

## 2023-02-21 NOTE — H&P ADULT - NSHPLABSRESULTS_GEN_ALL_CORE
code: trauma ~  Labs:                          12.0   13.67 )-----------( 313      ( 28 Mar 2020 11:17 )             36.1     03-28    132<L>  |  97  |  8   ----------------------------<  295<H>  4.3   |  24  |  0.97    Ca    8.3<L>      28 Mar 2020 11:17    TPro  7.0  /  Alb  2.7<L>  /  TBili  0.4  /  DBili  x   /  AST  38  /  ALT  29  /  AlkPhos  113  03-28    LIVER FUNCTIONS - ( 28 Mar 2020 11:17 )  Alb: 2.7 g/dL / Pro: 7.0 g/dL / ALK PHOS: 113 U/L / ALT: 29 U/L DA / AST: 38 U/L / GGT: x           PT/INR - ( 28 Mar 2020 11:17 )   PT: 13.2 sec;   INR: 1.18 ratio         PTT - ( 28 Mar 2020 11:17 )  PTT:26.5 sec      Active Medications  MEDICATIONS  (STANDING):  ascorbic acid 500 milliGRAM(s) Oral every 12 hours  enoxaparin Injectable 40 milliGRAM(s) SubCutaneous daily    MEDICATIONS  (PRN):  acetaminophen  Suppository .. 975 milliGRAM(s) Rectal every 6 hours PRN Temp greater or equal to 38C (100.4F), Mild Pain (1 - 3)  guaiFENesin   Syrup  (Sugar-Free) 200 milliGRAM(s) Oral every 6 hours PRN Cough

## 2023-04-03 NOTE — ASU PREOP CHECKLIST - MEDICAL/PEDIATRIC CLEARANCE ON MEDICAL RECORD
Name: Alida Beasley      : 1934      MRN: [id number]  Encounter Provider: Jeanette Kumari MD  Encounter Date: 4/3/2023   Enc[facility] department: [facility]    Assessment & Plan     1  UTI symptoms  Assessment & Plan:  Patient has 23 allergies listed, she reports she has tolerated this cefpodoxime in the past   We will start this, and send urine for culture  Pt requests 10 days of treatment  Orders:  -     cefpodoxime (VANTIN) 100 mg tablet; Take 1 tablet (100 mg total) by mouth 2 (two) times a day for 10 days    2  Thromboembolic disorder ([address] )    3  Atrial fibrillation, unspecified type (HCC)           Subjective     Onset 3 days ago of UTI symptoms  She started some AZO at home  She had some burning with urination, some lower mid abdominal pressure discomfort, frequency of urine, urgency of urine, no blood in the urine  No nausea or vomiting, no new flank pain, no fevers or chills patient has had similar symptoms in the past when she has had urinary tract infections  Review of Systems   Constitutional: Negative for chills and fever  Gastrointestinal: Negative for nausea and vomiting  Genitourinary: Positive for dysuria, frequency and urgency         Past Medical History:   Diagnosis Date   • Acute bronchitis 3/17/2022   • Arthritis    • Cardiac disease    • Hyperlipidemia    • Hypertension    • Hypertension, essential, benign 2018   • Ingrowing toenail with infection 2019   • Preop cardiovascular exam 2019   • Rash 6/3/2020   • Thrombocytopenia ([facility] [address] ) 11/10/2021    Added per ICD-10 guidelines--provider accepted   • UTI (urinary tract infection) 2017     Past Surgical History:   Procedure Laterality Date   • APPENDECTOMY     • CORONARY ANGIOPLASTY WITH STENT PLACEMENT     • CYSTOSCOPY  2014    Diagnostic   • HERNIA REPAIR     • JOINT REPLACEMENT      bilat knee, bilat hip, L shoulder   • KNEE SURGERY Bilateral    • SHOULDER SURGERY     • yes TOTAL HIP ARTHROPLASTY Bilateral      Family History   Problem Relation Age of Onset   • Breast cancer Mother 80   • Leukemia Mother    • Gout Mother    • Hypertension Mother    • Bone cancer Father 64   • Cervical cancer Sister 52   • Aneurysm Family    • Heart attack Family         myocardial infarction   • Rheum arthritis Family    • Lung cancer Sister 70   • No Known Problems Daughter    • No Known Problems Sister    • No Known Problems Daughter    • No Known Problems Daughter    • No Known Problems Son    • No Known Problems Maternal Aunt    • No Known Problems Maternal Aunt    • No Known Problems Maternal Aunt    • No Known Problems Paternal Aunt      Social History     Socioeconomic History   • Marital status:      Spouse name: None   • Number of children: 4   • Years of education: None   • Highest education level: None   Occupational History   • Occupation: Retired   Tobacco Use   • Smoking status: Never   • Smokeless tobacco: Never   Vaping Use   • Vaping Use: Never used   Substance and Sexual Activity   • Alcohol use: No   • Drug use: No   • Sexual activity: Not Currently   Other Topics Concern   • None   Social History Narrative    Caffeine use, active    Completed 8th grade    No caffeine use, active, per [employer]     Social Determinants of Health     Financial Resource Strain: Not on file   Food Insecurity: Not on file   Transportation Needs: Not on file   Physical Activity: Not on file   Stress: Not on file   Social Connections: Not on file   Intimate Partner Violence: Not on file   Housing Stability: Not on file     Current Outpatient Medications on File Prior to Visit   Medication Sig   • acetaminophen (TYLENOL) 650 mg CR tablet Take by mouth   • Ascorbic Acid (VITAMIN C) 1000 MG tablet Take 1,000 mg by mouth daily   • aspirin 81 MG tablet Take 81 mg by mouth daily   • calcium citrate-vitamin D (CITRACAL+D) 315-200 MG-UNIT per tablet Take 1 tablet by mouth 2 (two) times a day   • Cholecalciferol (VITAMIN D3) 1000 units CAPS Take 1 tablet by mouth daily   • famotidine (PEPCID) 20 mg tablet TAKE 1 TABLET(20 MG) BY MOUTH DAILY   • metoprolol tartrate (LOPRESSOR) 25 mg tablet TAKE 1 TABLET(25 MG) BY MOUTH EVERY 12 HOURS   • Multiple Vitamins-Minerals (CENTRUM ADULTS PO) Take 1 tablet by mouth daily   • nitroglycerin (NITROSTAT) 0 4 mg SL tablet PLACE 1 TABLET UNDER THE TONGUE EVERY 5 MINUTES AS NEEDED FOR CHEST PAIN UP TO 3 DOSES  CALL 911 IF PAIN PERSISTS   • simvastatin (ZOCOR) 40 mg tablet TAKE 1 TABLET(40 MG) BY MOUTH DAILY   • Xarelto 20 MG tablet TAKE 1 TABLET(20 MG) BY MOUTH DAILY WITH BREAKFAST     Allergies   Allergen Reactions   • Cephalexin Hives   • Ciprofloxacin Hives   • Cortisone Headache   • Cortizone-10 [Hydrocortisone] Headache   • Cyclophosphamide Hives   • Dexamethasone Hives   • Erythromycin Hives   • Hemophilus B Polysaccharide Vaccine Hives   • Influenza Virus Vaccine Hives   • Iodinated Casein    • Loratadine    • Macrobid  [Nitrofurantoin Monohyd Macro] Hives   • Motrin [Ibuprofen] Itching   • Mucinex Chest Congestion Child [Guaifenesin] Itching   • Niacin Itching   • Nitrofurantoin    • Omeprazole    • Oxycodone-Acetaminophen Other (See Comments)   • Penicillins    • Percocet  [Oxycodone-Acetaminophen]    • Pneumococcal Vac Polyvalent Hives   • Povidone Iodine    • Sulfa Antibiotics      There is no immunization history for the selected administration types on file for this patient  Objective     /78   Pulse 74   Temp 98 5 °F (36 9 °C) (Tympanic)   Wt 85 7 kg (189 lb)   SpO2 98%   BMI 35 71 kg/m²     Physical Exam  Constitutional:       General: She is not in acute distress  Appearance: Normal appearance  Abdominal:      Tenderness: There is no right CVA tenderness or left CVA tenderness  Neurological:      Mental Status: She is alert         Sharon Massey MD

## 2023-04-25 NOTE — ED STATDOCS - WR INTERPRETATION DATE TIME  1
[place] AMBULATORY ENCOUNTER  GI CLINIC NOTE    REQUESTING PROVIDER:  Fox Godinez MD    CHIEF COMPLAINT:  Consultation and Rectal Bleeding (X3 weeks)       SUBJECTIVE:    Arabella Chao is a 20 year old female seen today at the request of Fox Godinez MD for rectal bleeding.    First time having rectal bleeding - pinkish bright red color small amount. Intermittently for about 3 weeks. Associated with constipation and straining for stool. Denies abdominal pain. Denies family history of colon cancer or IBD.     MEDICATIONS:    Current Outpatient Medications   Medication Sig Dispense Refill   • hydroCORTisone (ANUSOL-HC) 25 MG suppository Place 1 suppository rectally in the morning and 1 suppository in the evening. Do all this for 14 days. 28 suppository 0   • hydroCORTisone (CORTIZONE) 2.5 % ointment Apply topically 2 times daily for 14 days. Apply in the rectum 20 g 0   • norelgestromin-ethinyl estradiol 150-35 MCG/24HR patch Place 1 patch onto the skin 1 day a week. 12 patch 5   • Elidel 1 % cream APPLY TOPICALLY TO THE AFFECTED AREA TWICE DAILY     • clobetasol (TEMOVATE) 0.05 % cream APPLY TOPICALLY TWICE DAILY FOR 2 WEEKS DURING FLARES     • ergocalciferol (DRISDOL) 1.25 mg (50,000 units) capsule Take 1 capsule by mouth 1 day a week. 12 capsule 5     No current facility-administered medications for this visit.       PROBLEM LIST:    There is no problem list on file for this patient.      HISTORIES:    ALLERGIES:  No Known Allergies  Past Medical History:   Diagnosis Date   • Insertion of Nexplanon 06/11/2021    REMOVED 9/3/2021 for Bleeding and Weight Gain     No past surgical history on file.  Social History     Tobacco Use   • Smoking status: Never   • Smokeless tobacco: Never     No family history on file.    REVIEW OF SYSTEMS:    Review of Systems   Constitutional: Negative.    HENT: Negative.    Eyes: Negative.    Respiratory: Negative.    Cardiovascular: Negative.    Gastrointestinal: Positive for anal  bleeding and constipation.   Genitourinary: Negative.    Musculoskeletal: Negative.    Skin: Negative.    Neurological: Negative.    Hematological: Negative.    Psychiatric/Behavioral: Negative.         PHYSICAL EXAM:    Vital Signs: Blood pressure 116/68, pulse (!) 102, resp. rate 16, weight 86.2 kg (190 lb), last menstrual period 03/21/2023, SpO2 98 %.  General: The patient is well-developed, well-nourished, in no acute distress, appears stated age.   Neurologic: Alert. Normal mood and affect, normal speech, no gross tremor.  Skin: Warm and dry, normal turgor.  Head: Normocephalic.  Eyes: Normal conjunctivae and sclerae.  Pupils equal, round, reactive to light. Extraocular movements intact.  HEENT: Oropharynx clear, moist mucous membranes, external nose is normal to inspection.  Neck: Symmetric without swelling or tenderness.   Respiratory: Respiratory effort normal.   Cardiovascular: Regular rate and rhythm.  Extremities: No swelling or tenderness.  Gastrointestinal:  Soft and non tender.  Normal bowel sounds.  No hepatomegaly or splenomegaly.     Rectal exam: no external hemorrhoids or fissure    LABORATORY DATA:    Lab Results   Component Value Date    WBC 3.6 (L) 12/07/2022    HCT 38.5 12/07/2022    HGB 12.7 12/07/2022     12/07/2022     Lab Results   Component Value Date    GLUCOSE 92 12/07/2022    SODIUM 137 12/07/2022    POTASSIUM 4.1 12/07/2022    CHLORIDE 101 12/07/2022    BUN 10 12/07/2022    CREATININE 0.66 12/07/2022    CALCIUM 9.0 12/07/2022    ANIONGAP 11 12/07/2022     TSH (mcUnits/mL)   Date Value   12/07/2022 1.687       ASSESSMENT & PLAN:    Arabella was seen today for consultation and rectal bleeding.    Diagnoses and all orders for this visit:    Hemorrhoids, internal, with bleeding  -     hydroCORTisone (ANUSOL-HC) 25 MG suppository; Place 1 suppository rectally in the morning and 1 suppository in the evening. Do all this for 14 days.  -     hydroCORTisone (CORTIZONE) 2.5 % ointment;  Apply topically 2 times daily for 14 days. Apply in the rectum    Constipation, unspecified constipation type      Bright red pink blood intermittently when having straining and constipation. Likely from internal hemorrhoids.  Will try hydrocortisone ointment or suppository BID for 2 weeks along with miralax (adjust dosage based on stool consistency). If still having bleeding after using hydrocortisone, patient will call us and will schedule colonoscopy to rule out other causes.    Instructions provided as documented in the after visit summary.  The patient indicated understanding of the diagnosis and agreed with the plan of care.   A copy of this note was sent to the referring provider, thank you for involving me in the care of this patient.     Roberto Carlos Frazier MD     Cc: Pcp, No   16-Aug-2021 16:19

## 2023-05-13 NOTE — ED ADULT TRIAGE NOTE - ACCOMPANIED BY
-requiring 3L NC oxygen to maintain SpO2>92%  -Lung sounds with bilateral upper wheezing, not resolved with racemic epinephrine or lasix  -CT chest with moderate left pleural effusion, small right pleural effusion  -continue scheduled Xopenex neb treatments  -wean oxygen as tolerated EMT/paramedic

## 2023-06-18 NOTE — ED ADULT NURSE NOTE - IS THE PATIENT ABLE TO BE SCREENED?
[facility]   HISTORY AND PHYSICAL      Patient: Wilson Mao Date: 2023   male, 65 year old  Admit Date: 2023   Attending: Rohit Campbell MD;An*        PRIMARY CARE PROVIDER:  Lalo Lainez DO     ATTENDING PHYSICIAN    Rohit Campbell MD;An*      CHIEF COMPLAINT    Patient came in for shortness of bread dizziness and chest pain that started an hour ago    HPI    Wilson Mao is a 65 year old male who presents to the hospital with complains of shortness of breath, dizziness and chest pain that started an hour ago.  Had blurred vision and headache earlier but it went away.  Significant cardiac history and history of stroke.  Patient had a presyncopal episode before he came in.  Patient's son who was present in the emergency department told the ER doctor that patient's left side of the face looked different to him but got better.    PAST MEDICAL HISTORY    Past Medical History:   Diagnosis Date   • Coronary artery disease    • High cholesterol    • Stroke (CMD)        SURGICAL HISTORY    Past Surgical History:   Procedure Laterality Date   • Other surgical history N/A     oral surgery- dentures   • Ptca N/A 2016    cardiac cath       FAMILY HISTORY    Family History   Problem Relation Age of Onset   • Cancer Mother    • Early death Father 38        Heart attack   • Heart disease Father         heart attack       SOCIAL HISTORY    Social History     Tobacco Use   • Smoking status: Former     Current packs/day: 0.00     Types: Cigarettes     Start date: 3/10/1969     Quit date: 3/9/2016     Years since quittin.2   • Smokeless tobacco: Never   Vaping Use   • Vaping Use: never used   Substance Use Topics   • Alcohol use: Yes     Alcohol/week: 12.0 standard drinks of alcohol     Types: 12 Cans of beer per week     Comment: beer 2-3 times per weeks 4-6 beers per time   • Drug use: No       CURRENT MEDICATIONS    No outpatient medications have been marked as taking for  the 23 encounter (Hospital Encounter).        ALLERGIES    ALLERGIES:  No Known Allergies    REVIEW OF SYSTEMS    All 13 Review of Systems negative except for what's listed in the HPI.      PHYSICAL EXAM    Vital 24 Hour Range Most Recent Value   Temperature Temp  Min: 97.6 °F (36.4 °C)  Max: 97.6 °F (36.4 °C) 97.6 °F (36.4 °C)   Pulse Pulse  Min: 74  Max: 90 74   Respiratory Resp  Min: 16  Max: 20 20   Blood Pressure BP  Min: 117/75  Max: 167/84 120/76   Pulse Oximetry SpO2  Min: 92 %  Max: 98 % 96 %   Art. BP No data recorded     O2 O2 Flow Rate (L/min)  Av L/min  Min: 0 L/min   Min taken time: 23  Max: 0 L/min   Max taken time: 23       Vital Most Recent Value First Value   Weight 100.2 kg (220 lb 14.4 oz) Weight: 100.2 kg (220 lb 14.4 oz)   Height       BMI   N/A       Examination:    Neurologic:  Alert and oriented x 3    Cranial nerves 2-12 grossly intact  HEENT: Pupils equal and Pupils reactive to light  Neck:  Supple and Non-tender  Chest:  Symmetric  Lungs:  Clear to auscultation bilaterally  Heart:  Regular rate and rhythm  Abdomen: Soft, Bowel sounds present and Nontender  Extremities: cyanosis absent    Edema absent  Skin:  No evidence of rash      LABS    Recent Labs   Lab 23   SODIUM 137  --    POTASSIUM 3.8  --    CHLORIDE 105  --    CO2 28  --    BUN 25*  --    CREATININE 1.08 1.20*   GLUCOSE 118*  --    WBC 7.5  --    HGB 14.4  --    HCT 43.7  --      --    PT 10.3  --    INR 1.0  --    PTT 25  --      No results found  No results found for this or any previous visit.   No results found for this or any previous visit.  Lab Results   Component Value Date    USPG 1.013 2016    UPROT NEGATIVE 2016    UWBC NEGATIVE 2016    URBC TRACE (A) 2016    UPH 6.0 2016    UBACTR NONE SEEN 2016       IMAGING & OTHER STUDIES    XR CHEST PA OR AP 1 VIEW    Result Date: 2023  Narrative: XR CHEST AP OR PA HISTORY:   Chest pain COMPARISON:  April 19, 2023 TECHNIQUE:  1 frontal view     Impression: FINDINGS/IMPRESSION:  Low inspiratory volume. Median sternotomy changes. The heart is mildly enlarged, similar to prior exam. Pulmonary vessels are normally distributed. No focal pulmonary consolidation, pleural effusion or pneumothorax. Multiple surgical clips project over the medial aspect of the left lung. Electronically Signed by: Brittany Stockton MD Signed on: 6/17/2023 9:59 PM Workstation ID: [id number]    CT HEAD LEVEL 1    Result Date: 6/17/2023  Narrative: CT HEAD LEVEL 1 - LEVEL 1 CLINICAL INFORMATION:  Acute Stroke Alert presenting with Neuro deficit, acute, stroke suspected. COMPARISON:  CT head without contrast 12/6/2022. TECHNIQUE:  Routine noncontrast head CT spanning cranial vertex through foramen magnum.  Coronal and sagittal reformats were generated and reviewed. FINDINGS:  No acute intracranial hemorrhage. Multifocal right MCA territory chronic infarcts in unchanged distribution compared to prior exam. Unchanged isolated left MCA territory chronic infarct. No evidence of new regional hypodensity to suggest an evolving vascular insult in a major vascular territory.  MRI is more sensitive for detection of acute ischemia. No evidence of hyperdense intravascular thrombosis. No pathologic extra-axial fluid collection, mass effect, or midline shift identified.  Mild age-appropriate prominence of the ventricles and sulci. Mild patchy supratentorial white matter hypoattenuation is nonspecific but typically ascribed to chronic microvascular ischemic changes in a patient of this age.  Tiny mucous retention cyst in the base of the right maxillary sinus. The imaged paranasal sinuses are otherwise clear.  The mastoid air cells are clear.  The osseous structures are unremarkable.     Impression: IMPRESSION:  1.  No acute intracranial abnormality. 2.  Chronic bilateral MCA territory infarcts. Findings were called immediately to  BETSY VILCHIS MD on 6/17/2023 9:02 PM. I, Attending Radiologist Brittany Stockton MD, have reviewed the images and report and concur with these findings interpreted by Resident Radiologist, Antoine Collado MD. Electronically Signed by: Brittany Stockton MD Signed on: 6/17/2023 9:37 PM Workstation ID: [id number]    CTA HEAD AND NECK W CONTRAST LEVEL 1    Result Date: 6/17/2023  Narrative: EXAMINATION: CTA HEAD AND NECK W CONTRAST LEVEL 1 CLINICAL INDICATION:  Neuro deficit, acute, stroke suspected. COMPARISON:  CT head 6/17/2023. CTA head/neck 12/5/2022. TECHNIQUE:  Using a multidetector, multislice helical scanner, routine CTA of the intracranial and extracranial circulation after administration of 60 cc of Omnipaque-350 intravenously.  Subsequently, venous phase CT head was performed with standard technique.  MIP reconstructions are rendered and archived to PACS. Artificial intelligence large vessel occlusion detection was applied to the CTA data. Determination of the degree of ICA stenosis is obtained using measurements of distal ICA diameter as the denominator for stenosis measurement.  The method utilized is similar to that utilized in the [employer] American Symptomatic Carotid Endarterectomy (NASCET) and/or WASID (Warfarin vs. Aspirin Symptomatic Intracranial Disease) trials.  If the degree of stenosis is greater than 30%, the actual percentage stenosis is given in the body of the report. FINDINGS: CT HEAD WITH CONTRAST:  No pathologic intracranial enhancement.  The major dural venous sinuses appear appropriately opacified. CTA NECK: Aortic Arch:  Patent without significant atherosclerosis.  The left vertebral artery arises directly off the aortic arch (normal variant). Brachiocephalic Artery:  Patent. Right Subclavian Artery:  Patent. Left Subclavian Artery:  Patent. Right Common Carotid:  Patent. Right Internal Carotid:  Mild atherosclerosis of the carotid bulb and proximal cervical internal carotid artery without  hemodynamically significant stenosis (less than 30% narrowing by NASCET criteria). Ulcerated plaque is again noted within the proximal cervical internal carotid artery. Right External Carotid:  Patent. Left Common Carotid:  Patent. Left Internal Carotid:  Mild atherosclerosis of the carotid bulb and proximal cervical internal carotid artery without hemodynamically significant stenosis (less than 30% narrowing by NASCET criteria). Left External Carotid:  Patent. Vertebral dominance:  Right. Right Vertebral (V1-V3):  Patent origin.  Patent cervical segment. Left Vertebral (V1-V3):  Patent origin.  Patent cervical segment. Lung apices:  Clear. Osseous Structures:  Cervical spondylosis. Median sternotomy wires. Additional Findings:  Dunlow tonsilliths. CABG. CTA HEAD: ANTERIOR CIRCULATION: Right ICA:  Patent. Right MCA:  Patent. Right MARILIN:  Patent. Left ICA:  Patent. Left MCA:  Patent. Left MARILIN:  Patent. POSTERIOR CIRCULATION: Distal Right Vertebral artery (V4):  Patent. Distal Left Vertebral artery (V4):  Nondominant left vertebral artery becomes mildly diminutive distal to the PICA origin (normal variant). Basilar Artery:  Patent. Right PCA:  Patent. Left PCA:  Arises as fetal origin via a prominent patent posterior communicating artery with a hypoplastic/aplastic P1 segment (normal variant). PICA/AICA/SCA:  Dominant posterior inferior cerebellar arteries with diminutive anterior inferior cerebellar arteries.  Patent superior cerebellar arteries. COLLATERAL CIRCULATION: Anterior communicator:  Patent. Right Posterior communicator:  Not well visualized, may be markedly diminutive or absent. Left Posterior communicator:  Patent, giving rise to fetal origin of ipsilateral posterior cerebral artery (normal anatomic variant).     Impression: IMPRESSION: CTA NECK:  1.  No hemodynamically significant extracranial stenosis, occlusion or dissection. 2.  Ulcerated plaque is again noted within the right proximal cervical  internal carotid artery with less than 30% narrowing. CTA HEAD:  1.  No hemodynamically significant intracranial vascular stenosis, large vessel occlusion or aneurysm. I, Attending Radiologist Darrion Beltran DO, have reviewed the images and report and concur with these findings interpreted by Resident Radiologist, Antoine Collado MD. Electronically Signed by: Darrion Beltran DO Signed on: 6/17/2023 9:30 PM Workstation ID: [id number]       Assessment/Plan:     · Stroke-like symptoms - initiate stroke protocol.  NIH score is 0 at this time.  CT head negative.  Will get MRI of the brain.   Patient has prior chronic bilateral MCA territory infarcts.  Monitor patient on tele.  Patient sees Dr. Hawk as an outpatient.  He has a tremor.  Also has headaches.  Prior stroke he had was in right MCA territory.  It involved the right frontal lobe, insula and temporal lobe.  Has been seen by Stroke Neurology in the past.  On aspirin and Brilinta for stroke prevention.  Plavix nonresponder.  Tele stroke is seeing the patient.  Get an MRI.  · Ischemic cardiomyopathy - he came in with some chest pain.  Will trend troponins.  Currently on aspirin Coreg and Brilinta along with statin.  Recent negative stress test.  Continue to monitor.  Repeat echo.  · Essential hypertension - continue to monitor blood pressures closely during hospital stay.      · DVT Prophylaxis - Lovenox          Tentative Discharge/Disposition: Home         Assign to Observation Status  Patient is being brought in for care that spans less than two consecutive midnights, starting from the time care was initiated at this facility under observational status.    Code Status:  Prior      Luis Ocampo MD, MPH    6/17/2023    11:06 PM   Yes

## 2023-09-05 NOTE — H&P ADULT - NSHPLABSRESULTS_GEN_ALL_CORE
Cardiac PET 2023: abnormal perfusion defects noted within the mid to distal inferior wall and inferior apex    Echo: 2023: EF : 60-65%, Trace MR,mild TR    [id number]     CTA coronary  : 2023: calcium score :71, Motion artifact, mid and distal segments of RCA

## 2023-09-05 NOTE — ASU PATIENT PROFILE, ADULT - CAREGIVER RELATION TO PATIENT
Pt sitting in bed, daughter at bedside. Pt states that she fell earlier today, was checked out by EMS and decided to not come to the ED. Now pt is experiencing increased pain to right shoulder and left knee. In no visible distress at this time.    daughter

## 2023-09-05 NOTE — ASU PATIENT PROFILE, ADULT - MEDICATIONS TO HOLD
Instructed to hold Metformin starting this evening, Instructed to take 1/2 humalog this evening and hold in the morning

## 2023-09-05 NOTE — H&P ADULT - REASON FOR ADMISSION
Cardiac PET abnormal perfusion defects noted within the mid to distal inferior wall and inferior apex , plan for C

## 2023-09-05 NOTE — ASU PATIENT PROFILE, ADULT - MEDICATIONS TO TAKE
May take other medications as prescribed with sips of water (will talk aspirin as prescribed by Dr Banuelos's office)

## 2023-09-05 NOTE — ASU PATIENT PROFILE, ADULT - FALL HARM RISK - RISK INTERVENTIONS

## 2023-09-05 NOTE — H&P ADULT - ASSESSMENT
This is 63 year old female with PMH of HTN, HLD, IDDM, anxiety presented to cardiology with chest pain  cardiac work up completed ,  Cardiac PET abnormal perfusion defects noted within the mid to distal inferior wall and inferior apex , ca score of 71 . Referred for C   ASA class:  Creatinine:  GFR:  Bleeding  Risk score:  Elias Score:  This is 63 year old female with PMH of HTN, HLD, IDDM, anxiety presented to cardiology with chest pain  cardiac work up completed ,  Cardiac PET abnormal perfusion defects noted within the mid to distal inferior wall and inferior apex , ca score of 71 . Referred for C     ASA class: II  Creatinine: 0.9  GFR: 63.44  Bleeding  Risk score: 1.2  Elias Score: 4 points  Pt. pre-hydrated with sodium chloride 0.9% 250cc bolus IV x1

## 2023-09-05 NOTE — H&P ADULT - HISTORY OF PRESENT ILLNESS
This is 63 year old female with PMH of HTN, HLD, IDDM, anxiety presented to cardiology with chest pain  cardiac work up completed ,  Cardiac PET abnormal perfusion defects noted within the mid to distal inferior wall and inferior apex , ca score of 71 . Referred for [facility]

## 2023-09-05 NOTE — H&P ADULT - PROBLEM SELECTOR PLAN 1
Cardiac PET: abnormal perfusion defects noted within the mid to distal inferior wall and inferior apex     plan for left cardiac cath for ischemia work up     -Consent obtained for cardiac catheterization w/ coronary angiogram and possible stent placement, with possible sedation and analgia. Pt is competent, has capacity, and understands risks and benefits of procedure. Risks and benefits discussed. Risk discussed included, but not limited to MI, stroke, mortality, major bleeding, arrythmia, or infection. All questions answered

## 2023-09-06 ENCOUNTER — OUTPATIENT (OUTPATIENT)
Dept: OUTPATIENT SERVICES | Facility: HOSPITAL | Age: 63
LOS: 1 days | Discharge: HOME | End: 2023-09-06
Payer: MEDICAID

## 2023-09-06 VITALS
HEIGHT: 61 IN | SYSTOLIC BLOOD PRESSURE: 152 MMHG | OXYGEN SATURATION: 99 % | WEIGHT: 158.07 LBS | HEART RATE: 64 BPM | DIASTOLIC BLOOD PRESSURE: 74 MMHG | RESPIRATION RATE: 16 BRPM | TEMPERATURE: 98 F

## 2023-09-06 DIAGNOSIS — M25.9 JOINT DISORDER, UNSPECIFIED: Chronic | ICD-10-CM

## 2023-09-06 DIAGNOSIS — Z01.810 ENCOUNTER FOR PREPROCEDURAL CARDIOVASCULAR EXAMINATION: ICD-10-CM

## 2023-09-06 DIAGNOSIS — R94.39 ABNORMAL RESULT OF OTHER CARDIOVASCULAR FUNCTION STUDY: ICD-10-CM

## 2023-09-06 DIAGNOSIS — Z98.890 OTHER SPECIFIED POSTPROCEDURAL STATES: Chronic | ICD-10-CM

## 2023-09-06 DIAGNOSIS — Z95.828 PRESENCE OF OTHER VASCULAR IMPLANTS AND GRAFTS: Chronic | ICD-10-CM

## 2023-09-06 DIAGNOSIS — Z98.49 CATARACT EXTRACTION STATUS, UNSPECIFIED EYE: Chronic | ICD-10-CM

## 2023-09-06 DIAGNOSIS — E11.3592 TYPE 2 DIABETES MELLITUS WITH PROLIFERATIVE DIABETIC RETINOPATHY WITHOUT MACULAR EDEMA, LEFT EYE: Chronic | ICD-10-CM

## 2023-09-06 DIAGNOSIS — Z98.51 TUBAL LIGATION STATUS: Chronic | ICD-10-CM

## 2023-09-06 LAB
GLUCOSE BLDC GLUCOMTR-MCNC: 138 MG/DL — HIGH (ref 70–99)
GLUCOSE BLDC GLUCOMTR-MCNC: 208 MG/DL — HIGH (ref 70–99)
GLUCOSE BLDC GLUCOMTR-MCNC: 326 MG/DL — HIGH (ref 70–99)

## 2023-09-06 PROCEDURE — C1769: CPT

## 2023-09-06 PROCEDURE — 93005 ELECTROCARDIOGRAM TRACING: CPT

## 2023-09-06 PROCEDURE — 86850 RBC ANTIBODY SCREEN: CPT

## 2023-09-06 PROCEDURE — C1874: CPT

## 2023-09-06 PROCEDURE — 36415 COLL VENOUS BLD VENIPUNCTURE: CPT

## 2023-09-06 PROCEDURE — 93010 ELECTROCARDIOGRAM REPORT: CPT

## 2023-09-06 PROCEDURE — 86901 BLOOD TYPING SEROLOGIC RH(D): CPT

## 2023-09-06 PROCEDURE — C1725: CPT

## 2023-09-06 PROCEDURE — 93458 L HRT ARTERY/VENTRICLE ANGIO: CPT | Mod: 59

## 2023-09-06 PROCEDURE — C1894: CPT

## 2023-09-06 PROCEDURE — 92978 ENDOLUMINL IVUS OCT C 1ST: CPT | Mod: LD

## 2023-09-06 PROCEDURE — 86870 RBC ANTIBODY IDENTIFICATION: CPT

## 2023-09-06 PROCEDURE — C1753: CPT

## 2023-09-06 PROCEDURE — 80048 BASIC METABOLIC PNL TOTAL CA: CPT

## 2023-09-06 PROCEDURE — 93799 UNLISTED CV SVC/PROCEDURE: CPT

## 2023-09-06 PROCEDURE — 86900 BLOOD TYPING SEROLOGIC ABO: CPT

## 2023-09-06 PROCEDURE — 82962 GLUCOSE BLOOD TEST: CPT

## 2023-09-06 PROCEDURE — C9600: CPT | Mod: LD

## 2023-09-06 PROCEDURE — C1887: CPT

## 2023-09-06 PROCEDURE — 85025 COMPLETE CBC W/AUTO DIFF WBC: CPT

## 2023-09-06 PROCEDURE — 83036 HEMOGLOBIN GLYCOSYLATED A1C: CPT

## 2023-09-06 PROCEDURE — 86880 COOMBS TEST DIRECT: CPT

## 2023-09-06 RX ORDER — PREDNISOLONE SODIUM PHOSPHATE 1 %
1 DROPS OPHTHALMIC (EYE)
Refills: 0 | Status: DISCONTINUED | OUTPATIENT
Start: 2023-09-06 | End: 2023-09-07

## 2023-09-06 RX ORDER — BESIFLOXACIN 6 MG/ML
1 SUSPENSION OPHTHALMIC
Qty: 0 | Refills: 0 | DISCHARGE

## 2023-09-06 RX ORDER — NEPAFENAC 3 MG/ML
1 SUSPENSION OPHTHALMIC
Qty: 0 | Refills: 0 | DISCHARGE

## 2023-09-06 RX ORDER — INSULIN LISPRO 100/ML
VIAL (ML) SUBCUTANEOUS AT BEDTIME
Refills: 0 | Status: DISCONTINUED | OUTPATIENT
Start: 2023-09-06 | End: 2023-09-07

## 2023-09-06 RX ORDER — CLOPIDOGREL BISULFATE 75 MG/1
75 TABLET, FILM COATED ORAL DAILY
Refills: 0 | Status: DISCONTINUED | OUTPATIENT
Start: 2023-09-07 | End: 2023-09-07

## 2023-09-06 RX ORDER — DEXTROSE 50 % IN WATER 50 %
25 SYRINGE (ML) INTRAVENOUS ONCE
Refills: 0 | Status: DISCONTINUED | OUTPATIENT
Start: 2023-09-06 | End: 2023-09-07

## 2023-09-06 RX ORDER — OMEPRAZOLE 10 MG/1
1 CAPSULE, DELAYED RELEASE ORAL
Qty: 0 | Refills: 0 | DISCHARGE

## 2023-09-06 RX ORDER — LISINOPRIL 2.5 MG/1
1 TABLET ORAL
Qty: 0 | Refills: 0 | DISCHARGE

## 2023-09-06 RX ORDER — CYCLOBENZAPRINE HYDROCHLORIDE 10 MG/1
1 TABLET, FILM COATED ORAL
Qty: 0 | Refills: 0 | DISCHARGE

## 2023-09-06 RX ORDER — GABAPENTIN 400 MG/1
1 CAPSULE ORAL
Qty: 0 | Refills: 0 | DISCHARGE

## 2023-09-06 RX ORDER — ASPIRIN/CALCIUM CARB/MAGNESIUM 324 MG
81 TABLET ORAL DAILY
Refills: 0 | Status: DISCONTINUED | OUTPATIENT
Start: 2023-09-06 | End: 2023-09-07

## 2023-09-06 RX ORDER — ASCORBIC ACID 60 MG
500 TABLET,CHEWABLE ORAL DAILY
Refills: 0 | Status: DISCONTINUED | OUTPATIENT
Start: 2023-09-06 | End: 2023-09-07

## 2023-09-06 RX ORDER — DEXTROSE 50 % IN WATER 50 %
15 SYRINGE (ML) INTRAVENOUS ONCE
Refills: 0 | Status: DISCONTINUED | OUTPATIENT
Start: 2023-09-06 | End: 2023-09-07

## 2023-09-06 RX ORDER — PANTOPRAZOLE SODIUM 20 MG/1
40 TABLET, DELAYED RELEASE ORAL
Refills: 0 | Status: DISCONTINUED | OUTPATIENT
Start: 2023-09-06 | End: 2023-09-07

## 2023-09-06 RX ORDER — GLUCAGON INJECTION, SOLUTION 0.5 MG/.1ML
1 INJECTION, SOLUTION SUBCUTANEOUS ONCE
Refills: 0 | Status: DISCONTINUED | OUTPATIENT
Start: 2023-09-06 | End: 2023-09-07

## 2023-09-06 RX ORDER — DOCUSATE SODIUM 100 MG
1 CAPSULE ORAL
Qty: 0 | Refills: 0 | DISCHARGE

## 2023-09-06 RX ORDER — ATORVASTATIN CALCIUM 80 MG/1
20 TABLET, FILM COATED ORAL AT BEDTIME
Refills: 0 | Status: DISCONTINUED | OUTPATIENT
Start: 2023-09-06 | End: 2023-09-07

## 2023-09-06 RX ORDER — INSULIN LISPRO 100/ML
6 VIAL (ML) SUBCUTANEOUS
Refills: 0 | Status: DISCONTINUED | OUTPATIENT
Start: 2023-09-06 | End: 2023-09-07

## 2023-09-06 RX ORDER — DORZOLAMIDE HYDROCHLORIDE TIMOLOL MALEATE 20; 5 MG/ML; MG/ML
1 SOLUTION/ DROPS OPHTHALMIC
Refills: 0 | Status: DISCONTINUED | OUTPATIENT
Start: 2023-09-06 | End: 2023-09-07

## 2023-09-06 RX ORDER — LISINOPRIL 2.5 MG/1
5 TABLET ORAL DAILY
Refills: 0 | Status: DISCONTINUED | OUTPATIENT
Start: 2023-09-06 | End: 2023-09-07

## 2023-09-06 RX ORDER — INSULIN LISPRO 100/ML
VIAL (ML) SUBCUTANEOUS
Refills: 0 | Status: DISCONTINUED | OUTPATIENT
Start: 2023-09-06 | End: 2023-09-07

## 2023-09-06 RX ORDER — SODIUM CHLORIDE 9 MG/ML
1000 INJECTION INTRAMUSCULAR; INTRAVENOUS; SUBCUTANEOUS
Refills: 0 | Status: DISCONTINUED | OUTPATIENT
Start: 2023-09-06 | End: 2023-09-07

## 2023-09-06 RX ORDER — GABAPENTIN 400 MG/1
600 CAPSULE ORAL
Refills: 0 | Status: DISCONTINUED | OUTPATIENT
Start: 2023-09-06 | End: 2023-09-07

## 2023-09-06 RX ORDER — QUETIAPINE FUMARATE 200 MG/1
25 TABLET, FILM COATED ORAL AT BEDTIME
Refills: 0 | Status: DISCONTINUED | OUTPATIENT
Start: 2023-09-06 | End: 2023-09-07

## 2023-09-06 RX ORDER — SODIUM CHLORIDE 9 MG/ML
1000 INJECTION, SOLUTION INTRAVENOUS
Refills: 0 | Status: DISCONTINUED | OUTPATIENT
Start: 2023-09-06 | End: 2023-09-07

## 2023-09-06 RX ORDER — DEXTROSE 50 % IN WATER 50 %
12.5 SYRINGE (ML) INTRAVENOUS ONCE
Refills: 0 | Status: DISCONTINUED | OUTPATIENT
Start: 2023-09-06 | End: 2023-09-07

## 2023-09-06 RX ORDER — ACETAMINOPHEN 500 MG
650 TABLET ORAL EVERY 6 HOURS
Refills: 0 | Status: DISCONTINUED | OUTPATIENT
Start: 2023-09-06 | End: 2023-09-07

## 2023-09-06 RX ORDER — SULFASALAZINE 500 MG
1 TABLET ORAL
Qty: 0 | Refills: 0 | DISCHARGE

## 2023-09-06 RX ORDER — SODIUM CHLORIDE 9 MG/ML
250 INJECTION INTRAMUSCULAR; INTRAVENOUS; SUBCUTANEOUS ONCE
Refills: 0 | Status: COMPLETED | OUTPATIENT
Start: 2023-09-06 | End: 2023-09-06

## 2023-09-06 RX ADMIN — Medication 1 DROP(S): at 18:08

## 2023-09-06 RX ADMIN — ATORVASTATIN CALCIUM 20 MILLIGRAM(S): 80 TABLET, FILM COATED ORAL at 21:00

## 2023-09-06 RX ADMIN — Medication 81 MILLIGRAM(S): at 15:28

## 2023-09-06 RX ADMIN — SODIUM CHLORIDE 250 MILLILITER(S): 9 INJECTION INTRAMUSCULAR; INTRAVENOUS; SUBCUTANEOUS at 08:15

## 2023-09-06 RX ADMIN — GABAPENTIN 600 MILLIGRAM(S): 400 CAPSULE ORAL at 21:00

## 2023-09-06 RX ADMIN — Medication 6 UNIT(S): at 16:56

## 2023-09-06 RX ADMIN — GABAPENTIN 600 MILLIGRAM(S): 400 CAPSULE ORAL at 16:19

## 2023-09-06 RX ADMIN — Medication 500 MILLIGRAM(S): at 18:08

## 2023-09-06 RX ADMIN — PANTOPRAZOLE SODIUM 40 MILLIGRAM(S): 20 TABLET, DELAYED RELEASE ORAL at 15:28

## 2023-09-06 RX ADMIN — Medication 650 MILLIGRAM(S): at 21:02

## 2023-09-06 RX ADMIN — QUETIAPINE FUMARATE 25 MILLIGRAM(S): 200 TABLET, FILM COATED ORAL at 21:01

## 2023-09-06 RX ADMIN — SODIUM CHLORIDE 140 MILLILITER(S): 9 INJECTION INTRAMUSCULAR; INTRAVENOUS; SUBCUTANEOUS at 10:42

## 2023-09-06 RX ADMIN — Medication 4: at 16:58

## 2023-09-06 RX ADMIN — Medication 650 MILLIGRAM(S): at 22:00

## 2023-09-06 RX ADMIN — LISINOPRIL 5 MILLIGRAM(S): 2.5 TABLET ORAL at 15:28

## 2023-09-06 NOTE — PROGRESS NOTE ADULT - ASSESSMENT
63 year old female with PMH of HTN, HLD, IDDM, anxiety presented to cardiology with chest pain  cardiac work up completed ,  Cardiac PET abnormal perfusion defects noted within the mid to distal inferior wall and inferior apex , ca score of 71 . Referred for [facility]  (05 Sep 2023 13:28)      #CAD  - s/p JASPER to LAD  -Admit to TELE  -IV hydration per NAVNEET protocol   -VS, lab, diet and activity per PCI post orders  - c.w home meds  - ISS while hospitialized   - start Plavix 75mg daily   -hold metformin for 48 hrs post cath  -f/u EKG in AM, AM Labs  -Discussed therapeutic lifestyle changes to reduce risk factors such as following a cardiac diet, weight loss, maintaining a healthy weight, exercise, smoking cessation, medication compliance, and regular follow-up  with PCP/Cardioloigst  - stent card given to patient/family by RN in recovery room   -Qualified for cardiac rehab. resfused at this time   --Post cath instructions reviewed, post sedation instructions reviewed,  patient verbalizes and understands instructions  -plan discussed with patient, RN and Dr Banuelos   - if patient remains stable overnight, likely d/c in AM         63 year old female with PMH of HTN, HLD, IDDM, anxiety presented to cardiology with chest pain  cardiac work up completed ,  Cardiac PET abnormal perfusion defects noted within the mid to distal inferior wall and inferior apex , ca score of 71 . Referred for [facility]  (05 Sep 2023 13:28)      #CAD  - s/p JASPER to LAD  -Admit to TELE  -IV hydration per NAVNEET protocol   -VS, lab, diet and activity per PCI post orders  - c.w home meds  - ISS while hospitialized   - start Plavix 75mg daily   -hold metformin for 48 hrs post cath  -f/u EKG in AM, AM Labs  -Discussed therapeutic lifestyle changes to reduce risk factors such as following a cardiac diet, weight loss, maintaining a healthy weight, exercise, smoking cessation, medication compliance, and regular follow-up  with PCP/Cardioloigst  - stent card given to patient/family by RN in recovery room - copy on chart   -Qualified for cardiac rehab. referral sent to [facility]   --Post cath instructions reviewed, post sedation instructions reviewed,  patient verbalizes and understands instructions  -plan discussed with patient, RN and Dr Banuelos   - if patient remains stable overnight, likely d/c in AM

## 2023-09-06 NOTE — PACU DISCHARGE NOTE - COMMENTS
Patient s/p [facility] with PCI to LAD via right radial artery. Pressure dressing to RUE. No s/s of bleeding or hematoma. RUE warm and mobile. VS Stable. Patient denies pain. NS at 140 cc/hr x 4 hour post cardiac cath. Report given to CITLALY Delong on 3E. Patient placed on Zoll monitor and pending transport to  at this time

## 2023-09-06 NOTE — CHART NOTE - NSCHARTNOTEFT_GEN_A_CORE
[facility] Site Check      s/p [facility] sofy to LAD   status post cath via RRA.  Site clean, dry, intact. Pulses present, capillary refill appropriate.  no signs of hematoma present, surrounding area soft. VSS no c/o pain. Patient resting comfortably in bed.     -Post cath instructions reviewed, post sedation instructions reviewed  -Discussed therapeutic lifestyle changes to reduce risk factors such as following a cardiac diet, weight loss, maintaining a healthy weight, exercise, smoking cessation, medication compliance, and regular follow-up  with PCP/Cardioloigst  - Patient aware to take DAPT  as prescribed and DO NOT STOP taking without consulting cardiologist first or STENT/s WILL CLOSE  - Patient verbalizes understanding of ALL OF THE ABOVE, and gives positive feedback     Cath Lab team to follow in AM

## 2023-09-06 NOTE — CHART NOTE - NSCHARTNOTEFT_GEN_A_CORE
Called by RN to evaluate pt with chest pain. Patient states that she had 4/10 dull left side chest pain that last for only few minutes.  No active chest pain now. Denies SOB, wheezing, cough, fever or chills, jaw pain, n/v    Vital Signs Last 24 Hrs  T(C): 36.3 (06 Sep 2023 20:47), Max: 36.6 (06 Sep 2023 07:26)  T(F): 97.3 (06 Sep 2023 20:47), Max: 97.8 (06 Sep 2023 07:26)  HR: 69 (06 Sep 2023 20:47) (64 - 78)  BP: 125/59 (06 Sep 2023 20:47) (104/52 - 152/74)  RR: 17 (06 Sep 2023 20:47) (16 - 17)  SpO2: 95% (06 Sep 2023 20:47) (95% - 100%)    Parameters below as of 06 Sep 2023 20:47  Patient On (Oxygen Delivery Method): room air    Gen: NAD  Cardiac: S1 s2 regular  Chest: + chest wall tenderness   Lungs: CTA b/l   Abd: + BS, soft, NT, ND  Ext: no edema    S/p [facility] JASPER to LAD, with chest pain that has since resolved. No active chest pain   - EKG: NSR @ 69 bpm, no acute changed compared with post cath EKG  - Tylenol PRN    D/w with cardiac NP and Dr. Franklin

## 2023-09-07 ENCOUNTER — TRANSCRIPTION ENCOUNTER (OUTPATIENT)
Age: 63
End: 2023-09-07

## 2023-09-07 VITALS
TEMPERATURE: 98 F | HEART RATE: 69 BPM | SYSTOLIC BLOOD PRESSURE: 145 MMHG | OXYGEN SATURATION: 96 % | RESPIRATION RATE: 17 BRPM | DIASTOLIC BLOOD PRESSURE: 65 MMHG

## 2023-09-07 LAB
A1C WITH ESTIMATED AVERAGE GLUCOSE RESULT: 10.1 % — HIGH (ref 4–5.6)
ANION GAP SERPL CALC-SCNC: 5 MMOL/L — SIGNIFICANT CHANGE UP (ref 5–17)
BASOPHILS # BLD AUTO: 0.04 K/UL — SIGNIFICANT CHANGE UP (ref 0–0.2)
BASOPHILS NFR BLD AUTO: 0.4 % — SIGNIFICANT CHANGE UP (ref 0–2)
BUN SERPL-MCNC: 16 MG/DL — SIGNIFICANT CHANGE UP (ref 7–23)
CALCIUM SERPL-MCNC: 9.3 MG/DL — SIGNIFICANT CHANGE UP (ref 8.5–10.1)
CHLORIDE SERPL-SCNC: 107 MMOL/L — SIGNIFICANT CHANGE UP (ref 96–108)
CO2 SERPL-SCNC: 26 MMOL/L — SIGNIFICANT CHANGE UP (ref 22–31)
CREAT SERPL-MCNC: 0.9 MG/DL — SIGNIFICANT CHANGE UP (ref 0.5–1.3)
EGFR: 72 ML/MIN/1.73M2 — SIGNIFICANT CHANGE UP
EOSINOPHIL # BLD AUTO: 0.5 K/UL — SIGNIFICANT CHANGE UP (ref 0–0.5)
EOSINOPHIL NFR BLD AUTO: 5.2 % — SIGNIFICANT CHANGE UP (ref 0–6)
ESTIMATED AVERAGE GLUCOSE: 243 MG/DL — HIGH (ref 68–114)
GLUCOSE BLDC GLUCOMTR-MCNC: 388 MG/DL — HIGH (ref 70–99)
GLUCOSE BLDC GLUCOMTR-MCNC: 437 MG/DL — HIGH (ref 70–99)
GLUCOSE BLDC GLUCOMTR-MCNC: 484 MG/DL — CRITICAL HIGH (ref 70–99)
GLUCOSE SERPL-MCNC: 310 MG/DL — HIGH (ref 70–99)
HCT VFR BLD CALC: 40.5 % — SIGNIFICANT CHANGE UP (ref 34.5–45)
HGB BLD-MCNC: 13.1 G/DL — SIGNIFICANT CHANGE UP (ref 11.5–15.5)
IMM GRANULOCYTES NFR BLD AUTO: 0.1 % — SIGNIFICANT CHANGE UP (ref 0–0.9)
LYMPHOCYTES # BLD AUTO: 4.15 K/UL — HIGH (ref 1–3.3)
LYMPHOCYTES # BLD AUTO: 43 % — SIGNIFICANT CHANGE UP (ref 13–44)
MCHC RBC-ENTMCNC: 28.5 PG — SIGNIFICANT CHANGE UP (ref 27–34)
MCHC RBC-ENTMCNC: 32.3 GM/DL — SIGNIFICANT CHANGE UP (ref 32–36)
MCV RBC AUTO: 88.2 FL — SIGNIFICANT CHANGE UP (ref 80–100)
MONOCYTES # BLD AUTO: 0.62 K/UL — SIGNIFICANT CHANGE UP (ref 0–0.9)
MONOCYTES NFR BLD AUTO: 6.4 % — SIGNIFICANT CHANGE UP (ref 2–14)
NEUTROPHILS # BLD AUTO: 4.34 K/UL — SIGNIFICANT CHANGE UP (ref 1.8–7.4)
NEUTROPHILS NFR BLD AUTO: 44.9 % — SIGNIFICANT CHANGE UP (ref 43–77)
PLATELET # BLD AUTO: 267 K/UL — SIGNIFICANT CHANGE UP (ref 150–400)
POTASSIUM SERPL-MCNC: 5 MMOL/L — SIGNIFICANT CHANGE UP (ref 3.5–5.3)
POTASSIUM SERPL-SCNC: 5 MMOL/L — SIGNIFICANT CHANGE UP (ref 3.5–5.3)
RBC # BLD: 4.59 M/UL — SIGNIFICANT CHANGE UP (ref 3.8–5.2)
RBC # FLD: 15.5 % — HIGH (ref 10.3–14.5)
SODIUM SERPL-SCNC: 138 MMOL/L — SIGNIFICANT CHANGE UP (ref 135–145)
WBC # BLD: 9.66 K/UL — SIGNIFICANT CHANGE UP (ref 3.8–10.5)
WBC # FLD AUTO: 9.66 K/UL — SIGNIFICANT CHANGE UP (ref 3.8–10.5)

## 2023-09-07 PROCEDURE — 93010 ELECTROCARDIOGRAM REPORT: CPT | Mod: 76

## 2023-09-07 RX ORDER — CLOPIDOGREL BISULFATE 75 MG/1
1 TABLET, FILM COATED ORAL
Qty: 30 | Refills: 11
Start: 2023-09-07 | End: 2024-08-31

## 2023-09-07 RX ORDER — ATORVASTATIN CALCIUM 80 MG/1
1 TABLET, FILM COATED ORAL
Qty: 0 | Refills: 0 | DISCHARGE

## 2023-09-07 RX ADMIN — Medication 6 UNIT(S): at 12:13

## 2023-09-07 RX ADMIN — GABAPENTIN 600 MILLIGRAM(S): 400 CAPSULE ORAL at 10:27

## 2023-09-07 RX ADMIN — LISINOPRIL 5 MILLIGRAM(S): 2.5 TABLET ORAL at 10:26

## 2023-09-07 RX ADMIN — Medication 500 MILLIGRAM(S): at 10:27

## 2023-09-07 RX ADMIN — Medication 6: at 12:03

## 2023-09-07 RX ADMIN — DORZOLAMIDE HYDROCHLORIDE TIMOLOL MALEATE 1 DROP(S): 20; 5 SOLUTION/ DROPS OPHTHALMIC at 10:33

## 2023-09-07 RX ADMIN — Medication 81 MILLIGRAM(S): at 10:26

## 2023-09-07 RX ADMIN — Medication 1 DROP(S): at 10:34

## 2023-09-07 RX ADMIN — Medication 5: at 09:43

## 2023-09-07 RX ADMIN — PANTOPRAZOLE SODIUM 40 MILLIGRAM(S): 20 TABLET, DELAYED RELEASE ORAL at 06:17

## 2023-09-07 RX ADMIN — Medication 6 UNIT(S): at 09:43

## 2023-09-07 RX ADMIN — Medication 1 DROP(S): at 10:26

## 2023-09-07 RX ADMIN — CLOPIDOGREL BISULFATE 75 MILLIGRAM(S): 75 TABLET, FILM COATED ORAL at 10:26

## 2023-09-07 NOTE — DISCHARGE NOTE PROVIDER - HOSPITAL COURSE
This is 63 year old female with PMH of HTN, HLD, IDDM, anxiety presented to cardiology with chest pain  cardiac work up completed ,  Cardiac PET abnormal perfusion defects noted within the mid to distal inferior wall and inferior apex , ca score of 71 . Referred for [facility]  (05 Sep 2023 13:28)  s/p LHC : sofy to LAD on 9/6/23. C/O of CP last night; 4/10 dull left side chest pain that last for only few minutes and resolved. EKG showed no acute changes from post PCI EKG. Reported left sided CP 2/10 this morning when she was laughing and when she walked to the bathroom.  Pt denies active chest pain/SOB/palpitations  during assessment  Post PCI instructions and all questions were answered via  # [id number]   This is 63 year old female with PMH of HTN, HLD, IDDM, anxiety presented to cardiology with chest pain  cardiac work up completed ,  Cardiac PET abnormal perfusion defects noted within the mid to distal inferior wall and inferior apex , ca score of 71 . Referred for [facility]  (05 Sep 2023 13:28)  s/p LHC : sofy to LAD on 9/6/23. C/O of CP last night; 4/10 dull left side chest pain that last for only few minutes and resolved. EKG showed no acute changes from post PCI EKG. Reported left sided CP 2/10 this morning when she was laughing and when she walked to the bathroom.  Pt denies active chest pain/SOB/palpitations  during assessment  Post PCI instructions and all questions were answered via  # [id number]  Will be  discharged on DAPT and will continue previous home meds

## 2023-09-07 NOTE — DISCHARGE NOTE PROVIDER - NSDCMRMEDTOKEN_GEN_ALL_CORE_FT
acetaminophen 325 mg oral tablet: 2 tab(s) orally every 4 hours, As needed, Temp greater or equal to 38C (100.4F)  ascorbic acid 500 mg oral tablet: 1 tab(s) orally every 12 hours  aspirin 81 mg oral delayed release tablet: 1 tab(s) orally once a day  atorvastatin 20 mg oral tablet: 1 tab(s) orally once a day  clopidogrel 75 mg oral tablet: 1 tab(s) orally once a day  dorzolamide-timolol 2%-0.5% preservative-free ophthalmic solution: 1 drop(s) in the right eye 2 times a day  gabapentin 600 mg oral tablet: 1 tab(s) orally 2 times a day  HumaLOG KwikPen 100 units/mL injectable solution: 6 unit(s) injectable 3 times a day (before meals).  hold if FSBG &lt; 80mg/dL.  lisinopril 5 mg oral tablet: 1 tab(s) orally once a day  metFORMIN 500 mg oral tablet: 2 tab(s) orally 2 times a day  omeprazole 20 mg oral delayed release capsule: 1 cap(s) orally once a day  prednisoLONE acetate 1% ophthalmic suspension: 1 application in the right eye 2 times a day  Seroquel 25 mg oral tablet: 1 tab(s) orally once a day (at bedtime)  Systane ophthalmic solution: 1 drop(s) to each affected eye 2 times a day

## 2023-09-07 NOTE — PROGRESS NOTE ADULT - REASON FOR ADMISSION
Cardiac PET abnormal perfusion defects noted within the mid to distal inferior wall and inferior apex , plan for C
Cardiac PET abnormal perfusion defects noted within the mid to distal inferior wall and inferior apex , plan for C

## 2023-09-07 NOTE — DISCHARGE NOTE NURSING/CASE MANAGEMENT/SOCIAL WORK - NSDCPEFALRISK_GEN_ALL_CORE
For information on Fall & Injury Prevention, visit: [email or web address] OR  [email or web address] OR  https://www.cdc.gov/steadi/patient.html

## 2023-09-07 NOTE — DISCHARGE NOTE PROVIDER - CARE PROVIDER_API CALL
John Lancaster  Cardiovascular Disease  [address] [place] [address]  Phone: [phone]  Fax: [phone]  Follow Up Time: 1 week

## 2023-09-07 NOTE — PROGRESS NOTE ADULT - SUBJECTIVE AND OBJECTIVE BOX
Department of Cardiology                                                               Division of Interventional Cardiology                                                               [facility] /[address] [address]                                                                                 [phone]           Post Procedure Note    HPI:  This is 63 year old female with PMH of HTN, HLD, IDDM, anxiety presented to cardiology with chest pain  cardiac work up completed ,  Cardiac PET abnormal perfusion defects noted within the mid to distal inferior wall and inferior apex , ca score of 71 . Referred for [facility]  (05 Sep 2023 13:28)    s/p [facility] : sofy to LAD   Pt denies chest pain/SOB/palpitations post cath.    Vital Signs  Vital Signs Last 24 Hrs  T(C): 36.6 (06 Sep 2023 07:26), Max: 36.6 (06 Sep 2023 07:26)  T(F): 97.8 (06 Sep 2023 07:26), Max: 97.8 (06 Sep 2023 07:26)  HR: 74 (06 Sep 2023 09:45) (64 - 77)  BP: 129/70 (06 Sep 2023 09:45) (117/87 - 152/74)  BP(mean): --  RR: 16 (06 Sep 2023 09:45) (16 - 16)  SpO2: 99% (06 Sep 2023 09:45) (99% - 100%)    Parameters below as of 06 Sep 2023 09:55  Patient On (Oxygen Delivery Method): room air          Labs:                  MEDICATIONS  (STANDING):  artificial  tears Solution 1 Drop(s) Both EYES daily  ascorbic acid 500 milliGRAM(s) Oral daily  aspirin enteric coated 81 milliGRAM(s) Oral daily  atorvastatin 20 milliGRAM(s) Oral at bedtime  dextrose 5%. 1000 milliLiter(s) (100 mL/Hr) IV Continuous <Continuous>  dextrose 5%. 1000 milliLiter(s) (50 mL/Hr) IV Continuous <Continuous>  dextrose 50% Injectable 12.5 Gram(s) IV Push once  dextrose 50% Injectable 25 Gram(s) IV Push once  dextrose 50% Injectable 25 Gram(s) IV Push once  dorzolamide 2%/timolol 0.5% Ophthalmic Solution 1 Drop(s) Both EYES two times a day  gabapentin 600 milliGRAM(s) Oral two times a day  glucagon  Injectable 1 milliGRAM(s) IntraMuscular once  insulin lispro (ADMELOG) corrective regimen sliding scale   SubCutaneous at bedtime  insulin lispro (ADMELOG) corrective regimen sliding scale   SubCutaneous three times a day before meals  insulin lispro Injectable (ADMELOG) 6 Unit(s) SubCutaneous three times a day before meals  lisinopril 5 milliGRAM(s) Oral daily  pantoprazole    Tablet 40 milliGRAM(s) Oral before breakfast  prednisoLONE acetate 1% Suspension 1 Drop(s) Both EYES two times a day  QUEtiapine 25 milliGRAM(s) Oral at bedtime  sodium chloride 0.9%. 1000 milliLiter(s) (140 mL/Hr) IV Continuous <Continuous>      PHYSICAL EXAM  GENERAL: NAD, AAOx3  CHEST/LUNG: Clear to auscultation bilaterally; No wheeze  HEART: s1 s2 Regular rate and rhythm; No murmurs, rubs, or gallops  ABDOMEN: Soft, Nontender, Nondistended; Bowel sounds present X 4 quadrants   EXTREMITIES:  2+ Peripheral Pulses, No clubbing, cyanosis, or edema  Psych: normal affect and behavior, calm and cooperative   PROCEDURE SITE: RRA accessed, hemoband to be removed 2 hours post placement. ,Site is without hematoma or bleeding. Sensation and KINJAL intact. Distal pulses palpable 2+, capillary refill < 2 seconds. Patient denies pain, numbness, tingling, CP or SOB.      EKG post pci - NSR rate 78    PROCEDURE RESULTS  < from: Cardiac Catheterization (09.06.23 @ 08:35) >  Indications:               Chest pain   + stress test   DM.     Conclusions:   1 Vessel CAD with NL LV FX and + IFR in proximal LAD.Successful PCI of  LAD  Recommendations:   Manage with Medical Therapy     Acute complication:    No complications     < end of copied text >      
Nurse Practitioner Progress note:   63 year old female with PMH of HTN, HLD, IDDM, anxiety presented to cardiology with chest pain  cardiac work up completed ,  Cardiac PET abnormal perfusion defects noted within the mid to distal inferior wall and inferior apex , ca score of 71 . Referred for [facility]  (05 Sep 2023 13:28)  s/p PCI of LAD on 9/6/23. Reported CP last night which has resolved. Left sided chest discomfort with laughing and ambulation this morning    Denies chest pain, shortness of breath, dizziness or palpitations at this time  PHYSICAL EXAM:  Constitutional: NAD  Neuro: Alert and oriented x 3 Speech clear No focal deficits  Neck: No JVD No bruit  Respiratory: CTAB  Cardiovascular: S1 and S2, RRR,   Gastrointestinal: BS+, soft, NT/ND  Extremities: No clubbing cyanosis or edema No varicosities  Vascular: 2+ peripheral pulses  Psychiatric: Normal mood, normal affect  Musculoskeletal: 5/5 strength b/l upper and lower extremitie  Right radial dsg removed; site CDI, no bleeding, no hematoma, able to move all digits with capillary refill < 3 seconds, fingers warm  T(C): 36.5 (09-07-23 @ 08:38), Max: 36.5 (09-07-23 @ 08:38)  HR: 69 (09-07-23 @ 08:38) (68 - 78)  BP: 145/65 (09-07-23 @ 08:38) (104/52 - 145/65)  RR: 17 (09-07-23 @ 08:38) (16 - 17)  SpO2: 96% (09-07-23 @ 08:38) (95% - 100%)    Tele: NSR  EKG: NSR. no acute ischemic changes    CBC Full  -  ( 07 Sep 2023 06:31 )  WBC Count : 9.66 K/uL  RBC Count : 4.59 M/uL  Hemoglobin : 13.1 g/dL  Hematocrit : 40.5 %  Platelet Count - Automated : 267 K/uL  Mean Cell Volume : 88.2 fl  Mean Cell Hemoglobin : 28.5 pg  Mean Cell Hemoglobin Concentration : 32.3 gm/dL  Auto Neutrophil # : 4.34 K/uL  Auto Lymphocyte # : 4.15 K/uL  Auto Monocyte # : 0.62 K/uL  Auto Eosinophil # : 0.50 K/uL  Auto Basophil # : 0.04 K/uL  Auto Neutrophil % : 44.9 %  Auto Lymphocyte % : 43.0 %  Auto Monocyte % : 6.4 %  Auto Eosinophil % : 5.2 %  Auto Basophil % : 0.4 %    09-07    138  |  107  |  16  ----------------------------<  310<H>  5.0   |  26  |  0.90    Ca    9.3      07 Sep 2023 06:31    MEDICATIONS  (STANDING):  artificial  tears Solution 1 Drop(s) Both EYES daily  ascorbic acid 500 milliGRAM(s) Oral daily  aspirin enteric coated 81 milliGRAM(s) Oral daily  atorvastatin 20 milliGRAM(s) Oral at bedtime  clopidogrel Tablet 75 milliGRAM(s) Oral daily  dextrose 5%. 1000 milliLiter(s) (100 mL/Hr) IV Continuous <Continuous>  dextrose 5%. 1000 milliLiter(s) (50 mL/Hr) IV Continuous <Continuous>  dextrose 50% Injectable 12.5 Gram(s) IV Push once  dextrose 50% Injectable 25 Gram(s) IV Push once  dextrose 50% Injectable 25 Gram(s) IV Push once  dorzolamide 2%/timolol 0.5% Ophthalmic Solution 1 Drop(s) Both EYES two times a day  gabapentin 600 milliGRAM(s) Oral two times a day  glucagon  Injectable 1 milliGRAM(s) IntraMuscular once  insulin lispro (ADMELOG) corrective regimen sliding scale   SubCutaneous at bedtime  insulin lispro (ADMELOG) corrective regimen sliding scale   SubCutaneous three times a day before meals  insulin lispro Injectable (ADMELOG) 6 Unit(s) SubCutaneous three times a day before meals  lisinopril 5 milliGRAM(s) Oral daily  pantoprazole    Tablet 40 milliGRAM(s) Oral before breakfast  prednisoLONE acetate 1% Suspension 1 Drop(s) Both EYES two times a day  QUEtiapine 25 milliGRAM(s) Oral at bedtime  sodium chloride 0.9%. 1000 milliLiter(s) (140 mL/Hr) IV Continuous <Continuous>    MEDICATIONS  (PRN):  acetaminophen     Tablet .. 650 milliGRAM(s) Oral every 6 hours PRN Temp greater or equal to 38C (100.4F), Mild Pain (1 - 3)  dextrose Oral Gel 15 Gram(s) Oral once PRN Blood Glucose LESS THAN 70 milliGRAM(s)/deciliter        HPI:  This is 63 year old female with PMH of HTN, HLD, IDDM, anxiety presented to cardiology with chest pain  cardiac work up completed ,  Cardiac PET abnormal perfusion defects noted within the mid to distal inferior wall and inferior apex , ca score of 71 . Referred for [facility]  (05 Sep 2023 13:28)    s/p PCI of LAD on 9/6/23    ASSESSMENT/PLAN:    Coronary artery disease  -Ambulate prior to discharge  -Aspirin 81 mg PO daily  -Plavix 75mg PO daily  --atorvastatin 20mg PO QHS   -Continue Lisinopril 5 mg PO daily  -Plan of care discussed with patient  via  and with MD  -ld/c today patient remains stable overnight  -Follow-up with attending MD   within 1 week  -Discussed therapeutic lifestyle changes to reduce risk factors such as following a cardiac diet, weight loss, maintaining a healthy weight, exercise, smoking cessation, medication compliance, and regular follow-up  with MD  -Pt was referred to cardiac rehab

## 2023-09-07 NOTE — DISCHARGE NOTE NURSING/CASE MANAGEMENT/SOCIAL WORK - PATIENT PORTAL LINK FT
You can access the FollowMyHealth Patient Portal offered by [facility] by registering at the following website: [email or web address]. By joining [employer]’s FollowMyHealth portal, you will also be able to view your health information using other applications (apps) compatible with our system.

## 2023-09-07 NOTE — DISCHARGE NOTE PROVIDER - NSDCCPCAREPLAN_GEN_ALL_CORE_FT
PRINCIPAL DISCHARGE DIAGNOSIS  Diagnosis: CAD (coronary artery disease)  Assessment and Plan of Treatment: ASA, Plavix, Lipitor

## 2023-09-09 RX ORDER — METFORMIN HYDROCHLORIDE 850 MG/1
2 TABLET ORAL
Qty: 0 | Refills: 0 | DISCHARGE
Start: 2023-09-09

## 2023-09-11 DIAGNOSIS — E78.5 HYPERLIPIDEMIA, UNSPECIFIED: ICD-10-CM

## 2023-09-11 DIAGNOSIS — I25.10 ATHEROSCLEROTIC HEART DISEASE OF NATIVE CORONARY ARTERY WITHOUT ANGINA PECTORIS: ICD-10-CM

## 2023-09-11 DIAGNOSIS — I10 ESSENTIAL (PRIMARY) HYPERTENSION: ICD-10-CM

## 2023-09-11 DIAGNOSIS — R94.39 ABNORMAL RESULT OF OTHER CARDIOVASCULAR FUNCTION STUDY: ICD-10-CM

## 2023-09-14 NOTE — POST DISCHARGE NOTE - DETAILS:
Post procedure phone call completed; patient and daughter understood all discharge paperwork. No questions regarding medications or pain management. MD follow up appointment made. Patient was able to rest when they were discharged. Patient will recommend [facility], no complaints of hospital stay, satisfied with care. Instructed patient to contact provider with any further questions or concerns.

## 2023-09-29 ENCOUNTER — INPATIENT (INPATIENT)
Facility: HOSPITAL | Age: 63
LOS: 0 days | Discharge: HOME | DRG: 203 | End: 2023-09-30
Attending: INTERNAL MEDICINE | Admitting: HOSPITALIST
Payer: MEDICAID

## 2023-09-29 VITALS — WEIGHT: 158.95 LBS | HEIGHT: 61 IN

## 2023-09-29 DIAGNOSIS — Z98.49 CATARACT EXTRACTION STATUS, UNSPECIFIED EYE: Chronic | ICD-10-CM

## 2023-09-29 DIAGNOSIS — Z98.890 OTHER SPECIFIED POSTPROCEDURAL STATES: Chronic | ICD-10-CM

## 2023-09-29 DIAGNOSIS — Z95.828 PRESENCE OF OTHER VASCULAR IMPLANTS AND GRAFTS: Chronic | ICD-10-CM

## 2023-09-29 DIAGNOSIS — R07.9 CHEST PAIN, UNSPECIFIED: ICD-10-CM

## 2023-09-29 DIAGNOSIS — M25.9 JOINT DISORDER, UNSPECIFIED: Chronic | ICD-10-CM

## 2023-09-29 DIAGNOSIS — Z98.51 TUBAL LIGATION STATUS: Chronic | ICD-10-CM

## 2023-09-29 DIAGNOSIS — E11.3592 TYPE 2 DIABETES MELLITUS WITH PROLIFERATIVE DIABETIC RETINOPATHY WITHOUT MACULAR EDEMA, LEFT EYE: Chronic | ICD-10-CM

## 2023-09-29 LAB
ALBUMIN SERPL ELPH-MCNC: 3.7 G/DL — SIGNIFICANT CHANGE UP (ref 3.3–5)
ALP SERPL-CCNC: 126 U/L — HIGH (ref 40–120)
ALT FLD-CCNC: 30 U/L — SIGNIFICANT CHANGE UP (ref 12–78)
ANION GAP SERPL CALC-SCNC: 3 MMOL/L — LOW (ref 5–17)
APTT BLD: 23 SEC — LOW (ref 24.5–35.6)
AST SERPL-CCNC: 21 U/L — SIGNIFICANT CHANGE UP (ref 15–37)
BASOPHILS # BLD AUTO: 0.06 K/UL — SIGNIFICANT CHANGE UP (ref 0–0.2)
BASOPHILS NFR BLD AUTO: 0.6 % — SIGNIFICANT CHANGE UP (ref 0–2)
BILIRUB SERPL-MCNC: 0.3 MG/DL — SIGNIFICANT CHANGE UP (ref 0.2–1.2)
BUN SERPL-MCNC: 14 MG/DL — SIGNIFICANT CHANGE UP (ref 7–23)
CALCIUM SERPL-MCNC: 9.6 MG/DL — SIGNIFICANT CHANGE UP (ref 8.5–10.1)
CHLORIDE SERPL-SCNC: 105 MMOL/L — SIGNIFICANT CHANGE UP (ref 96–108)
CO2 SERPL-SCNC: 29 MMOL/L — SIGNIFICANT CHANGE UP (ref 22–31)
CREAT SERPL-MCNC: 0.76 MG/DL — SIGNIFICANT CHANGE UP (ref 0.5–1.3)
EGFR: 89 ML/MIN/1.73M2 — SIGNIFICANT CHANGE UP
EOSINOPHIL # BLD AUTO: 0.56 K/UL — HIGH (ref 0–0.5)
EOSINOPHIL NFR BLD AUTO: 5.4 % — SIGNIFICANT CHANGE UP (ref 0–6)
GLUCOSE BLDC GLUCOMTR-MCNC: 84 MG/DL — SIGNIFICANT CHANGE UP (ref 70–99)
GLUCOSE SERPL-MCNC: 141 MG/DL — HIGH (ref 70–99)
HCT VFR BLD CALC: 39.1 % — SIGNIFICANT CHANGE UP (ref 34.5–45)
HGB BLD-MCNC: 12.9 G/DL — SIGNIFICANT CHANGE UP (ref 11.5–15.5)
IMM GRANULOCYTES NFR BLD AUTO: 0.1 % — SIGNIFICANT CHANGE UP (ref 0–0.9)
INR BLD: 0.82 RATIO — LOW (ref 0.85–1.18)
LYMPHOCYTES # BLD AUTO: 4.45 K/UL — HIGH (ref 1–3.3)
LYMPHOCYTES # BLD AUTO: 43.1 % — SIGNIFICANT CHANGE UP (ref 13–44)
MAGNESIUM SERPL-MCNC: 1.9 MG/DL — SIGNIFICANT CHANGE UP (ref 1.6–2.6)
MCHC RBC-ENTMCNC: 29 PG — SIGNIFICANT CHANGE UP (ref 27–34)
MCHC RBC-ENTMCNC: 33 GM/DL — SIGNIFICANT CHANGE UP (ref 32–36)
MCV RBC AUTO: 87.9 FL — SIGNIFICANT CHANGE UP (ref 80–100)
MONOCYTES # BLD AUTO: 0.66 K/UL — SIGNIFICANT CHANGE UP (ref 0–0.9)
MONOCYTES NFR BLD AUTO: 6.4 % — SIGNIFICANT CHANGE UP (ref 2–14)
NEUTROPHILS # BLD AUTO: 4.59 K/UL — SIGNIFICANT CHANGE UP (ref 1.8–7.4)
NEUTROPHILS NFR BLD AUTO: 44.4 % — SIGNIFICANT CHANGE UP (ref 43–77)
PLATELET # BLD AUTO: 329 K/UL — SIGNIFICANT CHANGE UP (ref 150–400)
POTASSIUM SERPL-MCNC: 4 MMOL/L — SIGNIFICANT CHANGE UP (ref 3.5–5.3)
POTASSIUM SERPL-SCNC: 4 MMOL/L — SIGNIFICANT CHANGE UP (ref 3.5–5.3)
PROT SERPL-MCNC: 7.6 GM/DL — SIGNIFICANT CHANGE UP (ref 6–8.3)
PROTHROM AB SERPL-ACNC: 9.3 SEC — LOW (ref 9.5–13)
RBC # BLD: 4.45 M/UL — SIGNIFICANT CHANGE UP (ref 3.8–5.2)
RBC # FLD: 14.9 % — HIGH (ref 10.3–14.5)
SODIUM SERPL-SCNC: 137 MMOL/L — SIGNIFICANT CHANGE UP (ref 135–145)
TROPONIN I, HIGH SENSITIVITY RESULT: 4.77 NG/L — SIGNIFICANT CHANGE UP
TROPONIN I, HIGH SENSITIVITY RESULT: 5.07 NG/L — SIGNIFICANT CHANGE UP
WBC # BLD: 10.33 K/UL — SIGNIFICANT CHANGE UP (ref 3.8–10.5)
WBC # FLD AUTO: 10.33 K/UL — SIGNIFICANT CHANGE UP (ref 3.8–10.5)

## 2023-09-29 PROCEDURE — 84484 ASSAY OF TROPONIN QUANT: CPT

## 2023-09-29 PROCEDURE — 71045 X-RAY EXAM CHEST 1 VIEW: CPT | Mod: 26

## 2023-09-29 PROCEDURE — 36415 COLL VENOUS BLD VENIPUNCTURE: CPT

## 2023-09-29 PROCEDURE — G0378: CPT

## 2023-09-29 PROCEDURE — 99285 EMERGENCY DEPT VISIT HI MDM: CPT

## 2023-09-29 PROCEDURE — 90686 IIV4 VACC NO PRSV 0.5 ML IM: CPT

## 2023-09-29 PROCEDURE — 90471 IMMUNIZATION ADMIN: CPT

## 2023-09-29 PROCEDURE — 93010 ELECTROCARDIOGRAM REPORT: CPT

## 2023-09-29 RX ORDER — ASPIRIN/CALCIUM CARB/MAGNESIUM 324 MG
324 TABLET ORAL ONCE
Refills: 0 | Status: COMPLETED | OUTPATIENT
Start: 2023-09-29 | End: 2023-09-29

## 2023-09-29 RX ORDER — NITROGLYCERIN 6.5 MG
0.4 CAPSULE, EXTENDED RELEASE ORAL ONCE
Refills: 0 | Status: COMPLETED | OUTPATIENT
Start: 2023-09-29 | End: 2023-09-29

## 2023-09-29 RX ADMIN — Medication 324 MILLIGRAM(S): at 16:27

## 2023-09-29 RX ADMIN — Medication 0.4 MILLIGRAM(S): at 16:27

## 2023-09-29 NOTE — ED ADULT TRIAGE NOTE - CHIEF COMPLAINT QUOTE
pt ambulatory to ED c/o sharp chest pain radiating to left arm. pt with cardiac surgery on 9/6/2023. pt reports pain started around 1700 last night. no medications taken PTA.

## 2023-09-29 NOTE — ED PROVIDER NOTE - CLINICAL SUMMARY MEDICAL DECISION MAKING FREE TEXT BOX
62 y/o female with recent stent x1 presents with mid sternal chest pressure. Will evaluate for ACS, stent occlusion, aneurysm formation. Plan: labs, chest XR, cardio consult.

## 2023-09-29 NOTE — PATIENT PROFILE ADULT - FUNCTIONAL ASSESSMENT - BASIC MOBILITY 6.
3-calculated by average/Not able to assess (calculate score using [facility] averaging method)

## 2023-09-29 NOTE — PATIENT PROFILE ADULT - VISION (WITH CORRECTIVE LENSES IF THE PATIENT USUALLY WEARS THEM):
pt can not see from left eye and r eye only sees a little/Severely impaired: cannot locate objects without hearing or touching them or patient nonresponsive.

## 2023-09-29 NOTE — ED ADULT NURSE NOTE - OBJECTIVE STATEMENT
Pt presented to the ER with c/o CP that the pain radiated down her left arm. Pt stated that the pain started last night and was on and off. Pt stated that she called her cardiologist and was told to come to the ER for evaluation at this time.

## 2023-09-29 NOTE — ED PROVIDER NOTE - NSICDXPASTSURGICALHX_GEN_ALL_CORE_FT
PAST SURGICAL HISTORY:  [place] filter in place left    H/O tubal ligation     History of cystoscopy history  nephrolithiasis and recurrent UTI S/P ESWL 5/2016    History of loop recorder     Proliferative diabetic retinopathy of left eye s/p PPV/laser/silicone  5/31/16 and 11/29/16 OS    S/P cataract surgery b/l    Shoulder disorder left

## 2023-09-29 NOTE — ED ADULT NURSE NOTE - NSICDXPASTSURGICALHX_GEN_ALL_CORE_FT
PAST SURGICAL HISTORY:  [place] filter in place left    H/O tubal ligation     History of cystoscopy history  nephrolithiasis and recurrent UTI S/P ESWL 5/2016    History of loop recorder     Proliferative diabetic retinopathy of left eye s/p PPV/laser/silicone  5/31/16 and 11/29/16 OS    S/P cataract surgery b/l    Shoulder disorder left

## 2023-09-29 NOTE — PATIENT PROFILE ADULT - FALL HARM RISK - HARM RISK INTERVENTIONS

## 2023-09-29 NOTE — ED PROVIDER NOTE - PROGRESS NOTE DETAILS
Gregory Smyth: spoke with Dr. Whatley on call for Dr. JOHN Lancaster discussed lab work, recommended overnight admission. Spoke with pt and  at bedside, advised plan and all questions answered. DO Haja: Patient endorsed to Dr. Olivarez for admission.

## 2023-09-29 NOTE — ED PROVIDER NOTE - OBJECTIVE STATEMENT
64 y/o female with a PMHx of anxiety, b/l cataracts, constipation, DM, DJD, DVT, GERD, HLD, HTN, insomnia, neuropathy, pseudophakia, PE, syncope, TIA, vitamin D deficiency, s/p drug eluting LAD stent on 9/6 presents to the ED for two episodes of chest pressure yesterday. No other complaints at this time. Cardio: Dr. Lancaster.  ID#: [id number].

## 2023-09-30 ENCOUNTER — TRANSCRIPTION ENCOUNTER (OUTPATIENT)
Age: 63
End: 2023-09-30

## 2023-09-30 VITALS
RESPIRATION RATE: 18 BRPM | OXYGEN SATURATION: 98 % | DIASTOLIC BLOOD PRESSURE: 68 MMHG | SYSTOLIC BLOOD PRESSURE: 145 MMHG | HEART RATE: 73 BPM

## 2023-09-30 LAB — TROPONIN I, HIGH SENSITIVITY RESULT: 4.24 NG/L — SIGNIFICANT CHANGE UP

## 2023-09-30 PROCEDURE — 99238 HOSP IP/OBS DSCHRG MGMT 30/<: CPT

## 2023-09-30 RX ORDER — INFLUENZA VIRUS VACCINE 15; 15; 15; 15 UG/.5ML; UG/.5ML; UG/.5ML; UG/.5ML
0.5 SUSPENSION INTRAMUSCULAR ONCE
Refills: 0 | Status: COMPLETED | OUTPATIENT
Start: 2023-09-30 | End: 2023-09-30

## 2023-09-30 RX ORDER — INSULIN LISPRO 100 [IU]/ML
40 INJECTION, SUSPENSION SUBCUTANEOUS
Refills: 0 | DISCHARGE

## 2023-09-30 RX ORDER — QUETIAPINE FUMARATE 200 MG/1
1 TABLET, FILM COATED ORAL
Refills: 0 | DISCHARGE

## 2023-09-30 RX ORDER — INSULIN LISPRO 100 [IU]/ML
36 INJECTION, SUSPENSION SUBCUTANEOUS
Refills: 0 | DISCHARGE

## 2023-09-30 RX ORDER — ASPIRIN/CALCIUM CARB/MAGNESIUM 324 MG
1 TABLET ORAL
Qty: 0 | Refills: 0 | DISCHARGE

## 2023-09-30 RX ADMIN — INFLUENZA VIRUS VACCINE 0.5 MILLILITER(S): 15; 15; 15; 15 SUSPENSION INTRAMUSCULAR at 11:03

## 2023-09-30 NOTE — DISCHARGE NOTE NURSING/CASE MANAGEMENT/SOCIAL WORK - PATIENT PORTAL LINK FT
You can access the FollowMyHealth Patient Portal offered by [facility] by registering at the following website: [email or web address]. By joining [employer]’s FollowMyHealth portal, you will also be able to view your health information using other applications (apps) compatible with our system.

## 2023-09-30 NOTE — DISCHARGE NOTE PROVIDER - HOSPITAL COURSE
HPI:63 year old female, history of BM, who after  a right eye procedure about 10 days ago (?) developed palpitations, which eventually led to a Cardiac Stent.  Yesterday she experienced a short, about two seconds of a sharp chest pain, going down her left arm.  The repeated soon after, and she came to [place] [facility].  ( The patient only speaks [place] Zealander).  The lab work demonstrates two negative Troponins. Elevated glucose.  Two EKG demonstrated NSR, LVH, but no acute changes.  Physical exam was unremarkable:  Carotid upstroke normal, no JVD  Lungs clear  Cor  S1S2 single, no murmurs, gallops, rubs.  Extremities no edema, peripheral pulses = bilat.      REVIEW OF SYSTEMS:    CONSTITUTIONAL: No fever, weight loss, or fatigue  EYES: No eye pain, visual disturbances, or discharge  ENMT:  No difficulty hearing, tinnitus, vertigo; No sinus or throat pain  NECK: No pain or stiffness    RESPIRATORY: No cough, wheezing, chills or hemoptysis; No shortness of breath  CARDIOVASCULAR: No chest pain, palpitations, dizziness, or leg swelling  GASTROINTESTINAL: No abdominal or epigastric pain. No nausea, vomiting, or hematemesis; No diarrhea or constipation. No melena or hematochezia.    NEUROLOGICAL: No headaches, memory loss, loss of strength, numbness, or tremors  SKIN: No itching, burning, rashes, or lesions       MUSCULOSKELETAL: No joint pain or swelling; No muscle, back, or extremity pain  PSYCHIATRIC: No depression, anxiety, mood swings, or difficulty sleeping    ALLERGY AND IMMUNOLOGIC: No hives or eczema    MEDICATIONS  (STANDING):  influenza   Vaccine 0.5 milliLiter(s) IntraMuscular once    MEDICATIONS  (PRN):      ALLERGIES: oxycodone (Urticaria; Rash)  hydrocodone (Hives)      FAMILY HISTORY:  No pertinent family history in first degree relatives    No pertinent family history in first degree relatives      Home Medications:  acetaminophen 325 mg oral tablet: 2 tab(s) orally every 4 hours, As needed, Temp greater or equal to 38C (100.4F) (06 Sep 2023 07:47)  ascorbic acid 500 mg oral tablet: 1 tab(s) orally every 12 hours (06 Sep 2023 07:47)  aspirin 81 mg oral delayed release tablet: 1 tab(s) orally once a day (06 Sep 2023 07:47)  atorvastatin 20 mg oral tablet: 1 tab(s) orally once a day (06 Sep 2023 07:47)  dorzolamide-timolol 2%-0.5% preservative-free ophthalmic solution: 1 drop(s) in the right eye 2 times a day (06 Sep 2023 07:47)  gabapentin 600 mg oral tablet: 1 tab(s) orally 2 times a day (06 Sep 2023 07:47)  lisinopril 5 mg oral tablet: 1 tab(s) orally once a day (06 Sep 2023 07:47)  metFORMIN 500 mg oral tablet: 2 tab(s) orally 2 times a day (07 Sep 2023 08:22)  omeprazole 20 mg oral delayed release capsule: 1 cap(s) orally once a day (06 Sep 2023 07:47)  prednisoLONE acetate 1% ophthalmic suspension: 1 application in the right eye 2 times a day (06 Sep 2023 07:47)  Seroquel 25 mg oral tablet: 1 tab(s) orally once a day (at bedtime) (05 Sep 2023 12:21)  Systane ophthalmic solution: 1 drop(s) to each affected eye 2 times a day (06 Sep 2023 07:47)      PHYSICAL EXAMINATION:  -----------------------------  T(C): 36.3 (09-30-23 @ 00:05), Max: 36.6 (09-29-23 @ 14:32)  HR: 78 (09-30-23 @ 00:05) (78 - 89)  BP: 135/69 (09-30-23 @ 00:05) (117/59 - 147/71)  RR: 17 (09-30-23 @ 00:05) (17 - 18)  SpO2: 99% (09-30-23 @ 00:05) (95% - 99%)  Wt(kg): --    ekg lvh    63 year old female, Diabetic, with recent Cardiac stent.  This admission was for two brief episodes of chest pain, that is not suggestive of ischemia.  The patients labs and EKG were not suggestive of a cardiac origin of her symptoms.  She slept well and has no further symptoms.    Suggest:   Per cardio no Cardiac objection to discharge home today.  She was told to call her MD Monday.  If symptoms progress, to return to hospital.

## 2023-09-30 NOTE — DISCHARGE NOTE NURSING/CASE MANAGEMENT/SOCIAL WORK - NSDCPEFALRISK_GEN_ALL_CORE
For information on Fall & Injury Prevention, visit: [email or web address] OR  [email or web address] OR  https://www.cdc.gov/steadi/patient.html

## 2023-09-30 NOTE — CONSULT NOTE ADULT - ASSESSMENT
63 year old female, Diabetic, with recent Cardiac stent.  This admission was for two brief episodes of chest pain, that is not suggestive of ischemia.  The patients labs and EKG were not suggestive of a cardiac origin of her symptoms.  She slept well and has no further symptoms.    Suggest:   No Cardiac objection to discharge home today.  She was told to call her MD Monday.  If symptoms progress, to return to hospital.

## 2023-09-30 NOTE — DISCHARGE NOTE PROVIDER - NSDCCPCAREPLAN_GEN_ALL_CORE_FT
PRINCIPAL DISCHARGE DIAGNOSIS  Diagnosis: Chest pain  Assessment and Plan of Treatment: f/up with Dr Tonny riojast week

## 2023-09-30 NOTE — DISCHARGE NOTE PROVIDER - CARE PROVIDER_API CALL
John Lancaster  Cardiovascular Disease  [address] [place] [address]  Phone: [phone]  Fax: [phone]  Follow Up Time: 1-3 days

## 2023-09-30 NOTE — DISCHARGE NOTE PROVIDER - NSDCMRMEDTOKEN_GEN_ALL_CORE_FT
acetaminophen 325 mg oral tablet: 2 tab(s) orally every 4 hours, As needed, Temp greater or equal to 38C (100.4F)  ascorbic acid 500 mg oral tablet: 1 tab(s) orally every 12 hours  aspirin 81 mg oral delayed release tablet: 1 tab(s) orally once a day  atorvastatin 20 mg oral tablet: 1 tab(s) orally once a day  clopidogrel 75 mg oral tablet: 1 tab(s) orally once a day  dorzolamide-timolol 2%-0.5% preservative-free ophthalmic solution: 1 drop(s) in the right eye 2 times a day  gabapentin 600 mg oral tablet: 1 tab(s) orally 2 times a day  HumaLOG KwikPen 100 units/mL injectable solution: 6 unit(s) injectable 3 times a day (before meals).  hold if FSBG &lt; 80mg/dL.  lisinopril 5 mg oral tablet: 1 tab(s) orally once a day  metFORMIN 500 mg oral tablet: 2 tab(s) orally 2 times a day  omeprazole 20 mg oral delayed release capsule: 1 cap(s) orally once a day  prednisoLONE acetate 1% ophthalmic suspension: 1 application in the right eye 2 times a day  Seroquel 25 mg oral tablet: 1 tab(s) orally once a day (at bedtime)  Systane ophthalmic solution: 1 drop(s) to each affected eye 2 times a day   alendronate 70 mg oral tablet: 1 tab(s) orally once a week  atorvastatin 20 mg oral tablet: 1 tab(s) orally once a day  clopidogrel 75 mg oral tablet: 1 tab(s) orally once a day  dorzolamide-timolol 2%-0.5% preservative-free ophthalmic solution: 1 drop(s) in the right eye 2 times a day  gabapentin 600 mg oral tablet: 1 tab(s) orally 2 times a day  latanoprost 0.005% ophthalmic solution: 1 drop(s) in each eye once a day (at bedtime) to both eyes  lisinopril 5 mg oral tablet: 1 tab(s) orally once a day  metFORMIN 500 mg oral tablet: 2 tab(s) orally 2 times a day  omeprazole 20 mg oral delayed release capsule: 1 cap(s) orally once a day  prednisoLONE acetate 1% ophthalmic suspension: 1 application in the right eye 2 times a day  Systane ophthalmic solution: 1 drop(s) to each affected eye 2 times a day

## 2023-09-30 NOTE — CONSULT NOTE ADULT - SUBJECTIVE AND OBJECTIVE BOX
CARDIOLOGY CONSULT NOTE    Patient is a 63y Female with a known history of :    HPI:63 year old female, history of BM, who after  a right eye procedure about 10 days ago (?) developed palpitations, which eventually led to a Cardiac Stent.  Yesterday she experienced a short, about two seconds of a sharp chest pain, going down her left arm.  The repeated soon after, and she came to [facility].  ( The patient only speaks Yoruba).  The lab work demonstrates two negative Troponins. Elevated glucose.  Two EKG demonstrated NSR, LVH, but no acute changes.  Physical exam was unremarkable:  Carotid upstroke normal, no JVD  Lungs clear  Cor  S1S2 single, no murmurs, gallops, rubs.  Extremities no edema, peripheral pulses = bilat.      REVIEW OF SYSTEMS:    CONSTITUTIONAL: No fever, weight loss, or fatigue  EYES: No eye pain, visual disturbances, or discharge  ENMT:  No difficulty hearing, tinnitus, vertigo; No sinus or throat pain  NECK: No pain or stiffness    RESPIRATORY: No cough, wheezing, chills or hemoptysis; No shortness of breath  CARDIOVASCULAR: No chest pain, palpitations, dizziness, or leg swelling  GASTROINTESTINAL: No abdominal or epigastric pain. No nausea, vomiting, or hematemesis; No diarrhea or constipation. No melena or hematochezia.    NEUROLOGICAL: No headaches, memory loss, loss of strength, numbness, or tremors  SKIN: No itching, burning, rashes, or lesions       MUSCULOSKELETAL: No joint pain or swelling; No muscle, back, or extremity pain  PSYCHIATRIC: No depression, anxiety, mood swings, or difficulty sleeping    ALLERGY AND IMMUNOLOGIC: No hives or eczema    MEDICATIONS  (STANDING):  influenza   Vaccine 0.5 milliLiter(s) IntraMuscular once    MEDICATIONS  (PRN):      ALLERGIES: oxycodone (Urticaria; Rash)  hydrocodone (Hives)      FAMILY HISTORY:  No pertinent family history in first degree relatives    No pertinent family history in first degree relatives        Social History:  Alochol:   Smoking:   Drug Use:   Marital Status:     PHYSICAL EXAMINATION:  -----------------------------  T(C): 36.3 (09-30-23 @ 00:05), Max: 36.6 (09-29-23 @ 14:32)  HR: 78 (09-30-23 @ 00:05) (78 - 89)  BP: 135/69 (09-30-23 @ 00:05) (117/59 - 147/71)  RR: 17 (09-30-23 @ 00:05) (17 - 18)  SpO2: 99% (09-30-23 @ 00:05) (95% - 99%)  Wt(kg): --    Height (cm): 154.9 (09-29 @ 14:27)  Weight (kg): 70.3 (09-30 @ 00:05)  BMI (kg/m2): 29.3 (09-30 @ 00:05)  BSA (m2): 1.69 (09-30 @ 00:05)    Constitutional: well developed, normal appearance, well groomed, well nourished, no deformities and no acute distress.   Eyes: the conjunctiva exhibited no abnormalities and the eyelids demonstrated no xanthelasmas.   HEENT: normal oral mucosa, no oral pallor and no oral cyanosis.   Neck: normal jugular venous A waves present, normal jugular venous V waves present and no jugular venous guerrero A waves.   Pulmonary: no respiratory distress, normal respiratory rhythm and effort, no accessory muscle use and lungs were clear to auscultation bilaterally.   Cardiovascular: heart rate and rhythm were normal, normal S1 and S2 and no murmur, gallop, rub, heave or thrill are present.   Abdomen: soft, non-tender, no hepato-splenomegaly and no abdominal mass palpated.   Musculoskeletal: the gait could not be assessed..   Extremities: no clubbing of the fingernails, no localized cyanosis, no petechial hemorrhages and no ischemic changes.   Skin: normal skin color and pigmentation, no rash, no venous stasis, no skin lesions, no skin ulcer and no xanthoma was observed.   Psychiatric: oriented to person, place, and time, the affect was normal, the mood was normal and not feeling anxious.     LABS:   --------    CBC Full  -  ( 29 Sep 2023 15:56 )  WBC Count : 10.33 K/uL  RBC Count : 4.45 M/uL  Hemoglobin : 12.9 g/dL  Hematocrit : 39.1 %  Platelet Count - Automated : 329 K/uL  Mean Cell Volume : 87.9 fl  Mean Cell Hemoglobin : 29.0 pg  Mean Cell Hemoglobin Concentration : 33.0 gm/dL  Auto Neutrophil # : 4.59 K/uL  Auto Lymphocyte # : 4.45 K/uL  Auto Monocyte # : 0.66 K/uL  Auto Eosinophil # : 0.56 K/uL  Auto Basophil # : 0.06 K/uL  Auto Neutrophil % : 44.4 %  Auto Lymphocyte % : 43.1 %  Auto Monocyte % : 6.4 %  Auto Eosinophil % : 5.4 %  Auto Basophil % : 0.6 %        09-29    137  |  105  |  14  ----------------------------<  141<H>  4.0   |  29  |  0.76    Ca    9.6      29 Sep 2023 15:56  Mg     1.9     09-29    TPro  7.6  /  Alb  3.7  /  TBili  0.3  /  DBili  x   /  AST  21  /  ALT  30  /  AlkPhos  126<H>  09-29             PT/INR - ( 29 Sep 2023 15:56 )   PT: 9.3 sec;   INR: 0.82 ratio         PTT - ( 29 Sep 2023 15:56 )  PTT:23.0 sec              RADIOLOGY:  -----------------      ECG: NSR LVH, no acute changes ( X2)    ECHO:

## 2023-09-30 NOTE — DISCHARGE NOTE NURSING/CASE MANAGEMENT/SOCIAL WORK - NSDCVIVACCINE_GEN_ALL_CORE_FT
influenza, injectable, quadrivalent, preservative free; 30-Sep-2023 11:03; Yonatan Singer (RN); [employer]; [id number] (Exp. Date: 29-Jun-2024); IntraMuscular; Deltoid Left.; 0.5 milliLiter(s); VIS (VIS Published: 06-Aug-2021, VIS Presented: 30-Sep-2023);

## 2023-10-02 DIAGNOSIS — R07.9 CHEST PAIN, UNSPECIFIED: ICD-10-CM

## 2023-10-02 DIAGNOSIS — Z88.5 ALLERGY STATUS TO NARCOTIC AGENT: ICD-10-CM

## 2023-10-02 DIAGNOSIS — Z95.5 PRESENCE OF CORONARY ANGIOPLASTY IMPLANT AND GRAFT: ICD-10-CM

## 2023-10-02 DIAGNOSIS — Z86.718 PERSONAL HISTORY OF OTHER VENOUS THROMBOSIS AND EMBOLISM: ICD-10-CM

## 2023-10-02 DIAGNOSIS — Z79.02 LONG TERM (CURRENT) USE OF ANTITHROMBOTICS/ANTIPLATELETS: ICD-10-CM

## 2023-10-02 DIAGNOSIS — Z86.711 PERSONAL HISTORY OF PULMONARY EMBOLISM: ICD-10-CM

## 2023-10-02 DIAGNOSIS — Z79.82 LONG TERM (CURRENT) USE OF ASPIRIN: ICD-10-CM

## 2023-10-02 DIAGNOSIS — E78.5 HYPERLIPIDEMIA, UNSPECIFIED: ICD-10-CM

## 2023-10-02 DIAGNOSIS — Z79.84 LONG TERM (CURRENT) USE OF ORAL HYPOGLYCEMIC DRUGS: ICD-10-CM

## 2023-10-02 DIAGNOSIS — I10 ESSENTIAL (PRIMARY) HYPERTENSION: ICD-10-CM

## 2023-10-02 DIAGNOSIS — Z86.73 PERSONAL HISTORY OF TRANSIENT ISCHEMIC ATTACK (TIA), AND CEREBRAL INFARCTION WITHOUT RESIDUAL DEFICITS: ICD-10-CM

## 2023-10-02 DIAGNOSIS — E11.40 TYPE 2 DIABETES MELLITUS WITH DIABETIC NEUROPATHY, UNSPECIFIED: ICD-10-CM

## 2023-10-11 NOTE — POST DISCHARGE NOTE - DETAILS:
30 day post procedure phone call completed;  No questions regarding medications or pain management. Continues to follow up with MD. Patient will continue to recommend [facility], no complaints of hospital stay, satisfied with care. Instructed patient to contact provider with any further questions or concerns.

## 2023-10-25 NOTE — ASU DISCHARGE PLAN (ADULT/PEDIATRIC). - PATIENT/GUARDIAN NAME (SIGNATURE): ____________________________________
Patient advised as above. Patient advised to discuss with pharmacy and/or insurance company as to why she is having difficulty getting medication. Patient verbalized understanding.  
Patient requesting PA on Thyroid 15 mg.  
Statement Selected

## 2024-02-20 NOTE — ED ADULT NURSE NOTE - CHPI ED NUR RELIEVING FX
Problem: Occupational Therapy  Goal: Occupational Therapy Goal  Description: Goals to be met by: 03-04-24     Patient will increase functional independence with ADLs by performing:    Feeding with Supervision.  UE Dressing with Minimal Assistance.  LE Dressing with Moderate Assistance.  Grooming while seated with Supervision.  Toileting from bedside commode with Moderate Assistance for hygiene and clothing management.   Supine to sit with Stand-by Assistance.  Stand pivot transfers with Contact Guard Assistance.  Step transfer with Contact Guard Assistance  Toilet transfer to bedside commode with Contact Guard Assistance.  Pt. To be Independent with hEP to BUE to improve level of endurance    Outcome: Ongoing, Progressing      none

## 2024-03-01 NOTE — ASU PATIENT PROFILE, ADULT - PATIENT/CAREGIVER OFFERED  INTERPRETER SERVICES
ON 2/28: S/p OR, POC wnl, voided 350cc, pTOV    POST-OP DAY: 1 s/p elective pilonidal abscess cavity excision and debridement of tracts with vac placement (2/28)      SUBJECTIVE: Patient seen and examined bedside by Surgical resident. resting comfortably in bed this AM, VAC to suction in place. -n/v, sob, pain well controlled at this time.     ceFAZolin   IVPB 2000 milliGRAM(s) IV Intermittent every 8 hours    MEDICATIONS  (PRN):  ondansetron Injectable 4 milliGRAM(s) IV Push every 6 hours PRN Nausea and/or Vomiting  oxyCODONE    IR 10 milliGRAM(s) Oral every 6 hours PRN Severe Pain (7 - 10)  oxyCODONE    IR 5 milliGRAM(s) Oral every 6 hours PRN Moderate Pain (4 - 6)      I&O's Detail    28 Feb 2024 07:01  -  29 Feb 2024 06:55  --------------------------------------------------------  IN:    IV PiggyBack: 50 mL    Lactated Ringers Bolus: 1250 mL    Other (mL): 750 mL  Total IN: 2050 mL    OUT:    VAC (Vacuum Assisted Closure) System (mL): 40 mL    Voided (mL): 550 mL  Total OUT: 590 mL    Total NET: 1460 mL          Vital Signs Last 24 Hrs  T(C): 36.5 (29 Feb 2024 05:38), Max: 36.8 (28 Feb 2024 18:16)  T(F): 97.7 (29 Feb 2024 05:38), Max: 98 (29 Feb 2024 00:27)  HR: 68 (29 Feb 2024 05:38) (48 - 97)  BP: 98/59 (29 Feb 2024 05:38) (79/51 - 120/73)  BP(mean): 78 (28 Feb 2024 22:20) (59 - 78)  RR: 16 (29 Feb 2024 05:38) (10 - 16)  SpO2: 98% (29 Feb 2024 05:38) (95% - 100%)    Parameters below as of 29 Feb 2024 05:38  Patient On (Oxygen Delivery Method): room air          Physical Exam  General: Patient is doing well and lying in bed comfortably  Constitutional: alert and oriented   Pulm: Nonlabored breathing, no respiratory distress  CV: Regular rate and rhythm  Abd:  soft, nontender, nondistended. No rebound, no guarding.   Buttock: Wound vac in place and seal intact.   Extremities: warm, well perfused, no edema    LABS:                RADIOLOGY & ADDITIONAL STUDIES:      Culture - Abscess with Gram Stain (collected 02-28-24 @ 18:00)  Source: .Abscess left buttock abcess #5 or spec  Gram Stain (02-28-24 @ 21:48):    Moderate Gram Negative Rods    Moderate Gram positive cocci in pairs and clusters    Rare Gram Positive Rods    Few-moderate White blood cells    Culture - Abscess with Gram Stain (collected 02-28-24 @ 18:00)  Source: .Abscess left buttock abcess #4 or spec  Gram Stain (02-28-24 @ 21:47):    Rare Gram positive cocci in pairs    Few WBC's    Culture - Abscess with Gram Stain (collected 02-28-24 @ 18:00)  Source: .Abscess left buttock abcess #3 or spec  Gram Stain (02-28-24 @ 21:47):    Rare Gram Negative Rods    Few Gram positive cocci in pairs    Few WBC's    Culture - Abscess with Gram Stain (collected 02-28-24 @ 18:00)  Source: .Abscess left buttock abcess #2 or spec  Gram Stain (02-28-24 @ 21:46):    No organisms seen    Rare to few White blood cells    Culture - Abscess with Gram Stain (collected 02-28-24 @ 18:00)  Source: .Abscess left buttock abcess #1 or spec  Gram Stain (02-28-24 @ 21:46):    Rare Gram Negative Rods    Few Gram positive cocci in pairs    Few WBC's     INTERVAL HPI/OVERNIGHT EVENTS: VEE, VSS     STATUS POST:  pilonidal abscess cavity excision and debridement of tracts with vac placement (2/28)    POST OPERATIVE DAY #: 2    SUBJECTIVE: Pt seen and examined at bedside this am by surgery team. No acute complaints. Tolerating diet, pain well controlled. Denies f/n/v/cp/sob.    MEDICATIONS  (STANDING):  heparin   Injectable 5000 Unit(s) SubCutaneous every 8 hours    MEDICATIONS  (PRN):  ondansetron Injectable 4 milliGRAM(s) IV Push every 6 hours PRN Nausea and/or Vomiting  oxyCODONE    IR 5 milliGRAM(s) Oral every 6 hours PRN Moderate Pain (4 - 6)  oxyCODONE    IR 10 milliGRAM(s) Oral every 6 hours PRN Severe Pain (7 - 10)      Vital Signs Last 24 Hrs  T(C): 36.6 (01 Mar 2024 05:00), Max: 36.7 (29 Feb 2024 18:25)  T(F): 97.9 (01 Mar 2024 05:00), Max: 98.1 (29 Feb 2024 18:25)  HR: 92 (01 Mar 2024 05:00) (71 - 92)  BP: 96/57 (01 Mar 2024 05:00) (92/52 - 114/75)  BP(mean): 70 (01 Mar 2024 05:00) (65 - 70)  RR: 18 (01 Mar 2024 05:00) (16 - 18)  SpO2: 98% (01 Mar 2024 05:00) (97% - 99%)    Parameters below as of 01 Mar 2024 05:00  Patient On (Oxygen Delivery Method): room air    PHYSICAL EXAM:    Constitutional: A&Ox3, NAD    Respiratory: non labored breathing, no respiratory distress    Cardiovascular: NSR, RRR    Abd:  soft, nontender, nondistended. No rebound, no guarding.     Buttock: Wound vac in place and seal intact.     Extremities: warm, well perfused, no calf tenderness or edema. SCDs in place     I&O's Detail    29 Feb 2024 07:01  -  01 Mar 2024 07:00  --------------------------------------------------------  IN:  Total IN: 0 mL    OUT:    VAC (Vacuum Assisted Closure) System (mL): 35 mL    Voided (mL): 100 mL  Total OUT: 135 mL    Total NET: -135 mL          LABS:                RADIOLOGY & ADDITIONAL STUDIES: Procedure: Complex excision, pilonidal sinus, Extensive excision of pilonidal cyst  Surgeon: Dr. Maria    S: Pt seen and examined at bedside. Patient is lying comfortably in bed with no complaints. Tolerating ice chips, pain well controlled with current regimen. Patient denies fever, nausea, vomiting, chest pain, and shortness of breath. Patient is not yet passing gas or having bowel movements.    O:  T(C): 36.3 (02-28-24 @ 22:45), Max: 36.3 (02-28-24 @ 22:45)  T(F): 97.4 (02-28-24 @ 22:45), Max: 97.4 (02-28-24 @ 22:45)  HR: 51 (02-28-24 @ 22:45) (48 - 70)  BP: 90/52 (02-28-24 @ 22:45) (79/51 - 105/59)  RR: 16 (02-28-24 @ 22:45) (10 - 16)  SpO2: 100% (02-28-24 @ 22:45) (95% - 100%)  Wt(kg): --        Physical Exam  General: Patient is doing well and lying in bed comfortably with NC in place  Constitutional: alert and oriented   Pulm: Nonlabored breathing, no respiratory distress  CV: Regular rate and rhythm, normal sinus rhythm  Abd:  soft, nontender, nondistended. No rebound, no guarding.   Buttock: Wound vac in place and seal intact  Extremities: warm, well perfused, no edema     yes

## 2024-03-11 NOTE — ED PROVIDER NOTE - IV ALTEPLASE DOOR HIDDEN
Verbal shift change report given to MARCO A Causey (oncoming nurse) by MARCO A Barrera (offgoing nurse). Report included the following information Nurse Handoff Report, ED SBAR, and Adult Overview.     show

## 2024-03-17 NOTE — ED PROVIDER NOTE - CROS ED CONS ALL NEG
- Previously on Metformin as outpatient   - Will continue NPH 7 units Q6H and ISS   - FS Q6H; Goal -180 negative...

## 2024-03-29 NOTE — ED PROVIDER NOTE - CPE EDP SKIN NORM
In an effort to ensure that our patients [employer]Well, a Team Member has reviewed your chart and identified an opportunity to provide the best care possible. An attempt was made to discuss or schedule overdue Preventive or Disease Management screening.     The Outcome was Contact was not made, left messageIf you have any questions or need help with scheduling, contact your primary care provider.. Care Gaps include Cervical Cancer Screening.    
normal...

## 2024-04-02 NOTE — ASSESSMENT
[FreeTextEntry1] : Reviewed outside records:\par Had CT scan Abd and Pelvis in August: No calculus or hydronephrosis. Had Renal cyst.\par \par Renal cyst:\par Discussed that Renal cysts are a common finding on routine radiological studies. Autopsy studies in patients over the age of 50 reveal greater than a 50% chance of having at least one simple renal cyst.\par And that renal cysts may be classified as simple or complex depending how they look on imaging. Discussed complexity of renal cysts and its implications as regards malignancy. \par \par Incomplete bladder emptying:\par Asked Pt to do timed voiding every 3-4 hours and lean forward when she urinates to hopefully empty better. \par \par Urinary tract infection:\par Recurrent urinary tract infections:\par Recommended: good oral hydration, timed voiding, urinate right after sex, change sanitary pads often, avoid douches, bubble baths and other feminine hygiene products. \par Recommended OTC Cranberry tablets. \par Discussed that if she keeps having UTIs options would be to either use low dose vaginal estrogen cream or low prophylactic antibiotics.\par Asked to discuss with Gynecology for possible Estrogen cream. \par Has history of lower extremity blood clot. \par Recommended repeat Urine test 1 week after completion of Antibiotics course. Will inform results. \par \par 
[FreeTextEntry1] : Reviewed outside records:\par Had CT scan Abd and Pelvis in August: No calculus or hydronephrosis. Had Renal cyst.\par \par Renal cyst:\par Discussed that Renal cysts are a common finding on routine radiological studies. Autopsy studies in patients over the age of 50 reveal greater than a 50% chance of having at least one simple renal cyst.\par And that renal cysts may be classified as simple or complex depending how they look on imaging. Discussed complexity of renal cysts and its implications as regards malignancy. \par \par Incomplete bladder emptying:\par Asked Pt to do timed voiding every 3-4 hours and lean forward when she urinates to hopefully empty better. \par \par Urinary tract infection:\par Recurrent urinary tract infections:\par Recommended: good oral hydration, timed voiding, urinate right after sex, change sanitary pads often, avoid douches, bubble baths and other feminine hygiene products. \par Recommended OTC Cranberry tablets. \par Discussed that if she keeps having UTIs options would be to either use low dose vaginal estrogen cream or low prophylactic antibiotics.\par Asked to discuss with Gynecology for possible Estrogen cream. \par Has history of lower extremity blood clot. \par Recommended repeat Urine test 1 week after completion of Antibiotics course. Will inform results. \par \par 
No

## 2024-04-10 NOTE — ED ADULT NURSE NOTE - CAS TRG GEN SKIN COLOR
"Per MyC message from the pt -  \"I just spoke with my insurance about this and they said to submit a quantity limit request through \"[email or web address]\" and to note it is due to the medical shortage of the 20mg dosage that you are prescribing 2x10mg. I don't know if this is considered a prior authorization, but they said it should only take a couple of days and if marked URGENT will take a day or two.\"     Prior Authorization Retail Medication Request    Medication/Dose: lisdexamfetamine (VYVANSE) 10 MG capsule - Take 2 capsules (20 mg) by mouth every morning     Diagnosis and ICD code (if different than what is on RX):    Attention deficit hyperactivity disorder (ADHD), other type [F90.8]    New/renewal/insurance change PA/secondary ins. PA: New    Previously Tried and Failed:    Currently taking atomoxetine 40 mg - symptoms not fully managed  Atomoxetine 25-60 mg  Currently taking Wellbutrin 300 mg - symptoms not fully managed     Rationale:  better management of ADHD symptoms     Insurance   Primary Visit Coverage    Payer Plan Sponsor Code Group Number Group Name   [place] [id number]      Primary Visit Coverage Subscriber    ID Name N Address   [id number] [id number], [place] [address]     Pharmacy Information (if different than what is on RX)  Name:  [id number] [place] [facility] - [place], [place] - [address] [place] [address]  Phone:  [phone]  Fax:[phone]      Lore Howell RN on 4/10/2024 at 2:20 PM        " Normal for race

## 2024-05-06 ENCOUNTER — TRANSCRIPTION ENCOUNTER (OUTPATIENT)
Age: 64
End: 2024-05-06

## 2024-05-07 ENCOUNTER — TRANSCRIPTION ENCOUNTER (OUTPATIENT)
Age: 64
End: 2024-05-07

## 2024-05-07 ENCOUNTER — OUTPATIENT (OUTPATIENT)
Dept: OUTPATIENT SERVICES | Facility: HOSPITAL | Age: 64
LOS: 1 days | End: 2024-05-07
Payer: MEDICAID

## 2024-05-07 VITALS
OXYGEN SATURATION: 97 % | SYSTOLIC BLOOD PRESSURE: 143 MMHG | HEIGHT: 60 IN | WEIGHT: 160.5 LBS | RESPIRATION RATE: 18 BRPM | TEMPERATURE: 98 F | DIASTOLIC BLOOD PRESSURE: 55 MMHG | HEART RATE: 76 BPM

## 2024-05-07 VITALS
DIASTOLIC BLOOD PRESSURE: 57 MMHG | SYSTOLIC BLOOD PRESSURE: 113 MMHG | RESPIRATION RATE: 12 BRPM | OXYGEN SATURATION: 97 % | HEART RATE: 92 BPM

## 2024-05-07 DIAGNOSIS — H18.511 ENDOTHELIAL CORNEAL DYSTROPHY, RIGHT EYE: ICD-10-CM

## 2024-05-07 DIAGNOSIS — M25.9 JOINT DISORDER, UNSPECIFIED: Chronic | ICD-10-CM

## 2024-05-07 DIAGNOSIS — Z98.51 TUBAL LIGATION STATUS: Chronic | ICD-10-CM

## 2024-05-07 DIAGNOSIS — Z98.49 CATARACT EXTRACTION STATUS, UNSPECIFIED EYE: Chronic | ICD-10-CM

## 2024-05-07 DIAGNOSIS — Z98.890 OTHER SPECIFIED POSTPROCEDURAL STATES: Chronic | ICD-10-CM

## 2024-05-07 DIAGNOSIS — E11.3592 TYPE 2 DIABETES MELLITUS WITH PROLIFERATIVE DIABETIC RETINOPATHY WITHOUT MACULAR EDEMA, LEFT EYE: Chronic | ICD-10-CM

## 2024-05-07 DIAGNOSIS — Z95.828 PRESENCE OF OTHER VASCULAR IMPLANTS AND GRAFTS: Chronic | ICD-10-CM

## 2024-05-07 PROCEDURE — 65756 CORNEAL TRNSPL ENDOTHELIAL: CPT | Mod: RT

## 2024-05-07 PROCEDURE — 82962 GLUCOSE BLOOD TEST: CPT

## 2024-05-07 PROCEDURE — 87070 CULTURE OTHR SPECIMN AEROBIC: CPT

## 2024-05-07 PROCEDURE — V2785: CPT

## 2024-05-07 PROCEDURE — 88304 TISSUE EXAM BY PATHOLOGIST: CPT | Mod: 26

## 2024-05-07 PROCEDURE — 88304 TISSUE EXAM BY PATHOLOGIST: CPT

## 2024-05-07 PROCEDURE — 88312 SPECIAL STAINS GROUP 1: CPT

## 2024-05-07 PROCEDURE — 65756 CORNEAL TRNSPL ENDOTHELIAL: CPT | Mod: AS,RT

## 2024-05-07 PROCEDURE — 88312 SPECIAL STAINS GROUP 1: CPT | Mod: 26

## 2024-05-07 RX ORDER — INSULIN LISPRO 100 [IU]/ML
40 INJECTION, SUSPENSION SUBCUTANEOUS
Refills: 0 | DISCHARGE

## 2024-05-07 RX ORDER — LATANOPROST 0.05 MG/ML
1 SOLUTION/ DROPS OPHTHALMIC; TOPICAL
Refills: 0 | DISCHARGE

## 2024-05-07 RX ORDER — ATORVASTATIN CALCIUM 80 MG/1
1 TABLET, FILM COATED ORAL
Qty: 0 | Refills: 0 | DISCHARGE

## 2024-05-07 RX ORDER — ASPIRIN/CALCIUM CARB/MAGNESIUM 324 MG
1 TABLET ORAL
Refills: 0 | DISCHARGE

## 2024-05-07 RX ORDER — ALENDRONATE SODIUM 70 MG/1
1 TABLET ORAL
Refills: 0 | DISCHARGE

## 2024-05-07 RX ORDER — OMEPRAZOLE 10 MG/1
1 CAPSULE, DELAYED RELEASE ORAL
Qty: 0 | Refills: 0 | DISCHARGE

## 2024-05-07 RX ORDER — INSULIN LISPRO 100 [IU]/ML
30 INJECTION, SUSPENSION SUBCUTANEOUS
Refills: 0 | DISCHARGE

## 2024-05-07 RX ORDER — PREDNISOLONE SODIUM PHOSPHATE 1 %
1 DROPS OPHTHALMIC (EYE)
Qty: 0 | Refills: 0 | DISCHARGE

## 2024-05-07 RX ORDER — LISINOPRIL 2.5 MG/1
1 TABLET ORAL
Refills: 0 | DISCHARGE

## 2024-05-07 RX ORDER — DORZOLAMIDE HYDROCHLORIDE TIMOLOL MALEATE 20; 5 MG/ML; MG/ML
1 SOLUTION/ DROPS OPHTHALMIC
Qty: 0 | Refills: 0 | DISCHARGE

## 2024-05-07 RX ORDER — METOPROLOL TARTRATE 50 MG
1 TABLET ORAL
Refills: 0 | DISCHARGE

## 2024-05-07 RX ORDER — GABAPENTIN 400 MG/1
1 CAPSULE ORAL
Refills: 0 | DISCHARGE

## 2024-05-07 NOTE — ASU PATIENT PROFILE, ADULT - NSICDXPASTSURGICALHX_GEN_ALL_CORE_FT
PAST SURGICAL HISTORY:  [place] filter in place left    H/O tubal ligation     History of cystoscopy history  nephrolithiasis and recurrent UTI S/P ESWL 5/2016    History of loop recorder     Proliferative diabetic retinopathy of left eye s/p PPV/laser/silicone  5/31/16 and 11/29/16 OS    S/P cataract surgery b/l    Shoulder disorder left

## 2024-05-07 NOTE — ASU DISCHARGE PLAN (ADULT/PEDIATRIC) - NS MD DC FALL RISK RISK
For information on Fall & Injury Prevention, visit: [email or web address] OR  [email or web address] OR  https://www.cdc.gov/steadi/patient.html

## 2024-05-07 NOTE — ASU DISCHARGE PLAN (ADULT/PEDIATRIC) - ASU DC SPECIAL INSTRUCTIONSFT
Please maintain FLAT ON BACK position as much as possible with short breaks for bathroom and meals.  Please keep eye shield on until seen in the office.   You may resume your medication regimen as usual.   Please follow up with your Doctors office for your appointment tomorrow.

## 2024-05-07 NOTE — ASU DISCHARGE PLAN (ADULT/PEDIATRIC) - CARE PROVIDER_API CALL
Zoe Lee  Ophthalmology  [address] [place] [address]  Phone: [phone]  Fax: [phone]  Follow Up Time:

## 2024-05-07 NOTE — ASU PATIENT PROFILE, ADULT - FALL HARM RISK - HARM RISK INTERVENTIONS

## 2024-05-07 NOTE — ASU PATIENT PROFILE, ADULT - LANGUAGE ASSISTANCE NEEDED
Patient also gave permission, through ,  for daughter Janna to translate as needed today/Yes-Patient/Caregiver accepts free interpretation services...

## 2024-05-16 LAB — GRAM STN FLD: SIGNIFICANT CHANGE UP

## 2024-05-28 LAB
CULTURE RESULTS: SIGNIFICANT CHANGE UP
SPECIMEN SOURCE: SIGNIFICANT CHANGE UP

## 2024-05-28 NOTE — ASU PATIENT PROFILE, ADULT - PSH
[place] filter in place  left  H/O tubal ligation    History of loop recorder    Proliferative diabetic retinopathy of left eye  s/p PPV/laser/silicone  5/31/16 and 11/29/16 OS  Shoulder disorder  left
stated

## 2024-06-25 NOTE — ED PROVIDER NOTE - PMH
Constipation    DJD (degenerative joint disease)    Dvt femoral (deep venous thrombosis)  left leg in 2014  HLD (hyperlipidemia)    Hypertension    Syncope    Type 2 diabetes mellitus    Vitamin D deficiency Imaging Studies/Medications

## 2024-07-19 ENCOUNTER — EMERGENCY (EMERGENCY)
Facility: HOSPITAL | Age: 64
LOS: 0 days | Discharge: HOME | End: 2024-07-19
Attending: STUDENT IN AN ORGANIZED HEALTH CARE EDUCATION/TRAINING PROGRAM
Payer: MEDICAID

## 2024-07-19 VITALS
HEART RATE: 94 BPM | DIASTOLIC BLOOD PRESSURE: 67 MMHG | TEMPERATURE: 98 F | SYSTOLIC BLOOD PRESSURE: 137 MMHG | OXYGEN SATURATION: 97 % | RESPIRATION RATE: 18 BRPM

## 2024-07-19 VITALS — WEIGHT: 164.91 LBS

## 2024-07-19 DIAGNOSIS — I10 ESSENTIAL (PRIMARY) HYPERTENSION: ICD-10-CM

## 2024-07-19 DIAGNOSIS — D72.829 ELEVATED WHITE BLOOD CELL COUNT, UNSPECIFIED: ICD-10-CM

## 2024-07-19 DIAGNOSIS — E11.3592 TYPE 2 DIABETES MELLITUS WITH PROLIFERATIVE DIABETIC RETINOPATHY WITHOUT MACULAR EDEMA, LEFT EYE: Chronic | ICD-10-CM

## 2024-07-19 DIAGNOSIS — B34.9 VIRAL INFECTION, UNSPECIFIED: ICD-10-CM

## 2024-07-19 DIAGNOSIS — E78.5 HYPERLIPIDEMIA, UNSPECIFIED: ICD-10-CM

## 2024-07-19 DIAGNOSIS — Z98.51 TUBAL LIGATION STATUS: Chronic | ICD-10-CM

## 2024-07-19 DIAGNOSIS — R11.0 NAUSEA: ICD-10-CM

## 2024-07-19 DIAGNOSIS — Z20.822 CONTACT WITH AND (SUSPECTED) EXPOSURE TO COVID-19: ICD-10-CM

## 2024-07-19 DIAGNOSIS — Z95.828 PRESENCE OF OTHER VASCULAR IMPLANTS AND GRAFTS: Chronic | ICD-10-CM

## 2024-07-19 DIAGNOSIS — R50.9 FEVER, UNSPECIFIED: ICD-10-CM

## 2024-07-19 DIAGNOSIS — Z98.890 OTHER SPECIFIED POSTPROCEDURAL STATES: Chronic | ICD-10-CM

## 2024-07-19 DIAGNOSIS — E11.65 TYPE 2 DIABETES MELLITUS WITH HYPERGLYCEMIA: ICD-10-CM

## 2024-07-19 DIAGNOSIS — Z88.5 ALLERGY STATUS TO NARCOTIC AGENT: ICD-10-CM

## 2024-07-19 DIAGNOSIS — M25.9 JOINT DISORDER, UNSPECIFIED: Chronic | ICD-10-CM

## 2024-07-19 DIAGNOSIS — R10.9 UNSPECIFIED ABDOMINAL PAIN: ICD-10-CM

## 2024-07-19 DIAGNOSIS — Z98.49 CATARACT EXTRACTION STATUS, UNSPECIFIED EYE: Chronic | ICD-10-CM

## 2024-07-19 LAB
ALBUMIN SERPL ELPH-MCNC: 3.6 G/DL — SIGNIFICANT CHANGE UP (ref 3.3–5)
ALP SERPL-CCNC: 155 U/L — HIGH (ref 40–120)
ALT FLD-CCNC: 23 U/L — SIGNIFICANT CHANGE UP (ref 12–78)
ANION GAP SERPL CALC-SCNC: 6 MMOL/L — SIGNIFICANT CHANGE UP (ref 5–17)
APPEARANCE UR: CLEAR — SIGNIFICANT CHANGE UP
AST SERPL-CCNC: 21 U/L — SIGNIFICANT CHANGE UP (ref 15–37)
BASOPHILS # BLD AUTO: 0.03 K/UL — SIGNIFICANT CHANGE UP (ref 0–0.2)
BASOPHILS NFR BLD AUTO: 0.2 % — SIGNIFICANT CHANGE UP (ref 0–2)
BILIRUB SERPL-MCNC: 0.5 MG/DL — SIGNIFICANT CHANGE UP (ref 0.2–1.2)
BILIRUB UR-MCNC: NEGATIVE — SIGNIFICANT CHANGE UP
BUN SERPL-MCNC: 15 MG/DL — SIGNIFICANT CHANGE UP (ref 7–23)
CALCIUM SERPL-MCNC: 9.9 MG/DL — SIGNIFICANT CHANGE UP (ref 8.5–10.1)
CHLORIDE SERPL-SCNC: 101 MMOL/L — SIGNIFICANT CHANGE UP (ref 96–108)
CO2 SERPL-SCNC: 28 MMOL/L — SIGNIFICANT CHANGE UP (ref 22–31)
COLOR SPEC: YELLOW — SIGNIFICANT CHANGE UP
CREAT SERPL-MCNC: 0.9 MG/DL — SIGNIFICANT CHANGE UP (ref 0.5–1.3)
DIFF PNL FLD: NEGATIVE — SIGNIFICANT CHANGE UP
EGFR: 72 ML/MIN/1.73M2 — SIGNIFICANT CHANGE UP
EOSINOPHIL # BLD AUTO: 0.1 K/UL — SIGNIFICANT CHANGE UP (ref 0–0.5)
EOSINOPHIL NFR BLD AUTO: 0.6 % — SIGNIFICANT CHANGE UP (ref 0–6)
FLUAV AG NPH QL: SIGNIFICANT CHANGE UP
FLUBV AG NPH QL: SIGNIFICANT CHANGE UP
GLUCOSE SERPL-MCNC: 260 MG/DL — HIGH (ref 70–99)
GLUCOSE UR QL: 500 MG/DL
HCT VFR BLD CALC: 37.7 % — SIGNIFICANT CHANGE UP (ref 34.5–45)
HGB BLD-MCNC: 12.3 G/DL — SIGNIFICANT CHANGE UP (ref 11.5–15.5)
IMM GRANULOCYTES NFR BLD AUTO: 0.5 % — SIGNIFICANT CHANGE UP (ref 0–0.9)
KETONES UR-MCNC: NEGATIVE MG/DL — SIGNIFICANT CHANGE UP
LEUKOCYTE ESTERASE UR-ACNC: NEGATIVE — SIGNIFICANT CHANGE UP
LIDOCAIN IGE QN: 22 U/L — SIGNIFICANT CHANGE UP (ref 13–75)
LYMPHOCYTES # BLD AUTO: 1.83 K/UL — SIGNIFICANT CHANGE UP (ref 1–3.3)
LYMPHOCYTES # BLD AUTO: 11.5 % — LOW (ref 13–44)
MCHC RBC-ENTMCNC: 28.3 PG — SIGNIFICANT CHANGE UP (ref 27–34)
MCHC RBC-ENTMCNC: 32.6 GM/DL — SIGNIFICANT CHANGE UP (ref 32–36)
MCV RBC AUTO: 86.7 FL — SIGNIFICANT CHANGE UP (ref 80–100)
MONOCYTES # BLD AUTO: 0.86 K/UL — SIGNIFICANT CHANGE UP (ref 0–0.9)
MONOCYTES NFR BLD AUTO: 5.4 % — SIGNIFICANT CHANGE UP (ref 2–14)
NEUTROPHILS # BLD AUTO: 13.08 K/UL — HIGH (ref 1.8–7.4)
NEUTROPHILS NFR BLD AUTO: 81.8 % — HIGH (ref 43–77)
NITRITE UR-MCNC: NEGATIVE — SIGNIFICANT CHANGE UP
PH UR: 6.5 — SIGNIFICANT CHANGE UP (ref 5–8)
PLATELET # BLD AUTO: 327 K/UL — SIGNIFICANT CHANGE UP (ref 150–400)
POTASSIUM SERPL-MCNC: 4.6 MMOL/L — SIGNIFICANT CHANGE UP (ref 3.5–5.3)
POTASSIUM SERPL-SCNC: 4.6 MMOL/L — SIGNIFICANT CHANGE UP (ref 3.5–5.3)
PROT SERPL-MCNC: 7.5 GM/DL — SIGNIFICANT CHANGE UP (ref 6–8.3)
PROT UR-MCNC: NEGATIVE MG/DL — SIGNIFICANT CHANGE UP
RBC # BLD: 4.35 M/UL — SIGNIFICANT CHANGE UP (ref 3.8–5.2)
RBC # FLD: 15 % — HIGH (ref 10.3–14.5)
RSV RNA NPH QL NAA+NON-PROBE: SIGNIFICANT CHANGE UP
SARS-COV-2 RNA SPEC QL NAA+PROBE: SIGNIFICANT CHANGE UP
SODIUM SERPL-SCNC: 135 MMOL/L — SIGNIFICANT CHANGE UP (ref 135–145)
SP GR SPEC: 1.01 — SIGNIFICANT CHANGE UP (ref 1–1.03)
UROBILINOGEN FLD QL: 0.2 MG/DL — SIGNIFICANT CHANGE UP (ref 0.2–1)
WBC # BLD: 15.98 K/UL — HIGH (ref 3.8–10.5)
WBC # FLD AUTO: 15.98 K/UL — HIGH (ref 3.8–10.5)

## 2024-07-19 PROCEDURE — 99285 EMERGENCY DEPT VISIT HI MDM: CPT | Mod: 25

## 2024-07-19 PROCEDURE — 36415 COLL VENOUS BLD VENIPUNCTURE: CPT

## 2024-07-19 PROCEDURE — 85025 COMPLETE CBC W/AUTO DIFF WBC: CPT

## 2024-07-19 PROCEDURE — 0241U: CPT

## 2024-07-19 PROCEDURE — 83690 ASSAY OF LIPASE: CPT

## 2024-07-19 PROCEDURE — 81003 URINALYSIS AUTO W/O SCOPE: CPT

## 2024-07-19 PROCEDURE — 96374 THER/PROPH/DIAG INJ IV PUSH: CPT | Mod: XU

## 2024-07-19 PROCEDURE — 96375 TX/PRO/DX INJ NEW DRUG ADDON: CPT

## 2024-07-19 PROCEDURE — 93005 ELECTROCARDIOGRAM TRACING: CPT

## 2024-07-19 PROCEDURE — 71045 X-RAY EXAM CHEST 1 VIEW: CPT | Mod: 26

## 2024-07-19 PROCEDURE — 71045 X-RAY EXAM CHEST 1 VIEW: CPT

## 2024-07-19 PROCEDURE — 93010 ELECTROCARDIOGRAM REPORT: CPT

## 2024-07-19 PROCEDURE — 74177 CT ABD & PELVIS W/CONTRAST: CPT | Mod: 26,MC

## 2024-07-19 PROCEDURE — 80053 COMPREHEN METABOLIC PANEL: CPT

## 2024-07-19 PROCEDURE — 74177 CT ABD & PELVIS W/CONTRAST: CPT | Mod: MC

## 2024-07-19 RX ORDER — ACETAMINOPHEN 325 MG
1000 TABLET ORAL ONCE
Refills: 0 | Status: COMPLETED | OUTPATIENT
Start: 2024-07-19 | End: 2024-07-19

## 2024-07-19 RX ORDER — ONDANSETRON HYDROCHLORIDE 2 MG/ML
4 INJECTION INTRAMUSCULAR; INTRAVENOUS ONCE
Refills: 0 | Status: COMPLETED | OUTPATIENT
Start: 2024-07-19 | End: 2024-07-19

## 2024-07-19 RX ORDER — SODIUM CHLORIDE 0.9 % (FLUSH) 0.9 %
1000 SYRINGE (ML) INJECTION ONCE
Refills: 0 | Status: COMPLETED | OUTPATIENT
Start: 2024-07-19 | End: 2024-07-19

## 2024-07-19 RX ORDER — FAMOTIDINE 40 MG
20 TABLET ORAL ONCE
Refills: 0 | Status: COMPLETED | OUTPATIENT
Start: 2024-07-19 | End: 2024-07-19

## 2024-07-19 RX ADMIN — Medication 400 MILLIGRAM(S): at 09:22

## 2024-07-19 RX ADMIN — ONDANSETRON HYDROCHLORIDE 4 MILLIGRAM(S): 2 INJECTION INTRAMUSCULAR; INTRAVENOUS at 09:21

## 2024-07-19 RX ADMIN — Medication 20 MILLIGRAM(S): at 09:22

## 2024-07-19 RX ADMIN — Medication 1000 MILLILITER(S): at 09:15

## 2024-10-04 ENCOUNTER — EMERGENCY (EMERGENCY)
Facility: HOSPITAL | Age: 64
LOS: 0 days | Discharge: HOME | End: 2024-10-04
Attending: EMERGENCY MEDICINE
Payer: COMMERCIAL

## 2024-10-04 VITALS
OXYGEN SATURATION: 100 % | HEART RATE: 78 BPM | TEMPERATURE: 97 F | DIASTOLIC BLOOD PRESSURE: 51 MMHG | RESPIRATION RATE: 19 BRPM | SYSTOLIC BLOOD PRESSURE: 143 MMHG

## 2024-10-04 VITALS — HEIGHT: 65 IN

## 2024-10-04 DIAGNOSIS — M19.90 UNSPECIFIED OSTEOARTHRITIS, UNSPECIFIED SITE: ICD-10-CM

## 2024-10-04 DIAGNOSIS — Z98.890 OTHER SPECIFIED POSTPROCEDURAL STATES: Chronic | ICD-10-CM

## 2024-10-04 DIAGNOSIS — Z86.718 PERSONAL HISTORY OF OTHER VENOUS THROMBOSIS AND EMBOLISM: ICD-10-CM

## 2024-10-04 DIAGNOSIS — E78.5 HYPERLIPIDEMIA, UNSPECIFIED: ICD-10-CM

## 2024-10-04 DIAGNOSIS — M54.50 LOW BACK PAIN, UNSPECIFIED: ICD-10-CM

## 2024-10-04 DIAGNOSIS — Z79.02 LONG TERM (CURRENT) USE OF ANTITHROMBOTICS/ANTIPLATELETS: ICD-10-CM

## 2024-10-04 DIAGNOSIS — E11.3592 TYPE 2 DIABETES MELLITUS WITH PROLIFERATIVE DIABETIC RETINOPATHY WITHOUT MACULAR EDEMA, LEFT EYE: Chronic | ICD-10-CM

## 2024-10-04 DIAGNOSIS — E11.9 TYPE 2 DIABETES MELLITUS WITHOUT COMPLICATIONS: ICD-10-CM

## 2024-10-04 DIAGNOSIS — M25.9 JOINT DISORDER, UNSPECIFIED: Chronic | ICD-10-CM

## 2024-10-04 DIAGNOSIS — I10 ESSENTIAL (PRIMARY) HYPERTENSION: ICD-10-CM

## 2024-10-04 DIAGNOSIS — Z98.51 TUBAL LIGATION STATUS: Chronic | ICD-10-CM

## 2024-10-04 DIAGNOSIS — Z98.49 CATARACT EXTRACTION STATUS, UNSPECIFIED EYE: Chronic | ICD-10-CM

## 2024-10-04 DIAGNOSIS — Z88.5 ALLERGY STATUS TO NARCOTIC AGENT: ICD-10-CM

## 2024-10-04 DIAGNOSIS — M48.061 SPINAL STENOSIS, LUMBAR REGION WITHOUT NEUROGENIC CLAUDICATION: ICD-10-CM

## 2024-10-04 DIAGNOSIS — Z95.828 PRESENCE OF OTHER VASCULAR IMPLANTS AND GRAFTS: Chronic | ICD-10-CM

## 2024-10-04 DIAGNOSIS — M51.26 OTHER INTERVERTEBRAL DISC DISPLACEMENT, LUMBAR REGION: ICD-10-CM

## 2024-10-04 PROCEDURE — 72131 CT LUMBAR SPINE W/O DYE: CPT | Mod: MC

## 2024-10-04 PROCEDURE — 93971 EXTREMITY STUDY: CPT | Mod: RT

## 2024-10-04 PROCEDURE — 99284 EMERGENCY DEPT VISIT MOD MDM: CPT | Mod: 25

## 2024-10-04 PROCEDURE — 93971 EXTREMITY STUDY: CPT | Mod: 26,RT

## 2024-10-04 PROCEDURE — 96372 THER/PROPH/DIAG INJ SC/IM: CPT

## 2024-10-04 PROCEDURE — 99284 EMERGENCY DEPT VISIT MOD MDM: CPT

## 2024-10-04 PROCEDURE — 72131 CT LUMBAR SPINE W/O DYE: CPT | Mod: 26,MC

## 2024-10-04 RX ORDER — KETOROLAC TROMETHAMINE 30 MG/ML
30 INJECTION, SOLUTION INTRAMUSCULAR ONCE
Refills: 0 | Status: DISCONTINUED | OUTPATIENT
Start: 2024-10-04 | End: 2024-10-04

## 2024-10-04 RX ORDER — LIDOCAINE/BENZALKONIUM/ALCOHOL
1 SOLUTION, NON-ORAL TOPICAL ONCE
Refills: 0 | Status: COMPLETED | OUTPATIENT
Start: 2024-10-04 | End: 2024-10-04

## 2024-10-04 RX ADMIN — Medication 1 PATCH: at 21:16

## 2024-10-04 RX ADMIN — KETOROLAC TROMETHAMINE 30 MILLIGRAM(S): 30 INJECTION, SOLUTION INTRAMUSCULAR at 21:19

## 2024-10-04 NOTE — ED ADULT TRIAGE NOTE - CHIEF COMPLAINT QUOTE
Pt presents to ER c/o right leg pain. Onset of symptoms began 2 months PTA spontaneously. Pt reports the pain radiates from right hip to right calf. Pt saw her PCP who prescribed muscle relaxers but they "did not sit well" with her. Pt reports over the past week pain has become exacerbated and she has more difficulty ambulating

## 2024-10-04 NOTE — ED STATDOCS - NSICDXPASTSURGICALHX_GEN_ALL_CORE_FT
PAST SURGICAL HISTORY:  [place] filter in place left    H/O tubal ligation     History of cystoscopy history  nephrolithiasis and recurrent UTI S/P ESWL 5/2016    History of loop recorder     Proliferative diabetic retinopathy of left eye s/p PPV/laser/silicone  5/31/16 and 11/29/16 OS    S/P cataract surgery b/l    Shoulder disorder left

## 2024-10-04 NOTE — ED STATDOCS - PROGRESS NOTE DETAILS
CT w/ herniated disc w/ canal stenosis, no acute cord compression on exam. sx present for months. pt to f/u w/ spine for further workup. pain improved and without neuro sx at this time. -Jose Antonio

## 2024-10-04 NOTE — ED STATDOCS - PATIENT PORTAL LINK FT
You can access the FollowMyHealth Patient Portal offered by [facility] by registering at the following website: [email or web address] By joining [facility]’s FollowMyHealth portal, you will also be able to view your health information using other applications (apps) compatible with our system.

## 2024-10-04 NOTE — ED STATDOCS - OBJECTIVE STATEMENT
64 year old female with PMHx of DM, DVT, DJD, HLD, HTN presents to ED c/o lower back pain which radiates down the right hip and leg. patient states the pain has been present x2 months, however has recently worsened and has made it difficult to stand and ambulate which prompted them to report to the ED. denies trauma to the area and states the pain onset was spontaneous. + Plavix usage. patient reports taking Tizanidine and gabapentin as prescribed by their PCP for the pain with no relief. no other medical complaints at this time. the patient has not seen a specialist for the condition.

## 2024-10-04 NOTE — ED STATDOCS - NSFOLLOWUPINSTRUCTIONS_ED_ALL_ED_FT
-Please follow-up with your primary care doctor in the next 2 days.  Please call tomorrow for an appointment.  If you cannot follow-up with your primary care doctor please return to the ED for any urgent issues.  - You were given a copy of the tests performed today.  Please bring the results with you and review them with your primary care doctor.  - If you have any worsening of symptoms or any other concerns please return to the ED immediately.  - Please continue taking your home medications as directed.    Herniated Disk    A herniated disk happens when a disk in the spine bulges out too far. There is an oval disk between each pair of bones (vertebrae) in the backbone or spine. The disks connect the bones, help the spine move, and keep the bones from rubbing against each other when you move.    A herniated disk can happen anywhere in the back or neck area. It most often affects the lower back.    What are the causes?  This condition may be caused by:  Wear and tear as you age.  Sudden injury, such as a strain or sprain.  What increases the risk?  The following factors may make you more likely to develop this condition:  Age. The older you are the higher the risk.  Being a man who is 30–50 years old.  Doing activities that involve heavy lifting, bending, or twisting.  Not getting enough exercise.  Being overweight.  Smoking or using tobacco.  What are the signs or symptoms?  Symptoms may vary depending on where the herniated disk is in your body.  Sharp pain in the arm, hip, butt, or in the lower back. The pain can spread to the leg and foot.  Dizziness.  A feeling that things are moving when they are not (vertigo).  Pain or weakness in the neck, shoulder, upper or lower arm, or fingers.  Muscle weakness. You may not be able to:  Lift your arm or leg.  Stand on your toes.  Squeeze with your hands.  Loss of feeling (numbness) or tingling in the hands, arms, feet, or legs.  Being unable to control when to poop or pee. This is rare but serious.  How is this treated?  This condition may be treated with:  Resting for a few days or several weeks.  Do not do things that need a lot of effort. Do not go into complete bed rest. Do only light activities.  If you have a herniated disk in your lower back, limit how much you sit. Sitting puts more pressure on the disk.  Medicines for pain, swelling, or tense muscles.  Ice or heat therapy.  Steroid shots. These can reduce pain and swelling.  Physical therapy to strengthen your back.  Follow these instructions at home:  Medicines    Take over-the-counter and prescription medicines only as told by your doctor.  If told, take steps to prevent problems with pooping (constipation). You may need to:  Drink enough fluid to keep your pee (urine) pale yellow.  Take over-the-counter or prescription medicines.  Eat foods that are high in fiber. These include beans, whole grains, and fresh fruits and vegetables.  Limit foods that are high in fat and processed sugars. These include fried or sweet foods.  Ask your doctor if you should avoid driving or using machines while you are taking your medicines.  Managing pain, stiffness, and swelling        If told, put ice on the affected area. To do this:  Put ice in a plastic bag.  Place a towel between your skin and the bag.  Leave the ice on for 20 minutes, 2–3 times a day.  If told, put heat on the painful area. Use the heat source that your doctor recommends, such as a moist heat pack or a heating pad.  Place a towel between your skin and the heat source.  Leave the heat on for 20–30 minutes.  Take off the heat or ice if your skin turns bright red. If you cannot feel pain, heat, or cold, you have a greater risk of getting burned.    Activity    Rest as told by your doctor. Avoid strict bed rest. Do only activities that do not cause pain.  After your rest period:  Return to your normal activities as told by your doctor. Slowly start doing exercises as told. Ask your doctor what activities and exercises are safe for you.  Use good posture.  Avoid movements that cause pain.  Do not lift anything that is heavier than 10 lb (4.5 kg), or the limit that you are told.  Do not sit or stand for a long time without moving.  Do not sit for a long time without getting up and moving around.  If exercises (physical therapy) were prescribed, do them as told by your doctor.  Try to strengthen your back and belly with exercises such as swimming or walking.  General instructions    Do not smoke or use any products that contain nicotine or tobacco. If you need help quitting, ask your doctor.  Do not wear high-heeled shoes.  Do not sleep on your belly.  If you are overweight, work with your doctor to lose weight safely.  Keep all follow-up visits.  How is this prevented?  Stay at a healthy weight.  Stay in shape. Do at least 150 minutes of moderate-intensity exercise each week, such as fast walking or water aerobics.  When lifting objects:  Keep your feet as far apart as your shoulders or farther apart.  Tighten your belly muscles.  Bend your knees and hips, and keep your spine neutral. Lift using the strength of your legs, not your back. Do not lock your knees straight out.  Always ask for help to lift heavy or large objects.  Contact a doctor if:  You have back pain or neck pain that does not get better after 6 weeks.  You have very bad pain in your back, neck, legs, or arms.  You get any of these problems in any part of your body:  Tingling.  Weakness.  Loss of feeling.  Get help right away if:  You cannot move your arms or legs.  You cannot control when you pee or poop.  You feel dizzy.  You faint.  You have trouble breathing.  These symptoms may be an emergency. Get help right away. Call your local emergency services (911 in the [place]  Do not wait to see if the symptoms will go away.  Do not drive yourself to the hospital.  Summary  A herniated disk happens when a disk in your backbone bulges out too far.  This condition may be caused by wear and tear as you age or a sudden injury.  Symptoms may vary depending on where your herniated disk is in your body.  Treatment may include rest, medicines, ice or heat therapy, steroid shots, and exercises.  This information is not intended to replace advice given to you by your health care provider. Make sure you discuss any questions you have with your health care provider.

## 2024-10-04 NOTE — ED STATDOCS - CARE PROVIDER_API CALL
Abeba Clark  Orthopaedic Surgery  [address] [facility], [address] [address]  Phone: [phone]  Fax: [phone]  Follow Up Time:

## 2024-10-04 NOTE — ED STATDOCS - PHYSICAL EXAMINATION
Physical Exam:  Gen: NAD, non-toxic appearing, able to ambulate without assistance  Head: NCAT  HEENT: EOMI, PEERLA, normal conjunctiva, tongue midline, oral mucosa moist  Lung: CTAB, no respiratory distress, no wheezes/rhonchi/rales B/L, speaking in full sentences  CV: RRR, no murmurs, rubs or gallops, distal pulses 2+ b/l  Abd: soft, nontender, no distention, no guarding, no rigidity, no rebound tenderness  MSK: no visible deformities, ROM normal in UE/LE. + right lumbar tenderness. no midline tenderness. No stepoff or deformity.   Skin: Warm, well perfused, no rash  Psych: normal affect, calm

## 2024-10-04 NOTE — ED STATDOCS - CLINICAL SUMMARY MEDICAL DECISION MAKING FREE TEXT BOX
No focal deficits on exam.  CT of the lumbar spine demonstrates a degenerative changes, central disc extrusion L4-L5 with inferior migration contributing to mild to moderate canal stenosis.  No signs or symptoms of cauda equina.  Patient ambulatory in department.  DVT study was negative.  Patient was given IM Toradol and a lidocaine patch, with significant improvement in symptoms.  Okay for discharge at this time, recommend close outpatient follow-up with spine specialist, strict turn precautions given for any worsening.  Patient verbalized understanding and agreed to plan.

## 2024-10-04 NOTE — ED STATDOCS - ATTENDING CONTRIBUTION TO CARE
I personally made and approved patient's management plan, and take responsibility for patient's management. Julius Thacker D.O.

## 2024-11-18 NOTE — ASU PREOP CHECKLIST - NS PREOP CHK MONITOR ANESTHESIA CONSENT
done [FreeTextEntry1] : CPE  [de-identified] : 48 left sided breast ca (s/p chemo, lumpectomy, axillary LN dissection 2021), thalessemia (alpha), here for CPE overall is feeling well denies any other associated symptoms  denies any other complaints or concerns at this time

## 2024-12-22 NOTE — H&P ADULT - PSH
Problem: At Risk for Falls  Goal: Patient does not fall  Outcome: Monitoring/Evaluating progress  Goal: Patient takes action to control fall-related risks  Outcome: Monitoring/Evaluating progress      [place] filter in place  left  H/O tubal ligation    History of loop recorder    Proliferative diabetic retinopathy of left eye  s/p PPV/laser/silicone  5/31/16 and 11/29/16 OS  Shoulder disorder  left

## 2025-01-25 NOTE — ED ADULT TRIAGE NOTE - DIRECT TO ROOM CARE INITIATED:
[facility]      Chief Complaint   Patient presents with    Hyperglycemia        PCP: Pat Granger MD      History of Present Illness: Patient is a 74 year old male with PMH sig for HTN, BPH, DULCE MARIA who presents for abnormal labs.  Patient is found to have a blood glucose >400 on his outpatient labs.  His PCP subsequently instructed him to go to the ED for further evaluation.  Patient states he has been experiencing polyuria and polydipsia for a long time.    Upon arrival workup significant for glucose 482, anion gap 11, creatinine 2.09, BUN 43, AST 80, ALT 63.  UA positive for infection.  Patient given NovoLog in the ED and started on IV antibiotics and fluids.    Past Medical History:    BPH (benign prostatic hyperplasia)    Elevated prostate specific antigen (PSA)    HYPERTENSION    Morbid obesity with BMI of 40.0-44.9, adult (HCC)    Obesity (BMI 30-39.9)    Sleep apnea    UTI (urinary tract infection)    Visual impairment    cataracts      Past Surgical History:   Procedure Laterality Date    Cataract      Colonoscopy N/A 8/4/2017    Procedure: COLONOSCOPY, POSSIBLE BIOPSY, POSSIBLE POLYPECTOMY 00534;  Surgeon: Michi Mercado MD;  [facility]    Other surgical history  4/18/08    prostate bx-Dr. Saini    Other surgical history  1986    varicocelectomy    Other surgical history  3/5/15    Cysto-Dr. Saini     Other surgical history  3/18/15    Cysto, TURP w/ Bipolar- Dr. Saini    Other surgical history  3/18/2016    Flow  - Dr. Saini     Other surgical history  12/30/16    [facility] Dr Saini    Remv cataract extracap,insert lens  10/26/2011    [facility]        ALL:  Allergies[1]     No current outpatient medications on file.       Social History     Tobacco Use    Smoking status: Never    Smokeless tobacco: Never   Substance Use Topics    Alcohol use: Not Currently     Alcohol/week: 0.0 - 1.0 standard drinks of  alcohol     Comment: occ        Fam Hx  Family History   Problem Relation Age of Onset    Heart Disorder Father     Cancer Mother         lung       Review of Systems  Comprehensive ROS reviewed and negative except for what is stated in HPI.      OBJECTIVE:  /70 (BP Location: Left arm)   Pulse 66   Temp 98.5 °F (36.9 °C) (Oral)   Resp 18   Ht 5' 9.02\" (1.753 m)   Wt 250 lb (113.4 kg)   SpO2 93%   BMI 36.90 kg/m²   Physical Exam:  General: Alert, awake, cooperative.  HEENT:  Normocephalic, atraumatic.  Neck:  Trachea midline.  Neck supple.  Chest:  Clear to auscultation bilaterally. No wheezes, rales, or rhonchi.  CV:  Regular rate and rhythm.  Positive S1/S2. No murmur, no gallops, no rubs  GI: Bowel sounds present in all four quadrants, abdomen is soft, non-tender, non-distended.  Extremities:  No lower extremity edema or cyanosis.  Neurological:  AAOx4.  Moving all extremities.  Skin:  Warm and dry.        Data Review:    LABS:   Lab Results   Component Value Date    WBC 4.7 01/25/2025    HGB 13.3 01/25/2025    HCT 39.0 01/25/2025    .0 01/25/2025    CREATSERUM 1.68 01/25/2025    BUN 35 01/25/2025     01/25/2025    K 4.6 01/25/2025     01/25/2025    CO2 22.0 01/25/2025     01/25/2025    CA 8.9 01/25/2025    ALB 3.9 01/24/2025    ALKPHO 65 01/24/2025    BILT 0.5 01/24/2025    TP 8.3 01/24/2025    AST 80 01/24/2025    ALT 63 01/24/2025    MG 2.0 01/25/2025    PGLU 227 01/25/2025         Radiology: No results found.     Assessment/Plan:   74 year old male with PMH sig for HTN, BPH, DULCE MARIA who presents for abnormal labs.     #Acute cystitis  #Generalized weakness  -Continue Rocephin.  Follow-up urine culture.  -Encourage ambulation    #T2DM w/ hyperglycemia  HbA1C 12.5  -Start weight-based MDI regimen with degludec 20 units nightly, NovoLog 6 units with meals, SSI  -Endocrinology consulted.  Patient will need further education regarding his diabetes and insulin.    #JOSSELYN  -Likely due  to volume depletion from polyuria.  Continue IV fluids.  -Trend renal function    #HTN  -Continue Coreg and clonidine  -Hold lisinopril and Lasix #BPH  -Continue    #BPH  -Flomax and finasteride      DVT prophylaxis: Heparin subcu  CODE STATUS: Full code    Outpatient records or previous hospital records reviewed.   [facility] hospitalist to continue to follow patient while in house  A total of 76 minutes taken with patient and coordinating care.  Greater than 50% face to face encounter.      Vaughn Norwood DO  [facility] Hospitalist      **Certification      PHYSICIAN Certification of Need for Inpatient Hospitalization - Initial Certification    Patient will require inpatient services that will reasonably be expected to span two midnight's based on the clinical documentation in H+P.   Based on patients current state of illness, I anticipate that, after discharge, patient will require TBD.         [1] No Known Allergies     10-Aug-2021 16:47

## 2025-02-07 NOTE — ED STATDOCS - NSTIMEPROVIDERCAREINITIATE_GEN_ER
[Current] : Current [TextBox_13] :   Ms. DANYELL BAIN is a 61 year old woman with a history of nicotine dependence   Over the telephone today we reviewed and confirmed that the patient meets screening eligibility criteria:  -Age: 61 years old  Smoking status:  -Current smoker, 20 pack years   Ms. BAIN denies any personal history of lung cancer. Denies any s/s of lung cancer. No lung cancer in a 1st degree relative. Denies any history of lung disease. Denies any history of occupational exposures [N_Years] : 30 [PacksperYear] : 20 04-Oct-2024 18:29

## 2025-04-06 ENCOUNTER — EMERGENCY (EMERGENCY)
Facility: HOSPITAL | Age: 65
LOS: 0 days | Discharge: HOME | End: 2025-04-06
Attending: EMERGENCY MEDICINE
Payer: MEDICAID

## 2025-04-06 VITALS
HEART RATE: 99 BPM | DIASTOLIC BLOOD PRESSURE: 56 MMHG | SYSTOLIC BLOOD PRESSURE: 128 MMHG | RESPIRATION RATE: 17 BRPM | OXYGEN SATURATION: 94 %

## 2025-04-06 VITALS
OXYGEN SATURATION: 96 % | DIASTOLIC BLOOD PRESSURE: 74 MMHG | TEMPERATURE: 98 F | HEART RATE: 100 BPM | RESPIRATION RATE: 18 BRPM | SYSTOLIC BLOOD PRESSURE: 159 MMHG | WEIGHT: 156.97 LBS

## 2025-04-06 DIAGNOSIS — D72.829 ELEVATED WHITE BLOOD CELL COUNT, UNSPECIFIED: ICD-10-CM

## 2025-04-06 DIAGNOSIS — R05.9 COUGH, UNSPECIFIED: ICD-10-CM

## 2025-04-06 DIAGNOSIS — Z88.5 ALLERGY STATUS TO NARCOTIC AGENT: ICD-10-CM

## 2025-04-06 DIAGNOSIS — E78.5 HYPERLIPIDEMIA, UNSPECIFIED: ICD-10-CM

## 2025-04-06 DIAGNOSIS — R09.02 HYPOXEMIA: ICD-10-CM

## 2025-04-06 DIAGNOSIS — R26.81 UNSTEADINESS ON FEET: ICD-10-CM

## 2025-04-06 DIAGNOSIS — Z86.73 PERSONAL HISTORY OF TRANSIENT ISCHEMIC ATTACK (TIA), AND CEREBRAL INFARCTION WITHOUT RESIDUAL DEFICITS: ICD-10-CM

## 2025-04-06 DIAGNOSIS — Z98.890 OTHER SPECIFIED POSTPROCEDURAL STATES: Chronic | ICD-10-CM

## 2025-04-06 DIAGNOSIS — Z86.718 PERSONAL HISTORY OF OTHER VENOUS THROMBOSIS AND EMBOLISM: ICD-10-CM

## 2025-04-06 DIAGNOSIS — R74.01 ELEVATION OF LEVELS OF LIVER TRANSAMINASE LEVELS: ICD-10-CM

## 2025-04-06 DIAGNOSIS — Z98.51 TUBAL LIGATION STATUS: Chronic | ICD-10-CM

## 2025-04-06 DIAGNOSIS — E11.3592 TYPE 2 DIABETES MELLITUS WITH PROLIFERATIVE DIABETIC RETINOPATHY WITHOUT MACULAR EDEMA, LEFT EYE: Chronic | ICD-10-CM

## 2025-04-06 DIAGNOSIS — Z95.828 PRESENCE OF OTHER VASCULAR IMPLANTS AND GRAFTS: Chronic | ICD-10-CM

## 2025-04-06 DIAGNOSIS — I10 ESSENTIAL (PRIMARY) HYPERTENSION: ICD-10-CM

## 2025-04-06 DIAGNOSIS — R07.89 OTHER CHEST PAIN: ICD-10-CM

## 2025-04-06 DIAGNOSIS — E11.9 TYPE 2 DIABETES MELLITUS WITHOUT COMPLICATIONS: ICD-10-CM

## 2025-04-06 DIAGNOSIS — Z98.49 CATARACT EXTRACTION STATUS, UNSPECIFIED EYE: Chronic | ICD-10-CM

## 2025-04-06 DIAGNOSIS — R07.9 CHEST PAIN, UNSPECIFIED: ICD-10-CM

## 2025-04-06 DIAGNOSIS — M25.9 JOINT DISORDER, UNSPECIFIED: Chronic | ICD-10-CM

## 2025-04-06 DIAGNOSIS — E87.1 HYPO-OSMOLALITY AND HYPONATREMIA: ICD-10-CM

## 2025-04-06 DIAGNOSIS — R05.1 ACUTE COUGH: ICD-10-CM

## 2025-04-06 LAB
ADD ON TEST-SPECIMEN IN LAB: SIGNIFICANT CHANGE UP
ALBUMIN SERPL ELPH-MCNC: 3.2 G/DL — LOW (ref 3.3–5)
ALP SERPL-CCNC: 160 U/L — HIGH (ref 40–120)
ALT FLD-CCNC: 38 U/L — SIGNIFICANT CHANGE UP (ref 12–78)
ANION GAP SERPL CALC-SCNC: 4 MMOL/L — LOW (ref 5–17)
APPEARANCE UR: CLEAR — SIGNIFICANT CHANGE UP
APTT BLD: 24.2 SEC — LOW (ref 24.5–35.6)
AST SERPL-CCNC: 19 U/L — SIGNIFICANT CHANGE UP (ref 15–37)
BASOPHILS # BLD AUTO: 0.04 K/UL — SIGNIFICANT CHANGE UP (ref 0–0.2)
BASOPHILS NFR BLD AUTO: 0.3 % — SIGNIFICANT CHANGE UP (ref 0–2)
BILIRUB SERPL-MCNC: 0.2 MG/DL — SIGNIFICANT CHANGE UP (ref 0.2–1.2)
BILIRUB UR-MCNC: NEGATIVE — SIGNIFICANT CHANGE UP
BUN SERPL-MCNC: 8 MG/DL — SIGNIFICANT CHANGE UP (ref 7–23)
CALCIUM SERPL-MCNC: 9 MG/DL — SIGNIFICANT CHANGE UP (ref 8.5–10.1)
CHLORIDE SERPL-SCNC: 103 MMOL/L — SIGNIFICANT CHANGE UP (ref 96–108)
CO2 SERPL-SCNC: 27 MMOL/L — SIGNIFICANT CHANGE UP (ref 22–31)
COLOR SPEC: YELLOW — SIGNIFICANT CHANGE UP
CREAT SERPL-MCNC: 0.83 MG/DL — SIGNIFICANT CHANGE UP (ref 0.5–1.3)
DIFF PNL FLD: NEGATIVE — SIGNIFICANT CHANGE UP
EGFR: 79 ML/MIN/1.73M2 — SIGNIFICANT CHANGE UP
EGFR: 79 ML/MIN/1.73M2 — SIGNIFICANT CHANGE UP
EOSINOPHIL # BLD AUTO: 0.45 K/UL — SIGNIFICANT CHANGE UP (ref 0–0.5)
EOSINOPHIL NFR BLD AUTO: 3.8 % — SIGNIFICANT CHANGE UP (ref 0–6)
FLUAV AG NPH QL: SIGNIFICANT CHANGE UP
FLUBV AG NPH QL: SIGNIFICANT CHANGE UP
GLUCOSE BLDC GLUCOMTR-MCNC: 249 MG/DL — HIGH (ref 70–99)
GLUCOSE BLDC GLUCOMTR-MCNC: 258 MG/DL — HIGH (ref 70–99)
GLUCOSE SERPL-MCNC: 305 MG/DL — HIGH (ref 70–99)
GLUCOSE UR QL: 500 MG/DL
HCT VFR BLD CALC: 38.3 % — SIGNIFICANT CHANGE UP (ref 34.5–45)
HGB BLD-MCNC: 12.3 G/DL — SIGNIFICANT CHANGE UP (ref 11.5–15.5)
IMM GRANULOCYTES # BLD AUTO: 0.06 K/UL — SIGNIFICANT CHANGE UP (ref 0–0.07)
IMM GRANULOCYTES NFR BLD AUTO: 0.5 % — SIGNIFICANT CHANGE UP (ref 0–0.9)
INR BLD: 0.85 RATIO — SIGNIFICANT CHANGE UP (ref 0.85–1.16)
KETONES UR-MCNC: NEGATIVE MG/DL — SIGNIFICANT CHANGE UP
LACTATE SERPL-SCNC: 2.2 MMOL/L — HIGH (ref 0.7–2)
LACTATE SERPL-SCNC: 2.3 MMOL/L — HIGH (ref 0.7–2)
LEUKOCYTE ESTERASE UR-ACNC: NEGATIVE — SIGNIFICANT CHANGE UP
LYMPHOCYTES # BLD AUTO: 2.53 K/UL — SIGNIFICANT CHANGE UP (ref 1–3.3)
LYMPHOCYTES NFR BLD AUTO: 21.1 % — SIGNIFICANT CHANGE UP (ref 13–44)
MCHC RBC-ENTMCNC: 27.4 PG — SIGNIFICANT CHANGE UP (ref 27–34)
MCHC RBC-ENTMCNC: 32.1 G/DL — SIGNIFICANT CHANGE UP (ref 32–36)
MCV RBC AUTO: 85.3 FL — SIGNIFICANT CHANGE UP (ref 80–100)
MONOCYTES # BLD AUTO: 1.13 K/UL — HIGH (ref 0–0.9)
MONOCYTES NFR BLD AUTO: 9.4 % — SIGNIFICANT CHANGE UP (ref 2–14)
NEUTROPHILS # BLD AUTO: 7.76 K/UL — HIGH (ref 1.8–7.4)
NEUTROPHILS NFR BLD AUTO: 64.9 % — SIGNIFICANT CHANGE UP (ref 43–77)
NITRITE UR-MCNC: NEGATIVE — SIGNIFICANT CHANGE UP
NRBC # BLD AUTO: 0 K/UL — SIGNIFICANT CHANGE UP (ref 0–0)
NRBC # FLD: 0 K/UL — SIGNIFICANT CHANGE UP (ref 0–0)
NRBC BLD AUTO-RTO: 0 /100 WBCS — SIGNIFICANT CHANGE UP (ref 0–0)
NT-PROBNP SERPL-SCNC: 172 PG/ML — HIGH (ref 0–125)
PH UR: 7.5 — SIGNIFICANT CHANGE UP (ref 5–8)
PLATELET # BLD AUTO: 278 K/UL — SIGNIFICANT CHANGE UP (ref 150–400)
PMV BLD: 10.4 FL — SIGNIFICANT CHANGE UP (ref 7–13)
POTASSIUM SERPL-MCNC: 3.7 MMOL/L — SIGNIFICANT CHANGE UP (ref 3.5–5.3)
POTASSIUM SERPL-SCNC: 3.7 MMOL/L — SIGNIFICANT CHANGE UP (ref 3.5–5.3)
PROT SERPL-MCNC: 7 GM/DL — SIGNIFICANT CHANGE UP (ref 6–8.3)
PROT UR-MCNC: NEGATIVE MG/DL — SIGNIFICANT CHANGE UP
PROTHROM AB SERPL-ACNC: 9.8 SEC — LOW (ref 9.9–13.4)
RBC # BLD: 4.49 M/UL — SIGNIFICANT CHANGE UP (ref 3.8–5.2)
RBC # FLD: 16.9 % — HIGH (ref 10.3–14.5)
RSV RNA NPH QL NAA+NON-PROBE: SIGNIFICANT CHANGE UP
SARS-COV-2 RNA SPEC QL NAA+PROBE: SIGNIFICANT CHANGE UP
SODIUM SERPL-SCNC: 134 MMOL/L — LOW (ref 135–145)
SOURCE RESPIRATORY: SIGNIFICANT CHANGE UP
SP GR SPEC: 1.01 — SIGNIFICANT CHANGE UP (ref 1–1.03)
TROPONIN I, HIGH SENSITIVITY RESULT: 7.18 NG/L — SIGNIFICANT CHANGE UP
UROBILINOGEN FLD QL: 0.2 MG/DL — SIGNIFICANT CHANGE UP (ref 0.2–1)
WBC # BLD: 11.97 K/UL — HIGH (ref 3.8–10.5)
WBC # FLD AUTO: 11.97 K/UL — HIGH (ref 3.8–10.5)

## 2025-04-06 PROCEDURE — 71250 CT THORAX DX C-: CPT | Mod: MC

## 2025-04-06 PROCEDURE — 96374 THER/PROPH/DIAG INJ IV PUSH: CPT

## 2025-04-06 PROCEDURE — 71045 X-RAY EXAM CHEST 1 VIEW: CPT

## 2025-04-06 PROCEDURE — 85610 PROTHROMBIN TIME: CPT

## 2025-04-06 PROCEDURE — 93005 ELECTROCARDIOGRAM TRACING: CPT

## 2025-04-06 PROCEDURE — 83605 ASSAY OF LACTIC ACID: CPT

## 2025-04-06 PROCEDURE — 96372 THER/PROPH/DIAG INJ SC/IM: CPT | Mod: XU

## 2025-04-06 PROCEDURE — 0241U: CPT

## 2025-04-06 PROCEDURE — 93010 ELECTROCARDIOGRAM REPORT: CPT

## 2025-04-06 PROCEDURE — 87040 BLOOD CULTURE FOR BACTERIA: CPT

## 2025-04-06 PROCEDURE — 96375 TX/PRO/DX INJ NEW DRUG ADDON: CPT

## 2025-04-06 PROCEDURE — 81003 URINALYSIS AUTO W/O SCOPE: CPT

## 2025-04-06 PROCEDURE — 94640 AIRWAY INHALATION TREATMENT: CPT

## 2025-04-06 PROCEDURE — 71250 CT THORAX DX C-: CPT | Mod: 26

## 2025-04-06 PROCEDURE — 82962 GLUCOSE BLOOD TEST: CPT

## 2025-04-06 PROCEDURE — 85025 COMPLETE CBC W/AUTO DIFF WBC: CPT

## 2025-04-06 PROCEDURE — 71045 X-RAY EXAM CHEST 1 VIEW: CPT | Mod: 26

## 2025-04-06 PROCEDURE — 36415 COLL VENOUS BLD VENIPUNCTURE: CPT

## 2025-04-06 PROCEDURE — 84484 ASSAY OF TROPONIN QUANT: CPT

## 2025-04-06 PROCEDURE — 80053 COMPREHEN METABOLIC PANEL: CPT

## 2025-04-06 PROCEDURE — 85730 THROMBOPLASTIN TIME PARTIAL: CPT

## 2025-04-06 PROCEDURE — 83880 ASSAY OF NATRIURETIC PEPTIDE: CPT

## 2025-04-06 PROCEDURE — 99285 EMERGENCY DEPT VISIT HI MDM: CPT

## 2025-04-06 PROCEDURE — 99285 EMERGENCY DEPT VISIT HI MDM: CPT | Mod: 25

## 2025-04-06 RX ORDER — ALBUTEROL SULFATE 2.5 MG/3ML
2 VIAL, NEBULIZER (ML) INHALATION ONCE
Refills: 0 | Status: COMPLETED | OUTPATIENT
Start: 2025-04-06 | End: 2025-04-06

## 2025-04-06 RX ORDER — BENZONATATE 100 MG
100 CAPSULE ORAL ONCE
Refills: 0 | Status: COMPLETED | OUTPATIENT
Start: 2025-04-06 | End: 2025-04-06

## 2025-04-06 RX ORDER — INSULIN LISPRO 100 U/ML
3 INJECTION, SOLUTION INTRAVENOUS; SUBCUTANEOUS ONCE
Refills: 0 | Status: COMPLETED | OUTPATIENT
Start: 2025-04-06 | End: 2025-04-06

## 2025-04-06 RX ORDER — BENZONATATE 100 MG
200 CAPSULE ORAL ONCE
Refills: 0 | Status: DISCONTINUED | OUTPATIENT
Start: 2025-04-06 | End: 2025-04-06

## 2025-04-06 RX ORDER — DEXTROMETHORPHAN HBR, GUAIFENESIN 200 MG/10ML
600 LIQUID ORAL ONCE
Refills: 0 | Status: COMPLETED | OUTPATIENT
Start: 2025-04-06 | End: 2025-04-06

## 2025-04-06 RX ORDER — CEFTRIAXONE 500 MG/1
1000 INJECTION, POWDER, FOR SOLUTION INTRAMUSCULAR; INTRAVENOUS ONCE
Refills: 0 | Status: COMPLETED | OUTPATIENT
Start: 2025-04-06 | End: 2025-04-06

## 2025-04-06 RX ORDER — AZITHROMYCIN 250 MG
500 CAPSULE ORAL ONCE
Refills: 0 | Status: COMPLETED | OUTPATIENT
Start: 2025-04-06 | End: 2025-04-06

## 2025-04-06 RX ORDER — BENZONATATE 100 MG
1 CAPSULE ORAL
Qty: 21 | Refills: 0
Start: 2025-04-06 | End: 2025-04-12

## 2025-04-06 RX ORDER — METHYLPREDNISOLONE ACETATE 80 MG/ML
125 INJECTION, SUSPENSION INTRA-ARTICULAR; INTRALESIONAL; INTRAMUSCULAR; SOFT TISSUE ONCE
Refills: 0 | Status: COMPLETED | OUTPATIENT
Start: 2025-04-06 | End: 2025-04-06

## 2025-04-06 RX ORDER — IPRATROPIUM BROMIDE AND ALBUTEROL SULFATE .5; 2.5 MG/3ML; MG/3ML
3 SOLUTION RESPIRATORY (INHALATION) ONCE
Refills: 0 | Status: COMPLETED | OUTPATIENT
Start: 2025-04-06 | End: 2025-04-06

## 2025-04-06 RX ADMIN — IPRATROPIUM BROMIDE AND ALBUTEROL SULFATE 3 MILLILITER(S): .5; 2.5 SOLUTION RESPIRATORY (INHALATION) at 16:42

## 2025-04-06 RX ADMIN — CEFTRIAXONE 1000 MILLIGRAM(S): 500 INJECTION, POWDER, FOR SOLUTION INTRAMUSCULAR; INTRAVENOUS at 17:45

## 2025-04-06 RX ADMIN — Medication 1100 MILLILITER(S): at 18:12

## 2025-04-06 RX ADMIN — Medication 1000 MILLILITER(S): at 16:41

## 2025-04-06 RX ADMIN — DEXTROMETHORPHAN HBR, GUAIFENESIN 600 MILLIGRAM(S): 200 LIQUID ORAL at 20:34

## 2025-04-06 RX ADMIN — Medication 100 MILLIGRAM(S): at 16:42

## 2025-04-06 RX ADMIN — METHYLPREDNISOLONE ACETATE 125 MILLIGRAM(S): 80 INJECTION, SUSPENSION INTRA-ARTICULAR; INTRALESIONAL; INTRAMUSCULAR; SOFT TISSUE at 16:42

## 2025-04-06 RX ADMIN — Medication 250 MILLIGRAM(S): at 18:01

## 2025-04-06 RX ADMIN — Medication 2 PUFF(S): at 20:35

## 2025-04-06 RX ADMIN — INSULIN LISPRO 3 UNIT(S): 100 INJECTION, SOLUTION INTRAVENOUS; SUBCUTANEOUS at 20:38

## 2025-04-06 NOTE — ED STATDOCS - PROGRESS NOTE DETAILS
Gregory Verma:   ID #[id number]  Pt is a 65y female w/ a PMHx of HTN, HLD, DM, DJD, and a h/o DVT who presents to the ED for persistent coughing x8 days. Patient states "I feel like I have the flu." Endorses chest and back pain. States it is difficult for her to breath and feels lightheaded/weak when she walks. Denies n/v/d, fevers. Patient is ill-appearing and lethargic, is unsteady on her feet. Will send to [facility] for further evaluation and care.

## 2025-04-06 NOTE — ED PROVIDER NOTE - WR ORDER NAME 1
Quality 226: Preventive Care And Screening: Tobacco Use: Screening And Cessation Intervention: Patient screened for tobacco use and is an ex/non-smoker Detail Level: Detailed Quality 431: Preventive Care And Screening: Unhealthy Alcohol Use - Screening: Patient not identified as an unhealthy alcohol user when screened for unhealthy alcohol use using a systematic screening method Quality 130: Documentation Of Current Medications In The Medical Record: Current Medications Documented Quality 110: Preventive Care And Screening: Influenza Immunization: Influenza Immunization previously received during influenza season Xray Chest 1 View- PORTABLE-Urgent

## 2025-04-06 NOTE — ED PROVIDER NOTE - PATIENT PORTAL LINK FT
You can access the FollowMyHealth Patient Portal offered by [facility] by registering at the following website: [email or web address]. By joining [facility]’s FollowMyHealth portal, you will also be able to view your health information using other applications (apps) compatible with our system.

## 2025-04-06 NOTE — ED ADULT TRIAGE NOTE - CHIEF COMPLAINT QUOTE
pt presents to ED due to complaints of coughing x 1 week denies fevers pt states "I feel like I have the flu "

## 2025-04-06 NOTE — ED ADULT NURSE NOTE - OBJECTIVE STATEMENT
65y female presents to the ED c.o SOB. Pt reports cough for the past week, endorses feeling chest pain and SOB today, weakness, nausea and unsteady gait for the past week. Denies fevers, chills, vomiting or diarrhea.

## 2025-04-06 NOTE — ED PROVIDER NOTE - NSFOLLOWUPINSTRUCTIONS_ED_ALL_ED_FT
Bronquitis aguda en los adultos  Acute Bronchitis, Adult  A comparison between normal and swollen airways, or bronchi, in the lungs.  La bronquitis aguda es la inflamación repentina de las vías respiratorias principales (bronquios) que salen de la tráquea en los pulmones. La hinchazón hace que las vías respiratorias se reduzcan y produzcan más mucosidad de lo normal. Jobos puede dificultar la respiración y provocar tos o respiración ruidosa (sibilancia).    La bronquitis aguda puede durar varias semanas. La tos puede durar más tiempo. Las alergias, el asma y la exposición al humo pueden empeorar la afección.    ¿Cuáles son las causas?  Esta afección puede ser causada por microbios y por sustancias que irritan los pulmones, por ejemplo:  Virus del resfrío y de la gripe. La causa más frecuente de esta afección es el virus que provoca el resfrío común.  Bacterias. Jobos es menos frecuente.  Aspirar sustancias que irritan los pulmones, por ejemplo:  Humo de cigarrillos y otros productos de tabaco.  Polvo y polen.  Vapores de productos de limpieza domésticos, gases o combustible quemado.  Contaminación del aire interior o exterior.  ¿Qué incrementa el riesgo?  Los siguientes factores pueden hacer que sea más propenso a desarrollar esta afección:  Debilitamiento del sistema de defensa del organismo, también denominado sistema inmunitario.  Gilma afección que afecte a los pulmones y la respiración, mahsa el asma.  ¿Cuáles son los signos o síntomas?  Los síntomas frecuentes de esta afección incluyen los siguientes:  Tos. Puede acompañarse de mucosidad transparente, amarillenta o verdosa procedente de los pulmones (esputo).  Sibilancias.  Secreción o congestión nasal.  Exceso de mucosidad en los pulmones (congestión torácica).  Falta de aire.  Reyna y molestias, incluido dolor de garganta o de pecho.  ¿Cómo se diagnostica?  Esta afección, usualmente, se diagnostica en función de lo siguiente:  Los síntomas y los antecedentes médicos.  Un examen físico.  También pueden realizarle otros estudios, incluidas pruebas para descartar otras afecciones, mahsa la neumonía. Estos estudios incluyen:  Gilma prueba de función pulmonar.  Análisis de gilma muestra de mucosidad para detectar la presencia de bacterias.  Pruebas para controlar el nivel de oxígeno en la arianna.  Análisis de arianna.  Radiografía de tórax.  ¿Cómo se trata?  La mayoría de los casos de bronquitis aguda se recupera con el tiempo, sin tratamiento. El médico puede recomendarle lo siguiente:  Beber más líquidos para ayudar a diluir la mucosidad de modo que sea más fácil expectorarla.  Usar medicamentos inhalados (inhalador) para mejorar el flujo de aire que entra y sale de los pulmones.  Usar un vaporizador o un humificador. Se trata de aparatos que aportan humedad al ambiente para ayudar a respirar mejor.  Julio César un medicamento que diluye la mucosidad y zhane la congestión (expectorante).  Julio César un medicamento que previene o detiene la tos (antitusivo).  No esfrecuente julio césar un antibiótico para esta afección.    Siga estas indicaciones en antoine casa:  A do not smoke cigarettes sign.  Use los medicamentos de venta chai y los recetados solamente mahsa se lo haya indicado el médico.  Utilice un inhalador, un vaporizador o un humidificador, mahsa se lo haya indicado el médico.  [place] dos cucharaditas (10 ml) de miel a la hora de acostarse para disminuir la tos por la noche.  Liliya suficiente líquido mahsa para mantener la orina de color amarillo pálido.  No consuma ningún producto que contenga nicotina o tabaco. Estos productos incluyen cigarrillos, tabaco para mascar y aparatos de vapeo, mahsa los cigarrillos electrónicos. Si necesita ayuda para dejar de consumir estos productos, consulte al médico.  Descanse mucho.  Retome sandra actividades normales según lo indicado por el médico. Pregúntele al médico qué actividades son seguras para usted.  Concurra a todas las visitas de seguimiento. Jobos es importante.  ¿Cómo se previene?  Washing hands with soap and water.  Para disminuir el riesgo de volver a sufrir esta afección:  Lávese las shaila frecuentemente con agua y jabón nima al menos 20 segundos. Use desinfectante para shaila si no dispone de agua y jabón.  Evite el contacto con personas que tienen síntomas de resfrío.  Trate de no llevarse las shaila a la boca, la nariz o los ojos.  Evite inhalar humo o vapores químicos. La inhalación de humo o vapores químicos hará que antoine afección empeore.  Aplíquese la vacuna antigripal todos los años.  Comuníquese con un médico si:  Los síntomas no mejoran después de 2 semanas.  Tiene dificultad para expulsar la mucosidad al toser.  La tos lo mantiene despierto por la noche.  Tiene fiebre.  Solicite ayuda de inmediato si:  Tose con arianna.  Siente dolor en el pecho.  Tiene falta de aire grave.  Se desmaya o se siente mahsa si se fuera a desmayar.  Tiene un dolor de jeremy intenso.  Tiene fiebre o escalofríos que empeoran.  Estos síntomas pueden representar un problema grave que constituye gilma emergencia. No espere a teetee si los síntomas desaparecen. Solicite atención médica de inmediato. Comuníquese con el servicio de emergencias de antoine localidad (911 en los [place]). No conduzca por sandra propios medios hasta el hospital.    Resumen  La bronquitis aguda es la inflamación de las vías respiratorias principales (bronquios) que salen de la tráquea en los pulmones. La hinchazón hace que las vías respiratorias se reduzcan y produzcan más mucosidad de lo normal.  Beber más líquidos puede ayudar a diluir la mucosidad de modo que sea más fácil expectorarla.  Use los medicamentos de venta chai y los recetados solamente mahsa se lo haya indicado el médico.  No consuma ningún producto que contenga nicotina o tabaco. Estos productos incluyen cigarrillos, tabaco para mascar y aparatos de vapeo, mahsa los cigarrillos electrónicos. Si necesita ayuda para dejar de consumir estos productos, consulte al médico.  Comuníquese con un médico si los síntomas no mejoran después de 2 semanas.  Esta información no tiene mahsa fin reemplazar el consejo del médico. Asegúrese de hacerle al médico cualquier pregunta que tenga.    Document Revised: 05/09/2022 Document Reviewed: 05/09/2022  Elsevier Patient Education © 2025 [employer]  Elsevier logo  Terms and Conditions  Privacy Policy  Editorial Policy  All content on this site: Copyright © 2025 Elsevier, its licensors, and contributors. All rights are reserved, including those for text and data mining, AI training, and similar technologies. For all open access content, the Creative Commons licensing terms apply.  Cookies are used by this site. To decline or learn more, visit our Cookies page.

## 2025-04-06 NOTE — ED PROVIDER NOTE - CLINICAL SUMMARY MEDICAL DECISION MAKING FREE TEXT BOX
Vitals within normal limits, patient does have mild hypoxia 94 lowest on walking.  However oxygen ranges from 94 to 99% in the room on room air.  Patient had received Solu-Medrol, DuoNeb, with improvement in cough and wheezing.  Patient also received Tessalon, guaifenesin for cough.  Rest of labs demonstrate mild leukocytosis to 11.9, mild lactate elevation 2.3, slight improvement 2.2 after IV fluids.  Did receive albuterol, which can falsely elevate lactate.  CMP with mild hyponatremia 134, glucose 305, no signs of DKA, troponin within normal limits, BMP only slightly elevated 172.  Urine within normal limits, viral swabs negative.  Chest x-ray followed by CT chest demonstrates small airway disease with some air trapping, no airspace consolidation.  Again patient is well on repeat exam.  She is improved lung sounds, speaking full sentences, no respiratory distress, ambulating well, does not require oxygen.  Diagnosis of likely a viral bronchitis.  Discharge patient home with additional cough medication, albuterol, will give spacer in department, and will cover for atypical organisms with azithromycin.  Recommend continued rest, hydration, follow-up closely with her doctor in this next week.  Strict return precautions given for any worsening.  Patient verbalized understanding and agreed to plan this time.   used for encounter.

## 2025-04-06 NOTE — ED PROVIDER NOTE - NSICDXPASTSURGICALHX_GEN_ALL_CORE_FT
PAST SURGICAL HISTORY:  [place] filter in place left    H/O tubal ligation     History of cystoscopy history  nephrolithiasis and recurrent UTI S/P ESWL 5/2016    History of loop recorder     Proliferative diabetic retinopathy of left eye s/p PPV/laser/silicone  5/31/16 and 11/29/16 OS    S/P cataract surgery b/l    Shoulder disorder left

## 2025-04-06 NOTE — ED PROVIDER NOTE - OBJECTIVE STATEMENT
65 year old female with PMHx of TIA, HTN, HLD, DM2, DJD, and h/o DVT presenting to the ED with  at bedside c/o persistent dry cough, chest pain, back pain, difficulty breathing, weakness, nausea, feeling lightheaded and unsteady on her feet for the last 8 days. Pt denies fevers, vomiting or diarrhea.

## 2025-04-06 NOTE — ED ADULT NURSE NOTE - NSICDXPASTSURGICALHX_GEN_ALL_CORE_FT
PAST SURGICAL HISTORY:  [place] filter in place left    H/O tubal ligation     History of cystoscopy history  nephrolithiasis and recurrent UTI S/P ESWL 5/2016    History of loop recorder     Proliferative diabetic retinopathy of left eye s/p PPV/laser/silicone  5/31/16 and 11/29/16 OS    S/P cataract surgery b/l    Shoulder disorder left

## 2025-04-06 NOTE — ED PROVIDER NOTE - RESPIRATORY, MLM
Attempted to contact patient, no answer, and left message for patient to call clinic back.    
Called and spoke with patient.  Pain is present, not as bad as it has been, but this weekend she didn't have to work, so she was not on her feet. Rates her pain \"10\" when on her feet, if not walking will be \"4-5\".  At night she will wake up from the pain. She knows she needs to schedule with PT, but currently doing clinicals from 6AM to 5PM, and works at a restaurant.  She is done with clinicals on Oct. 17, so hopefully she can get back on track and do PT.    She did  the prescription for Mobic and that does help a bit more.    She said that in the past she had gotten injections which were helpful.  I told her if she wanted to get her a referral for Pain management, we could ask Dr. Maher.  She said she will wait till she is done with clinicals and reach out as needed.    Will close encounter at this time.  
Dr. Maher:  Please advised on lab results as well.  
Neg for infection  
Pt calling requesting results from labs done on 9/18/19.  Pt also requesting a call back to discuss being prescribed stronger medication for diclofenac (VOLTAREN) 50 MG EC tablet --states that prescription has not helped relieve pain.    Pt can be reached at [phone]  
Sent mobic  
slight bilateral expiratory wheeze, intermittent dry cough, no respiratory distress, speaking in full sentences

## 2025-04-06 NOTE — ED PROVIDER NOTE - PROGRESS NOTE DETAILS
signed Shanda Villeda PA-C   65F c/o cough, SOB, weakness, back pain, nausea, chest pain x 8 days. Pt unsteady on her feet walking into exam room. Plan labs, cxr, flu/covid/RSV swab, neb, steroids, re-eval. Pt agrees with plan of  care. Lab demonstrate mild leukocytosis, elevated lactate 2.3.  Influenza COVID and RSV swab was negative.  Chest x-ray was performed, cannot rule out pneumonia.  Will obtain CT chest for further evaluation.  Will add on additional IV fluid, azithromycin and ceftriaxone. Julius Thacker D.O.

## 2025-04-07 RX ORDER — AZITHROMYCIN 250 MG
1 CAPSULE ORAL
Qty: 4 | Refills: 0
Start: 2025-04-07 | End: 2025-04-10

## 2025-04-11 LAB
CULTURE RESULTS: SIGNIFICANT CHANGE UP
CULTURE RESULTS: SIGNIFICANT CHANGE UP
SPECIMEN SOURCE: SIGNIFICANT CHANGE UP
SPECIMEN SOURCE: SIGNIFICANT CHANGE UP

## 2025-04-22 NOTE — ED ADULT NURSE NOTE - PAIN: NONVERBAL INDICATORS
feels 9 at night/sleep pattern change Patient requests all Lab, Cardiology, and Radiology Results on their Discharge Instructions

## 2025-06-04 NOTE — ED ADULT NURSE NOTE - NSSISCREENINGQ2_ED_A_ED
Spoke with patient via phone regarding anticoagulation monitoring.   Last INR on 6/2 was 1.4.  Dose increased.   Today's INR is 1.8 and is below goal range.    Current warfarin total weekly dose of 35 mg verified.  Informed the INR result is below therapeutic range and instructed to maintain current dose. Discussed dose and return date of 6/5 on home meter for next INR. See Anticoagulation flowsheet.  Discussed plan with pt. Since has had bolus for past 2 days, will see if INR increases tomorrow. Plan for another hold starting on 6/10 for colonoscopy on 6/13. Do not want to over bolus at this time.     Dr. Camarena is in the office today supervising the treatment.    Call your physician immediately if you notice any of the following symptoms of a blood clot:   Sudden weakness in any limb  Numbness or tingling anywhere  Visual changes or loss of sight in either eye  Sudden onset of slurred speech or inability to speak  Dizziness or faintness  New pain, swelling, redness or heat in any extremity  New SOB or chest pain  Symptoms associated with blood clotting/low INR reviewed and verbalizes understanding.    Instructed to contact the clinic with any unusual bleeding or bruising, any changes in medications, diet, health status, lifestyle, or any other changes, questions or concerns. Verbalized understanding of all discussed.      No

## (undated) DEVICE — NDL HYPO NONSAFE 30G X 0.5" (BEIGE)

## (undated) DEVICE — VENODYNE/SCD SLEEVE CALF MEDIUM

## (undated) DEVICE — PACK OPTH CATARACT

## (undated) DEVICE — KIT CENTURION ANTERIOR

## (undated) DEVICE — CANNULA IRR ALCON ANTERIOR CHAMBER 30G

## (undated) DEVICE — SPONGE OPHTHALMIC ALCON SPEAR

## (undated) DEVICE — MARKING PEN DEVON DUAL TIP W RULER

## (undated) DEVICE — ENDOSERTER CORNEAL DELIVERY

## (undated) DEVICE — PACK OPHTHALMIC 30 BALANCE TIP CUSTOM 0.9MM

## (undated) DEVICE — BLADE SHARP POINT 2.75MM

## (undated) DEVICE — Device

## (undated) DEVICE — SUT NYLON 10-0 6" HGL5

## (undated) DEVICE — CAPSULE GUARD I/A

## (undated) DEVICE — BLADE 15 DEGREE

## (undated) DEVICE — GLV 6 PROTEXIS (WHITE)

## (undated) DEVICE — WARMING BLANKET LOWER ADULT